# Patient Record
Sex: FEMALE | Race: WHITE | NOT HISPANIC OR LATINO | Employment: OTHER | URBAN - NONMETROPOLITAN AREA
[De-identification: names, ages, dates, MRNs, and addresses within clinical notes are randomized per-mention and may not be internally consistent; named-entity substitution may affect disease eponyms.]

---

## 2017-03-22 ENCOUNTER — HOSPITAL ENCOUNTER (INPATIENT)
Facility: HOSPITAL | Age: 77
LOS: 2 days | Discharge: HOME HEALTH | End: 2017-03-24
Attending: FAMILY MEDICINE | Admitting: FAMILY MEDICINE

## 2017-03-22 ENCOUNTER — APPOINTMENT (OUTPATIENT)
Dept: GENERAL RADIOLOGY | Facility: HOSPITAL | Age: 77
End: 2017-03-22

## 2017-03-22 DIAGNOSIS — K92.2 GASTROINTESTINAL HEMORRHAGE, UNSPECIFIED GASTROINTESTINAL HEMORRHAGE TYPE: Primary | ICD-10-CM

## 2017-03-22 LAB
ABO GROUP BLD: NORMAL
ALBUMIN SERPL-MCNC: 3.6 G/DL (ref 3.4–4.8)
ALBUMIN/GLOB SERPL: 1.5 G/DL (ref 1.1–1.8)
ALP SERPL-CCNC: 53 U/L (ref 38–126)
ALT SERPL W P-5'-P-CCNC: 29 U/L (ref 9–52)
ANION GAP SERPL CALCULATED.3IONS-SCNC: 6 MMOL/L (ref 5–15)
ANION GAP SERPL CALCULATED.3IONS-SCNC: 9 MMOL/L (ref 5–15)
APTT PPP: 24 SECONDS (ref 20–40.3)
AST SERPL-CCNC: 28 U/L (ref 14–36)
BASOPHILS # BLD AUTO: 0.05 10*3/MM3 (ref 0–0.2)
BASOPHILS NFR BLD AUTO: 1.2 % (ref 0–2)
BILIRUB SERPL-MCNC: 0.3 MG/DL (ref 0.2–1.3)
BLD GP AB SCN SERPL QL: NEGATIVE
BUN BLD-MCNC: 19 MG/DL (ref 7–21)
BUN BLD-MCNC: 23 MG/DL (ref 7–21)
BUN/CREAT SERPL: 21.8 (ref 7–25)
BUN/CREAT SERPL: 25 (ref 7–25)
CALCIUM SPEC-SCNC: 8.6 MG/DL (ref 8.4–10.2)
CALCIUM SPEC-SCNC: 8.9 MG/DL (ref 8.4–10.2)
CHLORIDE SERPL-SCNC: 96 MMOL/L (ref 95–110)
CHLORIDE SERPL-SCNC: 98 MMOL/L (ref 95–110)
CO2 SERPL-SCNC: 31 MMOL/L (ref 22–31)
CO2 SERPL-SCNC: 34 MMOL/L (ref 22–31)
CREAT BLD-MCNC: 0.87 MG/DL (ref 0.5–1)
CREAT BLD-MCNC: 0.92 MG/DL (ref 0.5–1)
DEPRECATED RDW RBC AUTO: 46 FL (ref 36.4–46.3)
DEPRECATED RDW RBC AUTO: 46.1 FL (ref 36.4–46.3)
EOSINOPHIL # BLD AUTO: 0.15 10*3/MM3 (ref 0–0.7)
EOSINOPHIL # BLD MANUAL: 0.23 10*3/MM3 (ref 0–0.7)
EOSINOPHIL NFR BLD AUTO: 3.6 % (ref 0–7)
EOSINOPHIL NFR BLD MANUAL: 5 % (ref 0–7)
ERYTHROCYTE [DISTWIDTH] IN BLOOD BY AUTOMATED COUNT: 13.6 % (ref 11.5–14.5)
ERYTHROCYTE [DISTWIDTH] IN BLOOD BY AUTOMATED COUNT: 13.9 % (ref 11.5–14.5)
GFR SERPL CREATININE-BSD FRML MDRD: 59 ML/MIN/1.73 (ref 39–90)
GFR SERPL CREATININE-BSD FRML MDRD: 63 ML/MIN/1.73 (ref 60–90)
GLOBULIN UR ELPH-MCNC: 2.4 GM/DL (ref 2.3–3.5)
GLUCOSE BLD-MCNC: 92 MG/DL (ref 60–100)
GLUCOSE BLD-MCNC: 99 MG/DL (ref 60–100)
HCT VFR BLD AUTO: 19.3 % (ref 35–45)
HCT VFR BLD AUTO: 21.5 % (ref 35–45)
HCT VFR BLD AUTO: 21.5 % (ref 35–45)
HGB BLD-MCNC: 6.3 G/DL (ref 12–15.5)
HGB BLD-MCNC: 7.2 G/DL (ref 12–15.5)
HGB RETIC QN: 25.7 PG (ref 30–38)
HOLD SPECIMEN: NORMAL
HOLD SPECIMEN: NORMAL
IMM GRANULOCYTES # BLD: 0 10*3/MM3 (ref 0–0.02)
IMM GRANULOCYTES NFR BLD: 0 % (ref 0–0.5)
IMM RETICS NFR: 15.5 % (ref 3–15.9)
INR PPP: 1.01 (ref 0.8–1.2)
INR PPP: 1.06 (ref 0.8–1.2)
IRON 24H UR-MRATE: 32 MCG/DL (ref 37–170)
IRON SATN MFR SERPL: 10 % (ref 15–50)
LYMPHOCYTES # BLD AUTO: 1.35 10*3/MM3 (ref 0.6–4.2)
LYMPHOCYTES # BLD MANUAL: 1.82 10*3/MM3 (ref 0.6–4.2)
LYMPHOCYTES NFR BLD AUTO: 32.8 % (ref 10–50)
LYMPHOCYTES NFR BLD MANUAL: 18 % (ref 0–12)
LYMPHOCYTES NFR BLD MANUAL: 40 % (ref 10–50)
Lab: NORMAL
MCH RBC QN AUTO: 29.9 PG (ref 26.5–34)
MCH RBC QN AUTO: 30.5 PG (ref 26.5–34)
MCHC RBC AUTO-ENTMCNC: 32.6 G/DL (ref 31.4–36)
MCHC RBC AUTO-ENTMCNC: 33.5 G/DL (ref 31.4–36)
MCV RBC AUTO: 91.1 FL (ref 80–98)
MCV RBC AUTO: 91.5 FL (ref 80–98)
MONOCYTES # BLD AUTO: 0.82 10*3/MM3 (ref 0–0.9)
MONOCYTES # BLD AUTO: 0.94 10*3/MM3 (ref 0–0.9)
MONOCYTES NFR BLD AUTO: 22.8 % (ref 0–12)
NEUTROPHILS # BLD AUTO: 1.63 10*3/MM3 (ref 2–8.6)
NEUTROPHILS # BLD AUTO: 1.68 10*3/MM3 (ref 2–8.6)
NEUTROPHILS NFR BLD AUTO: 39.6 % (ref 37–80)
NEUTROPHILS NFR BLD MANUAL: 37 % (ref 37–80)
PLAT MORPH BLD: NORMAL
PLATELET # BLD AUTO: 171 10*3/MM3 (ref 150–450)
PLATELET # BLD AUTO: 242 10*3/MM3 (ref 150–450)
PMV BLD AUTO: 10 FL (ref 8–12)
PMV BLD AUTO: 10.8 FL (ref 8–12)
POLYCHROMASIA BLD QL SMEAR: ABNORMAL
POTASSIUM BLD-SCNC: 4.1 MMOL/L (ref 3.5–5.1)
POTASSIUM BLD-SCNC: 4.2 MMOL/L (ref 3.5–5.1)
PROT SERPL-MCNC: 6 G/DL (ref 6.3–8.6)
PROTHROMBIN TIME: 13.3 SECONDS (ref 11.1–15.3)
PROTHROMBIN TIME: 13.8 SECONDS (ref 11.1–15.3)
RBC # BLD AUTO: 2.11 10*6/MM3 (ref 3.77–5.16)
RBC # BLD AUTO: 2.36 10*6/MM3 (ref 3.77–5.16)
RETICS #: 0.07 10*6/MM3 (ref 0.03–0.12)
RETICS/RBC NFR AUTO: 3.07 % (ref 0.63–2.13)
RETICULOCYTE PRODUCTION INDEX: 0.67 % (ref 0.63–2.13)
RH BLD: POSITIVE
SODIUM BLD-SCNC: 136 MMOL/L (ref 137–145)
SODIUM BLD-SCNC: 138 MMOL/L (ref 137–145)
TIBC SERPL-MCNC: 319 MCG/DL (ref 265–497)
WBC MORPH BLD: NORMAL
WBC NRBC COR # BLD: 4.12 10*3/MM3 (ref 3.2–9.8)
WBC NRBC COR # BLD: 4.54 10*3/MM3 (ref 3.2–9.8)
WHOLE BLOOD HOLD SPECIMEN: NORMAL
WHOLE BLOOD HOLD SPECIMEN: NORMAL

## 2017-03-22 PROCEDURE — 93010 ELECTROCARDIOGRAM REPORT: CPT | Performed by: INTERNAL MEDICINE

## 2017-03-22 PROCEDURE — 83540 ASSAY OF IRON: CPT | Performed by: HOSPITALIST

## 2017-03-22 PROCEDURE — P9016 RBC LEUKOCYTES REDUCED: HCPCS

## 2017-03-22 PROCEDURE — 85025 COMPLETE CBC W/AUTO DIFF WBC: CPT | Performed by: HOSPITALIST

## 2017-03-22 PROCEDURE — 85046 RETICYTE/HGB CONCENTRATE: CPT | Performed by: HOSPITALIST

## 2017-03-22 PROCEDURE — 86900 BLOOD TYPING SEROLOGIC ABO: CPT | Performed by: FAMILY MEDICINE

## 2017-03-22 PROCEDURE — 71020 HC CHEST PA AND LATERAL: CPT

## 2017-03-22 PROCEDURE — 25010000002 FUROSEMIDE PER 20 MG: Performed by: HOSPITALIST

## 2017-03-22 PROCEDURE — 85025 COMPLETE CBC W/AUTO DIFF WBC: CPT | Performed by: FAMILY MEDICINE

## 2017-03-22 PROCEDURE — 86923 COMPATIBILITY TEST ELECTRIC: CPT

## 2017-03-22 PROCEDURE — 36430 TRANSFUSION BLD/BLD COMPNT: CPT

## 2017-03-22 PROCEDURE — 86901 BLOOD TYPING SEROLOGIC RH(D): CPT | Performed by: FAMILY MEDICINE

## 2017-03-22 PROCEDURE — 99285 EMERGENCY DEPT VISIT HI MDM: CPT

## 2017-03-22 PROCEDURE — 80053 COMPREHEN METABOLIC PANEL: CPT | Performed by: FAMILY MEDICINE

## 2017-03-22 PROCEDURE — 85007 BL SMEAR W/DIFF WBC COUNT: CPT | Performed by: FAMILY MEDICINE

## 2017-03-22 PROCEDURE — 83550 IRON BINDING TEST: CPT | Performed by: HOSPITALIST

## 2017-03-22 PROCEDURE — 86850 RBC ANTIBODY SCREEN: CPT | Performed by: FAMILY MEDICINE

## 2017-03-22 PROCEDURE — 93005 ELECTROCARDIOGRAM TRACING: CPT | Performed by: NURSE PRACTITIONER

## 2017-03-22 PROCEDURE — 85610 PROTHROMBIN TIME: CPT | Performed by: FAMILY MEDICINE

## 2017-03-22 PROCEDURE — 85610 PROTHROMBIN TIME: CPT | Performed by: HOSPITALIST

## 2017-03-22 PROCEDURE — 80048 BASIC METABOLIC PNL TOTAL CA: CPT | Performed by: HOSPITALIST

## 2017-03-22 PROCEDURE — 85730 THROMBOPLASTIN TIME PARTIAL: CPT | Performed by: HOSPITALIST

## 2017-03-22 PROCEDURE — 86900 BLOOD TYPING SEROLOGIC ABO: CPT

## 2017-03-22 RX ORDER — SODIUM CHLORIDE 0.9 % (FLUSH) 0.9 %
1-10 SYRINGE (ML) INJECTION AS NEEDED
Status: DISCONTINUED | OUTPATIENT
Start: 2017-03-22 | End: 2017-03-24 | Stop reason: HOSPADM

## 2017-03-22 RX ORDER — PANTOPRAZOLE SODIUM 40 MG/1
40 TABLET, DELAYED RELEASE ORAL DAILY
COMMUNITY
Start: 2016-10-10 | End: 2018-06-21 | Stop reason: HOSPADM

## 2017-03-22 RX ORDER — LISINOPRIL 10 MG/1
10 TABLET ORAL DAILY
COMMUNITY
Start: 2016-03-31 | End: 2019-01-03

## 2017-03-22 RX ORDER — ROPINIROLE 1 MG/1
1 TABLET, FILM COATED ORAL NIGHTLY
Status: DISCONTINUED | OUTPATIENT
Start: 2017-03-22 | End: 2017-03-24 | Stop reason: HOSPADM

## 2017-03-22 RX ORDER — PANTOPRAZOLE SODIUM 40 MG/1
40 TABLET, DELAYED RELEASE ORAL DAILY
Status: DISCONTINUED | OUTPATIENT
Start: 2017-03-22 | End: 2017-03-24 | Stop reason: HOSPADM

## 2017-03-22 RX ORDER — FUROSEMIDE 20 MG/1
20 TABLET ORAL 3 TIMES WEEKLY
Status: DISCONTINUED | OUTPATIENT
Start: 2017-03-22 | End: 2017-03-24 | Stop reason: HOSPADM

## 2017-03-22 RX ORDER — SOTALOL HYDROCHLORIDE 80 MG/1
80 TABLET ORAL 2 TIMES DAILY
Status: DISCONTINUED | OUTPATIENT
Start: 2017-03-22 | End: 2017-03-24 | Stop reason: HOSPADM

## 2017-03-22 RX ORDER — SODIUM CHLORIDE 0.9 % (FLUSH) 0.9 %
10 SYRINGE (ML) INJECTION AS NEEDED
Status: DISCONTINUED | OUTPATIENT
Start: 2017-03-22 | End: 2017-03-24 | Stop reason: HOSPADM

## 2017-03-22 RX ORDER — HYDROCODONE BITARTRATE AND ACETAMINOPHEN 10; 325 MG/1; MG/1
1 TABLET ORAL 2 TIMES DAILY
Status: DISCONTINUED | OUTPATIENT
Start: 2017-03-22 | End: 2017-03-24 | Stop reason: HOSPADM

## 2017-03-22 RX ORDER — FAMOTIDINE 40 MG/1
40 TABLET, FILM COATED ORAL DAILY
Status: DISCONTINUED | OUTPATIENT
Start: 2017-03-22 | End: 2017-03-24 | Stop reason: HOSPADM

## 2017-03-22 RX ORDER — ROPINIROLE 0.5 MG/1
0.5 TABLET, FILM COATED ORAL EVERY MORNING
Status: DISCONTINUED | OUTPATIENT
Start: 2017-03-23 | End: 2017-03-24 | Stop reason: HOSPADM

## 2017-03-22 RX ORDER — FAMOTIDINE 40 MG/1
40 TABLET, FILM COATED ORAL DAILY
COMMUNITY
Start: 2017-02-25 | End: 2018-06-21 | Stop reason: HOSPADM

## 2017-03-22 RX ORDER — HYDROCODONE BITARTRATE AND ACETAMINOPHEN 10; 325 MG/1; MG/1
1 TABLET ORAL 2 TIMES DAILY
COMMUNITY
Start: 2017-02-07 | End: 2017-05-02 | Stop reason: DRUGHIGH

## 2017-03-22 RX ORDER — SOTALOL HYDROCHLORIDE 80 MG/1
80 TABLET ORAL 2 TIMES DAILY
COMMUNITY
Start: 2016-11-23

## 2017-03-22 RX ORDER — GABAPENTIN 100 MG/1
100 CAPSULE ORAL 3 TIMES DAILY
Status: DISCONTINUED | OUTPATIENT
Start: 2017-03-22 | End: 2017-03-24 | Stop reason: HOSPADM

## 2017-03-22 RX ORDER — TRAZODONE HYDROCHLORIDE 50 MG/1
50 TABLET ORAL NIGHTLY
Status: DISCONTINUED | OUTPATIENT
Start: 2017-03-22 | End: 2017-03-24 | Stop reason: HOSPADM

## 2017-03-22 RX ORDER — ATORVASTATIN CALCIUM 40 MG/1
40 TABLET, FILM COATED ORAL DAILY
Status: DISCONTINUED | OUTPATIENT
Start: 2017-03-22 | End: 2017-03-24 | Stop reason: HOSPADM

## 2017-03-22 RX ORDER — GABAPENTIN 100 MG/1
100 CAPSULE ORAL 3 TIMES DAILY
COMMUNITY
Start: 2016-10-10

## 2017-03-22 RX ORDER — FUROSEMIDE 10 MG/ML
20 INJECTION INTRAMUSCULAR; INTRAVENOUS ONCE
Status: COMPLETED | OUTPATIENT
Start: 2017-03-22 | End: 2017-03-22

## 2017-03-22 RX ORDER — ROPINIROLE 0.5 MG/1
0.5 TABLET, FILM COATED ORAL
COMMUNITY
End: 2017-05-02 | Stop reason: DRUGHIGH

## 2017-03-22 RX ORDER — ROPINIROLE 0.5 MG/1
0.5 TABLET, FILM COATED ORAL EVERY MORNING
COMMUNITY
Start: 2016-07-14 | End: 2017-04-05

## 2017-03-22 RX ORDER — LISINOPRIL 10 MG/1
10 TABLET ORAL DAILY
Status: DISCONTINUED | OUTPATIENT
Start: 2017-03-22 | End: 2017-03-24 | Stop reason: HOSPADM

## 2017-03-22 RX ORDER — TRAZODONE HYDROCHLORIDE 50 MG/1
50 TABLET ORAL NIGHTLY
COMMUNITY
Start: 2016-10-10

## 2017-03-22 RX ORDER — FUROSEMIDE 20 MG/1
20 TABLET ORAL 3 TIMES WEEKLY
COMMUNITY
Start: 2016-06-23 | End: 2017-05-08 | Stop reason: SDUPTHER

## 2017-03-22 RX ORDER — ATORVASTATIN CALCIUM 40 MG/1
40 TABLET, FILM COATED ORAL
COMMUNITY
Start: 2017-02-25 | End: 2017-04-05

## 2017-03-22 RX ADMIN — FAMOTIDINE 40 MG: 40 TABLET ORAL at 22:01

## 2017-03-22 RX ADMIN — PANTOPRAZOLE SODIUM 40 MG: 40 TABLET, DELAYED RELEASE ORAL at 22:01

## 2017-03-22 RX ADMIN — ATORVASTATIN CALCIUM 40 MG: 40 TABLET, FILM COATED ORAL at 22:45

## 2017-03-22 RX ADMIN — TRAZODONE HYDROCHLORIDE 50 MG: 50 TABLET ORAL at 22:02

## 2017-03-22 RX ADMIN — FUROSEMIDE 20 MG: 10 INJECTION, SOLUTION INTRAVENOUS at 22:02

## 2017-03-22 RX ADMIN — ROPINIROLE 1 MG: 1 TABLET ORAL at 22:00

## 2017-03-22 RX ADMIN — GABAPENTIN 100 MG: 100 CAPSULE ORAL at 22:00

## 2017-03-22 NOTE — H&P
[facility] Medicine Admission      Date of Admission: 3/22/2017      Primary Care Physician: Charles Everett MD      Chief Complaint: Weakness, dizziness, anemia    HPI:  The patient is a 76-year-old female who presented with complaints of weakness and dizziness.  Patient has expressive aphasia from her recent stroke so the majority of her history was obtained from her daughter.  The patient's daughter states that she noticed weakness and fatigue worsening on approximately the Thursday prior to admission.  On the day of admission he had a post stroke follow-up with the neurologist in [place].  She was found to have a blood pressure in the 80s systolic, and was dizzy with standing.  The neurologist's office did blood work and then discharged her to go to the [facility] urgent care.  While they were on their way to the urgent care, the office called and stated that the patient's hemoglobin was 7.5 and needed to go to the emergency department.  Neither the patient nor her daughter has noticed any melena or hematochezia.  She was started on Coumadin after her stroke on February 22.  She was transitioned as a result oh on March 9.  Of note she has been feeling more short of breath, and has been using her oxygen more lately     Concurrent Medical History:  has a past medical history of A-fib; Anemia; COPD (chronic obstructive pulmonary disease); Emphysema of lung; Hypertension; and Stroke.    Past Surgical History:  has a past surgical history that includes Cholecystectomy; Hysterectomy; Appendectomy; and Brain surgery.    Family History: family history is not on file.    Social History:  reports that she has been smoking Cigarettes.  She has a 60.00 pack-year smoking history. She does not have any smokeless tobacco history on file. She reports that she does not drink alcohol or use illicit drugs.    Allergies:   Allergies   Allergen Reactions   • Penicillins         Medications: Scheduled Meds:   Continuous Infusions:   PRN Meds:.•  sodium chloride    Review of Systems:  Review of Systems   Reason unable to perform ROS: expressive aphasia.      Physical Exam:   Temp:  [97.7 °F (36.5 °C)-98.8 °F (37.1 °C)] 97.8 °F (36.6 °C)  Heart Rate:  [59-70] 62  Resp:  [15-18] 16  BP: ()/(45-65) 112/53     Physical Exam   Constitutional: She is oriented to person, place, and time. She appears well-developed and well-nourished.   HENT:   Head: Normocephalic and atraumatic.   Nose: Nose normal.   Mouth/Throat: Oropharynx is clear and moist.   Eyes: Conjunctivae, EOM and lids are normal. Pupils are equal, round, and reactive to light. No scleral icterus.   Neck: Normal range of motion. Neck supple. No JVD present. No tracheal tenderness and no spinous process tenderness present. No rigidity. No tracheal deviation, no edema and normal range of motion present.   Cardiovascular: Normal rate, regular rhythm, S1 normal, S2 normal, normal heart sounds and normal pulses.  Exam reveals no gallop and no friction rub.    No murmur heard.  Pulmonary/Chest: Effort normal and breath sounds normal. No accessory muscle usage. No respiratory distress. She has no decreased breath sounds. She has no wheezes. She has no rales. She exhibits no tenderness.   Abdominal: Soft. Bowel sounds are normal. She exhibits no distension and no mass. There is no tenderness. There is no rebound and no guarding.   Musculoskeletal: She exhibits no edema or tenderness.        Right shoulder: She exhibits normal range of motion, no tenderness and no pain.   Neurological: She is alert and oriented to person, place, and time. She has normal reflexes. She displays no atrophy and normal reflexes. No cranial nerve deficit or sensory deficit. She exhibits normal muscle tone. She displays no seizure activity. Coordination normal.   Expressive aphasia   Skin: Skin is warm. No rash noted. No pallor.   Psychiatric: She has a  normal mood and affect. Her behavior is normal. Judgment and thought content normal.         Results Reviewed:  I have personally reviewed current lab, radiology, and data and agree with results.  Lab Results (last 24 hours)     Procedure Component Value Units Date/Time    Comprehensive Metabolic Panel [[id number]]  (Abnormal) Collected:  03/22/17 1326    Specimen:  Blood Updated:  03/22/17 1354     Glucose 99 mg/dL      BUN 23 (H) mg/dL      Creatinine 0.92 mg/dL      Sodium 136 (L) mmol/L      Potassium 4.1 mmol/L      Chloride 96 mmol/L      CO2 31.0 mmol/L      Calcium 8.9 mg/dL      Total Protein 6.0 (L) g/dL      Albumin 3.60 g/dL      ALT (SGPT) 29 U/L      AST (SGOT) 28 U/L      Alkaline Phosphatase 53 U/L      Total Bilirubin 0.3 mg/dL      eGFR Non African Amer 59 mL/min/1.73      Globulin 2.4 gm/dL      A/G Ratio 1.5 g/dL      BUN/Creatinine Ratio 25.0     Anion Gap 9.0 mmol/L     Narrative:       The MDRD GFR formula is only valid for adults with stable renal function between ages 18 and 70.    CBC Auto Differential [[id number]]  (Abnormal) Collected:  03/22/17 1326    Specimen:  Blood Updated:  03/22/17 1403     WBC 4.54 10*3/mm3      RBC 2.11 (L) 10*6/mm3      Hemoglobin 6.3 (C) g/dL      Hematocrit 19.3 (L) %      MCV 91.5 fL      MCH 29.9 pg      MCHC 32.6 g/dL      RDW 13.6 %      RDW-SD 46.0 fl      MPV 10.8 fL      Platelets 242 10*3/mm3     Protime-INR [[id number]]  (Normal) Collected:  03/22/17 1326    Specimen:  Blood Updated:  03/22/17 1448     Protime 13.3 Seconds      INR 1.01    Narrative:       Therapeutic range for most indications is 2.0-3.0 INR,  or 2.5-3.5 for mechanical heart valves.    CBC & Differential [[id number]] Collected:  03/22/17 1326    Specimen:  Blood Updated:  03/22/17 1513    Narrative:       The following orders were created for panel order CBC & Differential.  Procedure                               Abnormality         Status                     ---------                                -----------         ------                     Manual Differential[[id number]]           Abnormal            Final result               CBC Auto Differential[[id number]]         Abnormal            Final result                 Please view results for these tests on the individual orders.    Manual Differential [[id number]]  (Abnormal) Collected:  03/22/17 1326    Specimen:  Blood Updated:  03/22/17 1513     Neutrophil % 37.0 %      Lymphocyte % 40.0 %      Monocyte % 18.0 (H) %      Eosinophil % 5.0 %      Neutrophils Absolute 1.68 (L) 10*3/mm3      Lymphocytes Absolute 1.82 10*3/mm3      Monocytes Absolute 0.82 10*3/mm3      Eosinophils Absolute 0.23 10*3/mm3      Polychromasia Slight/1+     WBC Morphology Normal     Platelet Morphology Normal    George Draw [[id number]] Collected:  03/22/17 1326    Specimen:  Blood Updated:  03/22/17 1801    Narrative:       The following orders were created for panel order George Draw.  Procedure                               Abnormality         Status                     ---------                               -----------         ------                     Light Blue Top[[id number]]                                    Final result               Green Top (Gel)[[id number]]                                   Final result               Lavender Top[[id number]]                                      Final result               Gold Top - SST[[id number]]                                    Final result                 Please view results for these tests on the individual orders.    Light Blue Top [[id number]] Collected:  03/22/17 1326    Specimen:  Blood Updated:  03/22/17 1801     Extra Tube hold for add-on      Auto resulted       Green Top (Gel) [[id number]] Collected:  03/22/17 1326    Specimen:  Blood Updated:  03/22/17 1801     Extra Tube Hold for add-ons.      Auto resulted.       Lavender Top [[id number]] Collected:  03/22/17 1326    Specimen:  Blood Updated:  03/22/17 1801     Extra Tube hold  for add-on      Auto resulted       Gold Top - SST [[id number]] Collected:  03/22/17 1326    Specimen:  Blood Updated:  03/22/17 1801     Extra Tube Hold for add-ons.      Auto resulted.           Imaging Results (last 24 hours)     Procedure Component Value Units Date/Time    XR Chest 2 View [[id number]] Collected:  03/22/17 1452     Updated:  03/22/17 1455    Narrative:       Patient Name:  SANNA ZHANG  Patient ID:  [id number]   Ordering:  ERICKA LUJAN  Attending:  IRENE POLK  Referring:  ERICKA LUJAN  ------------------------------------------------  Chest PA and lateral  CLINICAL INDICATION: Shortness of breath. Anemia.    COMPARISON: October 17, 2011.    FINDINGS: Cardiac silhouette is normal in size. Pulmonary  vascularity is unremarkable.     No focal infiltrate or consolidation.  No pleural effusion.  No  pneumothorax.  Subtle fibrotic changes in both apices.    Fluid or thickening of the major fissures seen on lateral view  posteriorly.  The lungs are otherwise clear.      Impression:       CONCLUSION: Subtle bilateral apical fibrotic changes. Fluid  and/or thickening of the major fissures seen on lateral view.  Otherwise unremarkable chest.    Electronically signed by:  Jerrod Palacios MD  3/22/2017 2:54 PM CDT  Workstation: [id number]            Assessment:    Hospital Problem List     GI bleed                Plan:  1.  Symptomatic Anemia:  Patient with a hemoglobin of 6.3.  Complains of weakness, fatigue, dizziness that is worse with standing, and hypotension.   Hemoglobin on March 7 was 12.9.  Patient was started on anticoagulation as a result of a stroke February 22.  Patient is currently getting the first of 2 units of packed red blood cells.  We'll recheck hemoglobin after that.  Dr. Dickson was contacted from the emergency department and has graciously agreed to see the patient in consultation.  Will follow serial hemoglobin.  2.  Recent cerebrovascular accident:  On February 22, patient  has been left with expressive aphasia.  Was felt to be secondary to an embolic event from her atrial fibrillation.  3.  Atrial fibrillation: Has been on anticoagulation, and sotalol.  4.  COPD: No evidence of exacerbation.  5.  Tobacco abuse: Patient is currently not amenable to cessation counseling.  6.  Hypertension  7.  Hyperlipidemia  8.  DVT prophylaxis: Will use SCDs and GEN hose.  Pharmacologic anticoagulation not advised to bleeding.  9.  GI prophylaxis: Protonix.      I discussed the patients findings and my recommendations with: Patient and family.    Ramírez Dunaway MD  03/22/17  6:56 PM

## 2017-03-22 NOTE — ED PROVIDER NOTES
Subjective   HPI Comments: Daughter relates she had taken her to neurology appointment this morning, and due to low hgb, was told to come in to be checked. Also has noticed she has been feeling weak past few days, dizzy upon standing, and pale. Had stroke about a month ago. She had blood transfusion several years ago.      History provided by:  Caregiver      Review of Systems   Constitutional: Positive for fatigue.   Eyes: Negative.    Respiratory: Negative.    Cardiovascular: Negative.    Gastrointestinal: Negative.    Genitourinary: Negative.    Musculoskeletal: Negative.    Skin: Positive for pallor.   Neurological: Positive for speech difficulty and weakness.   Hematological: Bruises/bleeds easily.   Psychiatric/Behavioral: Negative.        Past Medical History:   Diagnosis Date   • A-fib    • Anemia    • COPD (chronic obstructive pulmonary disease)    • Emphysema of lung    • Hypertension    • Stroke        Allergies   Allergen Reactions   • Iodides Rash   • Penicillins Rash       Past Surgical History:   Procedure Laterality Date   • APPENDECTOMY     • BRAIN SURGERY      anuyrism   • CHOLECYSTECTOMY     • HYSTERECTOMY         History reviewed. No pertinent family history.    Social History     Social History   • Marital status:      Spouse name: N/A   • Number of children: N/A   • Years of education: N/A     Social History Main Topics   • Smoking status: Current Every Day Smoker     Packs/day: 1.00     Years: 60.00     Types: Cigarettes   • Smokeless tobacco: None   • Alcohol use No   • Drug use: No   • Sexual activity: No     Other Topics Concern   • None     Social History Narrative   • None           Objective   Physical Exam   Constitutional: She is oriented to person, place, and time. She appears well-developed and well-nourished.   HENT:   Head: Normocephalic.   Eyes: EOM are normal. Pupils are equal, round, and reactive to light.   Neck: Normal range of motion. Neck supple.   Cardiovascular:  Normal rate.    Pulmonary/Chest: Effort normal and breath sounds normal.   Abdominal: Soft. Bowel sounds are normal. She exhibits no distension. There is no tenderness.   Bruising noted over suprapubic area 2 cm x 3 cm   Genitourinary: Rectal exam shows guaiac positive stool.   Genitourinary Comments: Rectal exam, mild erythema noted in nikky-area, guaiac positive   Musculoskeletal: Normal range of motion.   Neurological: She is alert and oriented to person, place, and time. No cranial nerve deficit. Coordination normal.   Speech is clear, but words not appropriate, aphasia noted.   Skin: Skin is warm and dry. There is pallor.   Bruising noted on hands and forearms.   Nursing note and vitals reviewed.      ECG 12 Lead    Date/Time: 3/22/2017 3:03 PM  Performed by: ERICKA LUJAN  Authorized by: ERICKA LUJAN   Interpreted by physician  Comparison: compared with previous ECG from 10/19/2011  Comparison to previous ECG: A-fib on last EKG, NSR today  Rhythm: sinus rhythm  BPM: 62  Clinical impression: non-specific ECG               ED Course  ED Course      XR Chest 2 View   Final Result   CONCLUSION: Subtle bilateral apical fibrotic changes. Fluid   and/or thickening of the major fissures seen on lateral view.   Otherwise unremarkable chest.      Electronically signed by:  Jerrod Palacios MD  3/22/2017 2:54 PM CDT   Workstation: [id number]          Results for orders placed or performed during the hospital encounter of 03/22/17   Comprehensive Metabolic Panel   Result Value Ref Range    Glucose 99 60 - 100 mg/dL    BUN 23 (H) 7 - 21 mg/dL    Creatinine 0.92 0.50 - 1.00 mg/dL    Sodium 136 (L) 137 - 145 mmol/L    Potassium 4.1 3.5 - 5.1 mmol/L    Chloride 96 95 - 110 mmol/L    CO2 31.0 22.0 - 31.0 mmol/L    Calcium 8.9 8.4 - 10.2 mg/dL    Total Protein 6.0 (L) 6.3 - 8.6 g/dL    Albumin 3.60 3.40 - 4.80 g/dL    ALT (SGPT) 29 9 - 52 U/L    AST (SGOT) 28 14 - 36 U/L    Alkaline Phosphatase 53 38 - 126 U/L    Total Bilirubin  0.3 0.2 - 1.3 mg/dL    eGFR Non African Amer 59 39 - 90 mL/min/1.73    Globulin 2.4 2.3 - 3.5 gm/dL    A/G Ratio 1.5 1.1 - 1.8 g/dL    BUN/Creatinine Ratio 25.0 7.0 - 25.0    Anion Gap 9.0 5.0 - 15.0 mmol/L   Protime-INR   Result Value Ref Range    Protime 13.3 11.1 - 15.3 Seconds    INR 1.01 0.80 - 1.20   CBC Auto Differential   Result Value Ref Range    WBC 4.54 3.20 - 9.80 10*3/mm3    RBC 2.11 (L) 3.77 - 5.16 10*6/mm3    Hemoglobin 6.3 (C) 12.0 - 15.5 g/dL    Hematocrit 19.3 (L) 35.0 - 45.0 %    MCV 91.5 80.0 - 98.0 fL    MCH 29.9 26.5 - 34.0 pg    MCHC 32.6 31.4 - 36.0 g/dL    RDW 13.6 11.5 - 14.5 %    RDW-SD 46.0 36.4 - 46.3 fl    MPV 10.8 8.0 - 12.0 fL    Platelets 242 150 - 450 10*3/mm3   Basic Metabolic Panel   Result Value Ref Range    Glucose 92 60 - 100 mg/dL    BUN 19 7 - 21 mg/dL    Creatinine 0.87 0.50 - 1.00 mg/dL    Sodium 138 137 - 145 mmol/L    Potassium 4.2 3.5 - 5.1 mmol/L    Chloride 98 95 - 110 mmol/L    CO2 34.0 (H) 22.0 - 31.0 mmol/L    Calcium 8.6 8.4 - 10.2 mg/dL    eGFR Non African Amer 63 >60 mL/min/1.73    BUN/Creatinine Ratio 21.8 7.0 - 25.0    Anion Gap 6.0 5.0 - 15.0 mmol/L   Reticulocytes   Result Value Ref Range    Reticulocyte % 3.07 (H) 0.63 - 2.13 %    Reticulocyte Absolute 0.0725 0.0250 - 0.1250 10*6/mm3    Immature Reticulocyte Fraction 15.5 3.0 - 15.9 %    Reticulocyte Production Index 0.67 0.63 - 2.13 %    Hematocrit 21.5 (L) 35.0 - 45.0 %    Reticulated Hgb 25.7 (L) 30.0 - 38.0 pg   Iron Profile   Result Value Ref Range    Iron 32 (L) 37 - 170 mcg/dL    TIBC 319 265 - 497 mcg/dL    Iron Saturation 10 (L) 15 - 50 %   aPTT   Result Value Ref Range    PTT 24.0 20.0 - 40.3 seconds   Protime-INR   Result Value Ref Range    Protime 13.8 11.1 - 15.3 Seconds    INR 1.06 0.80 - 1.20   CBC Auto Differential   Result Value Ref Range    WBC 4.12 3.20 - 9.80 10*3/mm3    RBC 2.36 (L) 3.77 - 5.16 10*6/mm3    Hemoglobin 7.2 (L) 12.0 - 15.5 g/dL    Hematocrit 21.5 (L) 35.0 - 45.0 %    MCV  91.1 80.0 - 98.0 fL    MCH 30.5 26.5 - 34.0 pg    MCHC 33.5 31.4 - 36.0 g/dL    RDW 13.9 11.5 - 14.5 %    RDW-SD 46.1 36.4 - 46.3 fl    MPV 10.0 8.0 - 12.0 fL    Platelets 171 150 - 450 10*3/mm3    Neutrophil % 39.6 37.0 - 80.0 %    Lymphocyte % 32.8 10.0 - 50.0 %    Monocyte % 22.8 (H) 0.0 - 12.0 %    Eosinophil % 3.6 0.0 - 7.0 %    Basophil % 1.2 0.0 - 2.0 %    Immature Grans % 0.0 0.0 - 0.5 %    Neutrophils, Absolute 1.63 (L) 2.00 - 8.60 10*3/mm3    Lymphocytes, Absolute 1.35 0.60 - 4.20 10*3/mm3    Monocytes, Absolute 0.94 (H) 0.00 - 0.90 10*3/mm3    Eosinophils, Absolute 0.15 0.00 - 0.70 10*3/mm3    Basophils, Absolute 0.05 0.00 - 0.20 10*3/mm3    Immature Grans, Absolute 0.00 0.00 - 0.02 10*3/mm3   Basic Metabolic Panel   Result Value Ref Range    Glucose 91 60 - 100 mg/dL    BUN 17 7 - 21 mg/dL    Creatinine 0.88 0.50 - 1.00 mg/dL    Sodium 140 137 - 145 mmol/L    Potassium 3.8 3.5 - 5.1 mmol/L    Chloride 98 95 - 110 mmol/L    CO2 34.0 (H) 22.0 - 31.0 mmol/L    Calcium 8.4 8.4 - 10.2 mg/dL    eGFR Non African Amer 62 >60 mL/min/1.73    BUN/Creatinine Ratio 19.3 7.0 - 25.0    Anion Gap 8.0 5.0 - 15.0 mmol/L   CBC Auto Differential   Result Value Ref Range    WBC 3.23 3.20 - 9.80 10*3/mm3    RBC 2.48 (L) 3.77 - 5.16 10*6/mm3    Hemoglobin 7.6 (L) 12.0 - 15.5 g/dL    Hematocrit 22.4 (L) 35.0 - 45.0 %    MCV 90.3 80.0 - 98.0 fL    MCH 30.6 26.5 - 34.0 pg    MCHC 33.9 31.4 - 36.0 g/dL    RDW 13.5 11.5 - 14.5 %    RDW-SD 44.5 36.4 - 46.3 fl    MPV 10.6 8.0 - 12.0 fL    Platelets 194 150 - 450 10*3/mm3    Neutrophil % 40.5 37.0 - 80.0 %    Lymphocyte % 30.3 10.0 - 50.0 %    Monocyte % 24.5 (H) 0.0 - 12.0 %    Eosinophil % 2.5 0.0 - 7.0 %    Basophil % 1.9 0.0 - 2.0 %    Immature Grans % 0.3 0.0 - 0.5 %    Neutrophils, Absolute 1.31 (L) 2.00 - 8.60 10*3/mm3    Lymphocytes, Absolute 0.98 0.60 - 4.20 10*3/mm3    Monocytes, Absolute 0.79 0.00 - 0.90 10*3/mm3    Eosinophils, Absolute 0.08 0.00 - 0.70 10*3/mm3     Basophils, Absolute 0.06 0.00 - 0.20 10*3/mm3    Immature Grans, Absolute 0.01 0.00 - 0.02 10*3/mm3   Type & Screen   Result Value Ref Range    ABO Type A     RH type Positive     Antibody Screen Negative    PREVIOUS HISTORY   Result Value Ref Range    Previous History Previous Record on File    Prepare RBC, 1 Units   Result Value Ref Range    Product Code Y2362M07     Unit Number [id number]     UNIT  ABO A     UNIT  RH POS     Dispense Status PT     Blood Type APOS     Blood Expiration Date 201704132359     Blood Type Barcode 6200    Prepare RBC, 2 Units   Result Value Ref Range    Product Code M3761Y82     Unit Number [id number]     UNIT  ABO A     UNIT  RH POS     Dispense Status IS     Blood Type APOS     Blood Expiration Date 201704122359     Blood Type Barcode 6200     Product Code S1901O54     Unit Number [id number]     UNIT  ABO A     UNIT  RH POS     Dispense Status IS     Blood Type APOS     Blood Expiration Date 201704122359     Blood Type Barcode 6200    Light Blue Top   Result Value Ref Range    Extra Tube hold for add-on    Green Top (Gel)   Result Value Ref Range    Extra Tube Hold for add-ons.    Lavender Top   Result Value Ref Range    Extra Tube hold for add-on    Gold Top - SST   Result Value Ref Range    Extra Tube Hold for add-ons.    Manual Differential   Result Value Ref Range    Neutrophil % 37.0 37.0 - 80.0 %    Lymphocyte % 40.0 10.0 - 50.0 %    Monocyte % 18.0 (H) 0.0 - 12.0 %    Eosinophil % 5.0 0.0 - 7.0 %    Neutrophils Absolute 1.68 (L) 2.00 - 8.60 10*3/mm3    Lymphocytes Absolute 1.82 0.60 - 4.20 10*3/mm3    Monocytes Absolute 0.82 0.00 - 0.90 10*3/mm3    Eosinophils Absolute 0.23 0.00 - 0.70 10*3/mm3    Polychromasia Slight/1+ None Seen    WBC Morphology Normal Normal    Platelet Morphology Normal Normal   Manual Differential   Result Value Ref Range    Neutrophil % 44.0 37.0 - 80.0 %    Lymphocyte % 27.0 10.0 - 50.0 %    Monocyte % 18.0 (H) 0.0 - 12.0 %    Eosinophil  % 3.0 0.0 - 7.0 %    Basophil % 2.0 0.0 - 2.0 %    Bands %  2.0 0.0 - 5.0 %    Atypical Lymphocyte % 4.0 0.0 - 5.0 %    Neutrophils Absolute 1.49 (L) 2.00 - 8.60 10*3/mm3    Lymphocytes Absolute 0.87 0.60 - 4.20 10*3/mm3    Monocytes Absolute 0.58 0.00 - 0.90 10*3/mm3    Eosinophils Absolute 0.10 0.00 - 0.70 10*3/mm3    Basophils Absolute 0.06 0.00 - 0.20 10*3/mm3    Anisocytosis Slight/1+ None Seen    Hypochromia Slight/1+ None Seen    Microcytes Slight/1+ None Seen    WBC Morphology Normal Normal    Platelet Estimate Adequate Normal               MDM  Number of Diagnoses or Management Options  Gastrointestinal hemorrhage, unspecified gastrointestinal hemorrhage type:   Diagnosis management comments: Daughter present with pt , Keli Hilario, has guardianship. Pt had been on coumadin for A-fib until about a month ago, when she had a CVA and she was taken off the coumadin and started on Eliquis. CVA left her with memory and aphasia issues. Full mobility all limbs. But at recheck today with neurologist in [place], labs checked and she was on the way home and she was called and told to come in as her hgb was 7. Daughter related she does bruise easily, and she is more pale than usual. States she did have colonoscopy 3 years ago per Dr Dickson. She was Guaiac +. D/W Dr Motley, then called consulted with Dr Dickson and he will see her in hospital as consult, D/W hospitalist and she will be admitted for GI bleed. Hbg 6.3.  BP initially 88/45, then 102/53. One unit PRBC transfused while in ED.      Final diagnoses:   Gastrointestinal hemorrhage, unspecified gastrointestinal hemorrhage type            Tita Alex, APRN  03/23/17 1678

## 2017-03-23 PROBLEM — I10 HYPERTENSION: Chronic | Status: ACTIVE | Noted: 2017-03-23

## 2017-03-23 PROBLEM — D64.9 ANEMIA: Chronic | Status: ACTIVE | Noted: 2017-03-23

## 2017-03-23 PROBLEM — Z86.73 HISTORY OF STROKE: Chronic | Status: ACTIVE | Noted: 2017-03-23

## 2017-03-23 PROBLEM — R47.01 EXPRESSIVE APHASIA: Chronic | Status: ACTIVE | Noted: 2017-03-23

## 2017-03-23 PROBLEM — J44.9 COPD (CHRONIC OBSTRUCTIVE PULMONARY DISEASE) (HCC): Chronic | Status: ACTIVE | Noted: 2017-03-23

## 2017-03-23 PROBLEM — I48.91 A-FIB (HCC): Chronic | Status: ACTIVE | Noted: 2017-03-23

## 2017-03-23 LAB
ABO + RH BLD: NORMAL
ANION GAP SERPL CALCULATED.3IONS-SCNC: 8 MMOL/L (ref 5–15)
ANISOCYTOSIS BLD QL: ABNORMAL
BASOPHILS # BLD AUTO: 0.06 10*3/MM3 (ref 0–0.2)
BASOPHILS # BLD MANUAL: 0.06 10*3/MM3 (ref 0–0.2)
BASOPHILS NFR BLD AUTO: 1.9 % (ref 0–2)
BASOPHILS NFR BLD AUTO: 2 % (ref 0–2)
BH BB BLOOD EXPIRATION DATE: NORMAL
BH BB BLOOD TYPE BARCODE: 6200
BH BB DISPENSE STATUS: NORMAL
BH BB PRODUCT CODE: NORMAL
BH BB UNIT NUMBER: NORMAL
BUN BLD-MCNC: 17 MG/DL (ref 7–21)
BUN/CREAT SERPL: 19.3 (ref 7–25)
CALCIUM SPEC-SCNC: 8.4 MG/DL (ref 8.4–10.2)
CHLORIDE SERPL-SCNC: 98 MMOL/L (ref 95–110)
CO2 SERPL-SCNC: 34 MMOL/L (ref 22–31)
CREAT BLD-MCNC: 0.88 MG/DL (ref 0.5–1)
DEPRECATED RDW RBC AUTO: 44.5 FL (ref 36.4–46.3)
EOSINOPHIL # BLD AUTO: 0.08 10*3/MM3 (ref 0–0.7)
EOSINOPHIL # BLD MANUAL: 0.1 10*3/MM3 (ref 0–0.7)
EOSINOPHIL NFR BLD AUTO: 2.5 % (ref 0–7)
EOSINOPHIL NFR BLD MANUAL: 3 % (ref 0–7)
ERYTHROCYTE [DISTWIDTH] IN BLOOD BY AUTOMATED COUNT: 13.5 % (ref 11.5–14.5)
GFR SERPL CREATININE-BSD FRML MDRD: 62 ML/MIN/1.73 (ref 60–90)
GLUCOSE BLD-MCNC: 91 MG/DL (ref 60–100)
HCT VFR BLD AUTO: 22.4 % (ref 35–45)
HCT VFR BLD AUTO: 30.4 % (ref 35–45)
HGB BLD-MCNC: 10.4 G/DL (ref 12–15.5)
HGB BLD-MCNC: 7.6 G/DL (ref 12–15.5)
HYPOCHROMIA BLD QL: ABNORMAL
IMM GRANULOCYTES # BLD: 0.01 10*3/MM3 (ref 0–0.02)
IMM GRANULOCYTES NFR BLD: 0.3 % (ref 0–0.5)
LYMPHOCYTES # BLD AUTO: 0.98 10*3/MM3 (ref 0.6–4.2)
LYMPHOCYTES # BLD MANUAL: 0.87 10*3/MM3 (ref 0.6–4.2)
LYMPHOCYTES NFR BLD AUTO: 30.3 % (ref 10–50)
LYMPHOCYTES NFR BLD MANUAL: 18 % (ref 0–12)
LYMPHOCYTES NFR BLD MANUAL: 27 % (ref 10–50)
MCH RBC QN AUTO: 30.6 PG (ref 26.5–34)
MCHC RBC AUTO-ENTMCNC: 33.9 G/DL (ref 31.4–36)
MCV RBC AUTO: 90.3 FL (ref 80–98)
MICROCYTES BLD QL: ABNORMAL
MONOCYTES # BLD AUTO: 0.58 10*3/MM3 (ref 0–0.9)
MONOCYTES # BLD AUTO: 0.79 10*3/MM3 (ref 0–0.9)
MONOCYTES NFR BLD AUTO: 24.5 % (ref 0–12)
NEUTROPHILS # BLD AUTO: 1.31 10*3/MM3 (ref 2–8.6)
NEUTROPHILS # BLD AUTO: 1.49 10*3/MM3 (ref 2–8.6)
NEUTROPHILS NFR BLD AUTO: 40.5 % (ref 37–80)
NEUTROPHILS NFR BLD MANUAL: 44 % (ref 37–80)
NEUTS BAND NFR BLD MANUAL: 2 % (ref 0–5)
PLATELET # BLD AUTO: 194 10*3/MM3 (ref 150–450)
PMV BLD AUTO: 10.6 FL (ref 8–12)
POTASSIUM BLD-SCNC: 3.8 MMOL/L (ref 3.5–5.1)
RBC # BLD AUTO: 2.48 10*6/MM3 (ref 3.77–5.16)
SMALL PLATELETS BLD QL SMEAR: ADEQUATE
SODIUM BLD-SCNC: 140 MMOL/L (ref 137–145)
UNIT  ABO: NORMAL
UNIT  RH: NORMAL
VARIANT LYMPHS NFR BLD MANUAL: 4 % (ref 0–5)
WBC MORPH BLD: NORMAL
WBC NRBC COR # BLD: 3.23 10*3/MM3 (ref 3.2–9.8)

## 2017-03-23 PROCEDURE — 85025 COMPLETE CBC W/AUTO DIFF WBC: CPT | Performed by: HOSPITALIST

## 2017-03-23 PROCEDURE — 94799 UNLISTED PULMONARY SVC/PX: CPT

## 2017-03-23 PROCEDURE — 94760 N-INVAS EAR/PLS OXIMETRY 1: CPT

## 2017-03-23 PROCEDURE — 85007 BL SMEAR W/DIFF WBC COUNT: CPT | Performed by: HOSPITALIST

## 2017-03-23 PROCEDURE — 85018 HEMOGLOBIN: CPT | Performed by: FAMILY MEDICINE

## 2017-03-23 PROCEDURE — 80048 BASIC METABOLIC PNL TOTAL CA: CPT | Performed by: HOSPITALIST

## 2017-03-23 PROCEDURE — 36430 TRANSFUSION BLD/BLD COMPNT: CPT

## 2017-03-23 PROCEDURE — 85014 HEMATOCRIT: CPT | Performed by: FAMILY MEDICINE

## 2017-03-23 PROCEDURE — P9021 RED BLOOD CELLS UNIT: HCPCS

## 2017-03-23 PROCEDURE — 86900 BLOOD TYPING SEROLOGIC ABO: CPT

## 2017-03-23 RX ORDER — ASPIRIN 81 MG/1
81 TABLET, CHEWABLE ORAL DAILY
COMMUNITY
Start: 2017-02-25 | End: 2018-06-21 | Stop reason: HOSPADM

## 2017-03-23 RX ORDER — FUROSEMIDE 10 MG/ML
20 INJECTION INTRAMUSCULAR; INTRAVENOUS AS NEEDED
Status: ACTIVE | OUTPATIENT
Start: 2017-03-23 | End: 2017-03-24

## 2017-03-23 RX ADMIN — GABAPENTIN 100 MG: 100 CAPSULE ORAL at 22:20

## 2017-03-23 RX ADMIN — SOTALOL HYDROCHLORIDE 80 MG: 80 TABLET ORAL at 08:19

## 2017-03-23 RX ADMIN — GABAPENTIN 100 MG: 100 CAPSULE ORAL at 08:17

## 2017-03-23 RX ADMIN — HYDROCODONE BITARTRATE AND ACETAMINOPHEN 1 TABLET: 10; 325 TABLET ORAL at 17:22

## 2017-03-23 RX ADMIN — HYDROCODONE BITARTRATE AND ACETAMINOPHEN 1 TABLET: 10; 325 TABLET ORAL at 08:13

## 2017-03-23 RX ADMIN — ROPINIROLE 1 MG: 1 TABLET ORAL at 22:20

## 2017-03-23 RX ADMIN — GABAPENTIN 100 MG: 100 CAPSULE ORAL at 17:22

## 2017-03-23 RX ADMIN — PANTOPRAZOLE SODIUM 40 MG: 40 TABLET, DELAYED RELEASE ORAL at 08:17

## 2017-03-23 RX ADMIN — LISINOPRIL 10 MG: 10 TABLET ORAL at 08:19

## 2017-03-23 RX ADMIN — ROPINIROLE HYDROCHLORIDE 0.5 MG: 0.5 TABLET, FILM COATED ORAL at 06:44

## 2017-03-23 RX ADMIN — FAMOTIDINE 40 MG: 40 TABLET ORAL at 08:14

## 2017-03-23 RX ADMIN — TRAZODONE HYDROCHLORIDE 50 MG: 50 TABLET ORAL at 22:20

## 2017-03-23 RX ADMIN — SOTALOL HYDROCHLORIDE 80 MG: 80 TABLET ORAL at 17:22

## 2017-03-23 RX ADMIN — ATORVASTATIN CALCIUM 40 MG: 40 TABLET, FILM COATED ORAL at 08:14

## 2017-03-23 NOTE — PLAN OF CARE
Problem: Patient Care Overview (Adult)  Goal: Plan of Care Review  Outcome: Ongoing (interventions implemented as appropriate)    03/23/17 1538   Coping/Psychosocial Response Interventions   Plan Of Care Reviewed With patient   Patient Care Overview   Progress improving   Outcome Evaluation   Outcome Summary/Follow up Plan pt is more alert, VSS, 2 units transfused       Goal: Adult Individualization and Mutuality  Outcome: Ongoing (interventions implemented as appropriate)  Goal: Discharge Needs Assessment  Outcome: Ongoing (interventions implemented as appropriate)    Problem: Gastrointestinal Bleeding (Adult)  Goal: Signs and Symptoms of Listed Potential Problems Will be Absent or Manageable (Gastrointestinal Bleeding)  Outcome: Ongoing (interventions implemented as appropriate)

## 2017-03-23 NOTE — NURSING NOTE
Unable to complete admission assessment, patient has dysphagia and is unable to answer questions, patient answers questions incoherently. Asked patient question and she answered by praying to Vahe Fonseca.

## 2017-03-23 NOTE — PAYOR COMM NOTE
"Sarah Layton (76 y.o. Female)     Date of Birth Social Security Number Address Home Phone MRN    [id number]  [address] [place] [address] [phone] [id number]    Zoroastrianism Marital Status          Sikhism        Admission Date Admission Type Admitting Provider Attending Provider Department, Room/Bed    3/22/17 Emergency Ramírez Dunaway MD Williams, Kevin L, MD [address], [facility]/1    Discharge Date Discharge Disposition Discharge Destination                      Attending Provider: Ramírez Dunaway MD     Allergies:  Iodides, Penicillins    Isolation:  None   Infection:  None   Code Status:  FULL    Ht:  67\" (170.2 cm)   Wt:  155 lb (70.3 kg)    Admission Cmt:  None   Principal Problem:  GI bleed [K92.2]                 Active Insurance as of 3/22/2017     Primary Coverage     Payor Plan Insurance Group Employer/Plan Group    AETNA MEDICARE REPLACEMENT AETNA [id number]     Payor Plan Address Payor Plan Phone Number Effective From Effective To    [address] [phone] 1/1/2017     [place] [address]       Subscriber Name Subscriber Birth Date Member ID       SARAH LAYTON 1940 [id number]                 Emergency Contacts      (Rel.) Home Phone Work Phone Mobile Phone    Keli Hilario (Daughter) [phone] -- --            Problem List           Codes Noted - Resolved       Hospital    COPD (chronic obstructive pulmonary disease) (Chronic) ICD-10-CM: J44.9  ICD-9-CM: 496 3/23/2017 - Present    A-fib (Chronic) ICD-10-CM: I48.91  ICD-9-CM: 427.31 3/23/2017 - Present    Hypertension (Chronic) ICD-10-CM: I10  ICD-9-CM: 401.9 3/23/2017 - Present    Anemia (Chronic) ICD-10-CM: D64.9  ICD-9-CM: 285.9 3/23/2017 - Present    Expressive aphasia (Chronic) ICD-10-CM: R47.01  ICD-9-CM: 784.3 3/23/2017 - Present    History of stroke (Chronic) ICD-10-CM: Z86.73  ICD-9-CM: V12.54 3/23/2017 - Present    * (Principal)GI bleed ICD-10-CM: " K92.2  ICD-9-CM: 578.9 3/22/2017 - Present             History & Physical      Ramírez Dunaway MD at 3/22/2017  6:56 PM                [facility] Admission      Date of Admission: 3/22/2017      Primary Care Physician: Charles Everett MD      Chief Complaint: Weakness, dizziness, anemia    HPI:  The patient is a 76-year-old female who presented with complaints of weakness and dizziness.  Patient has expressive aphasia from her recent stroke so the majority of her history was obtained from her daughter.  The patient's daughter states that she noticed weakness and fatigue worsening on approximately the Thursday prior to admission.  On the day of admission he had a post stroke follow-up with the neurologist in [place].  She was found to have a blood pressure in the 80s systolic, and was dizzy with standing.  The neurologist's office did blood work and then discharged her to go to the [facility] urgent care.  While they were on their way to the urgent care, the office called and stated that the patient's hemoglobin was 7.5 and needed to go to the emergency department.  Neither the patient nor her daughter has noticed any melena or hematochezia.  She was started on Coumadin after her stroke on February 22.  She was transitioned as a result oh on March 9.  Of note she has been feeling more short of breath, and has been using her oxygen more lately     Concurrent Medical History:  has a past medical history of A-fib; Anemia; COPD (chronic obstructive pulmonary disease); Emphysema of lung; Hypertension; and Stroke.    Past Surgical History:  has a past surgical history that includes Cholecystectomy; Hysterectomy; Appendectomy; and Brain surgery.    Family History: family history is not on file.    Social History:  reports that she has been smoking Cigarettes.  She has a 60.00 pack-year smoking history. She does not have any smokeless tobacco history on file. She reports  that she does not drink alcohol or use illicit drugs.    Allergies:   Allergies   Allergen Reactions   • Penicillins        Medications: Scheduled Meds:   Continuous Infusions:   PRN Meds:.•  sodium chloride    Review of Systems:  Review of Systems   Reason unable to perform ROS: expressive aphasia.      Physical Exam:   Temp:  [97.7 °F (36.5 °C)-98.8 °F (37.1 °C)] 97.8 °F (36.6 °C)  Heart Rate:  [59-70] 62  Resp:  [15-18] 16  BP: ()/(45-65) 112/53     Physical Exam   Constitutional: She is oriented to person, place, and time. She appears well-developed and well-nourished.   HENT:   Head: Normocephalic and atraumatic.   Nose: Nose normal.   Mouth/Throat: Oropharynx is clear and moist.   Eyes: Conjunctivae, EOM and lids are normal. Pupils are equal, round, and reactive to light. No scleral icterus.   Neck: Normal range of motion. Neck supple. No JVD present. No tracheal tenderness and no spinous process tenderness present. No rigidity. No tracheal deviation, no edema and normal range of motion present.   Cardiovascular: Normal rate, regular rhythm, S1 normal, S2 normal, normal heart sounds and normal pulses.  Exam reveals no gallop and no friction rub.    No murmur heard.  Pulmonary/Chest: Effort normal and breath sounds normal. No accessory muscle usage. No respiratory distress. She has no decreased breath sounds. She has no wheezes. She has no rales. She exhibits no tenderness.   Abdominal: Soft. Bowel sounds are normal. She exhibits no distension and no mass. There is no tenderness. There is no rebound and no guarding.   Musculoskeletal: She exhibits no edema or tenderness.        Right shoulder: She exhibits normal range of motion, no tenderness and no pain.   Neurological: She is alert and oriented to person, place, and time. She has normal reflexes. She displays no atrophy and normal reflexes. No cranial nerve deficit or sensory deficit. She exhibits normal muscle tone. She displays no seizure activity.  Coordination normal.   Expressive aphasia   Skin: Skin is warm. No rash noted. No pallor.   Psychiatric: She has a normal mood and affect. Her behavior is normal. Judgment and thought content normal.         Results Reviewed:  I have personally reviewed current lab, radiology, and data and agree with results.  Lab Results (last 24 hours)     Procedure Component Value Units Date/Time    Comprehensive Metabolic Panel [[id number]]  (Abnormal) Collected:  03/22/17 1326    Specimen:  Blood Updated:  03/22/17 1354     Glucose 99 mg/dL      BUN 23 (H) mg/dL      Creatinine 0.92 mg/dL      Sodium 136 (L) mmol/L      Potassium 4.1 mmol/L      Chloride 96 mmol/L      CO2 31.0 mmol/L      Calcium 8.9 mg/dL      Total Protein 6.0 (L) g/dL      Albumin 3.60 g/dL      ALT (SGPT) 29 U/L      AST (SGOT) 28 U/L      Alkaline Phosphatase 53 U/L      Total Bilirubin 0.3 mg/dL      eGFR Non African Amer 59 mL/min/1.73      Globulin 2.4 gm/dL      A/G Ratio 1.5 g/dL      BUN/Creatinine Ratio 25.0     Anion Gap 9.0 mmol/L     Narrative:       The MDRD GFR formula is only valid for adults with stable renal function between ages 18 and 70.    CBC Auto Differential [[id number]]  (Abnormal) Collected:  03/22/17 1326    Specimen:  Blood Updated:  03/22/17 1403     WBC 4.54 10*3/mm3      RBC 2.11 (L) 10*6/mm3      Hemoglobin 6.3 (C) g/dL      Hematocrit 19.3 (L) %      MCV 91.5 fL      MCH 29.9 pg      MCHC 32.6 g/dL      RDW 13.6 %      RDW-SD 46.0 fl      MPV 10.8 fL      Platelets 242 10*3/mm3     Protime-INR [[id number]]  (Normal) Collected:  03/22/17 1326    Specimen:  Blood Updated:  03/22/17 1448     Protime 13.3 Seconds      INR 1.01    Narrative:       Therapeutic range for most indications is 2.0-3.0 INR,  or 2.5-3.5 for mechanical heart valves.    CBC & Differential [[id number]] Collected:  03/22/17 1326    Specimen:  Blood Updated:  03/22/17 1513    Narrative:       The following orders were created for panel order CBC &  Differential.  Procedure                               Abnormality         Status                     ---------                               -----------         ------                     Manual Differential[[id number]]           Abnormal            Final result               CBC Auto Differential[[id number]]         Abnormal            Final result                 Please view results for these tests on the individual orders.    Manual Differential [[id number]]  (Abnormal) Collected:  03/22/17 1326    Specimen:  Blood Updated:  03/22/17 1513     Neutrophil % 37.0 %      Lymphocyte % 40.0 %      Monocyte % 18.0 (H) %      Eosinophil % 5.0 %      Neutrophils Absolute 1.68 (L) 10*3/mm3      Lymphocytes Absolute 1.82 10*3/mm3      Monocytes Absolute 0.82 10*3/mm3      Eosinophils Absolute 0.23 10*3/mm3      Polychromasia Slight/1+     WBC Morphology Normal     Platelet Morphology Normal    [place] Draw [[id number]] Collected:  03/22/17 1326    Specimen:  Blood Updated:  03/22/17 1801    Narrative:       The following orders were created for panel order [place] Draw.  Procedure                               Abnormality         Status                     ---------                               -----------         ------                     Light Blue Top[[id number]]                                    Final result               Green Top (Gel)[[id number]]                                   Final result               Lavender Top[[id number]]                                      Final result               Gold Top - SST[[id number]]                                    Final result                 Please view results for these tests on the individual orders.    Light Blue Top [[id number]] Collected:  03/22/17 1326    Specimen:  Blood Updated:  03/22/17 1801     Extra Tube hold for add-on      Auto resulted       Green Top (Gel) [[id number]] Collected:  03/22/17 1326    Specimen:  Blood Updated:  03/22/17 1801     Extra Tube Hold for add-ons.       Auto resulted.       Lavender Top [[id number]] Collected:  03/22/17 1326    Specimen:  Blood Updated:  03/22/17 1801     Extra Tube hold for add-on      Auto resulted       Gold Top - SST [[id number]] Collected:  03/22/17 1326    Specimen:  Blood Updated:  03/22/17 1801     Extra Tube Hold for add-ons.      Auto resulted.           Imaging Results (last 24 hours)     Procedure Component Value Units Date/Time    XR Chest 2 View [[id number]] Collected:  03/22/17 1452     Updated:  03/22/17 1455    Narrative:       Patient Name:  SANNA ZHANG  Patient ID:  [id number]   Ordering:  ERICKA LUJAN  Attending:  IRENE POLK  Referring:  ERICKA LUJAN  ------------------------------------------------  Chest PA and lateral  CLINICAL INDICATION: Shortness of breath. Anemia.    COMPARISON: October 17, 2011.    FINDINGS: Cardiac silhouette is normal in size. Pulmonary  vascularity is unremarkable.     No focal infiltrate or consolidation.  No pleural effusion.  No  pneumothorax.  Subtle fibrotic changes in both apices.    Fluid or thickening of the major fissures seen on lateral view  posteriorly.  The lungs are otherwise clear.      Impression:       CONCLUSION: Subtle bilateral apical fibrotic changes. Fluid  and/or thickening of the major fissures seen on lateral view.  Otherwise unremarkable chest.    Electronically signed by:  Jerrod Palacios MD  3/22/2017 2:54 PM CDT  Workstation: [id number]            Assessment:    Hospital Problem List     GI bleed                Plan:  1.  Symptomatic Anemia:  Patient with a hemoglobin of 6.3.  Complains of weakness, fatigue, dizziness that is worse with standing, and hypotension.   Hemoglobin on March 7 was 12.9.  Patient was started on anticoagulation as a result of a stroke February 22.  Patient is currently getting the first of 2 units of packed red blood cells.  We'll recheck hemoglobin after that.  Dr. Dickson was contacted from the emergency department and has graciously  agreed to see the patient in consultation.  Will follow serial hemoglobin.  2.  Recent cerebrovascular accident:  On February 22, patient has been left with expressive aphasia.  Was felt to be secondary to an embolic event from her atrial fibrillation.  3.  Atrial fibrillation: Has been on anticoagulation, and sotalol.  4.  COPD: No evidence of exacerbation.  5.  Tobacco abuse: Patient is currently not amenable to cessation counseling.  6.  Hypertension  7.  Hyperlipidemia  8.  DVT prophylaxis: Will use SCDs and GEN hose.  Pharmacologic anticoagulation not advised to bleeding.  9.  GI prophylaxis: Protonix.      I discussed the patients findings and my recommendations with: Patient and family.    Ramírez Dunaway MD  03/22/17  6:56 PM                     Electronically signed by Ramírez Dunaway MD at 3/22/2017  7:20 PM           Emergency Department Notes      Yamile Bone at 3/22/2017  5:43 PM          Contacted Sindi in Telemetry to request a telemetry box     Yamile Bone  03/22/17 1744       Electronically signed by Yamile Bone at 3/22/2017  5:44 PM        Vital Signs (last 72 hrs)       03/20 0700  -  03/21 0659 03/21 0700  -  03/22 0659 03/22 0700  -  03/23 0659 03/23 0700  -  03/23 1154   Most Recent    Temp (°F)     96.9 -  98.8    96.6 -  97.7     96.6 (35.9)    Heart Rate     59 -  75    61 -  64     61    Resp     15 -  18      18     18    BP     (!)88/45 -  138/59    103/53 -  104/50     103/53    SpO2 (%)     90 -  100    99 -  100     99          Lab Results (most recent)     Procedure Component Value Units Date/Time    Comprehensive Metabolic Panel [[id number]]  (Abnormal) Collected:  03/22/17 1326    Specimen:  Blood Updated:  03/22/17 1354     Glucose 99 mg/dL      BUN 23 (H) mg/dL      Creatinine 0.92 mg/dL      Sodium 136 (L) mmol/L      Potassium 4.1 mmol/L      Chloride 96 mmol/L      CO2 31.0 mmol/L      Calcium 8.9 mg/dL      Total Protein 6.0 (L) g/dL      Albumin 3.60 g/dL       ALT (SGPT) 29 U/L      AST (SGOT) 28 U/L      Alkaline Phosphatase 53 U/L      Total Bilirubin 0.3 mg/dL      eGFR Non African Amer 59 mL/min/1.73      Globulin 2.4 gm/dL      A/G Ratio 1.5 g/dL      BUN/Creatinine Ratio 25.0     Anion Gap 9.0 mmol/L     Narrative:       The MDRD GFR formula is only valid for adults with stable renal function between ages 18 and 70.    CBC Auto Differential [[id number]]  (Abnormal) Collected:  03/22/17 1326    Specimen:  Blood Updated:  03/22/17 1403     WBC 4.54 10*3/mm3      RBC 2.11 (L) 10*6/mm3      Hemoglobin 6.3 (C) g/dL      Hematocrit 19.3 (L) %      MCV 91.5 fL      MCH 29.9 pg      MCHC 32.6 g/dL      RDW 13.6 %      RDW-SD 46.0 fl      MPV 10.8 fL      Platelets 242 10*3/mm3     Protime-INR [[id number]]  (Normal) Collected:  03/22/17 1326    Specimen:  Blood Updated:  03/22/17 1448     Protime 13.3 Seconds      INR 1.01    Narrative:       Therapeutic range for most indications is 2.0-3.0 INR,  or 2.5-3.5 for mechanical heart valves.    CBC & Differential [[id number]] Collected:  03/22/17 1326    Specimen:  Blood Updated:  03/22/17 1513    Narrative:       The following orders were created for panel order CBC & Differential.  Procedure                               Abnormality         Status                     ---------                               -----------         ------                     Manual Differential[[id number]]           Abnormal            Final result               CBC Auto Differential[[id number]]         Abnormal            Final result                 Please view results for these tests on the individual orders.    Manual Differential [[id number]]  (Abnormal) Collected:  03/22/17 1326    Specimen:  Blood Updated:  03/22/17 1513     Neutrophil % 37.0 %      Lymphocyte % 40.0 %      Monocyte % 18.0 (H) %      Eosinophil % 5.0 %      Neutrophils Absolute 1.68 (L) 10*3/mm3      Lymphocytes Absolute 1.82 10*3/mm3      Monocytes Absolute 0.82 10*3/mm3      Eosinophils  Absolute 0.23 10*3/mm3      Polychromasia Slight/1+     WBC Morphology Normal     Platelet Morphology Normal    [place] Draw [[id number]] Collected:  03/22/17 1326    Specimen:  Blood Updated:  03/22/17 1801    Narrative:       The following orders were created for panel order [place] Draw.  Procedure                               Abnormality         Status                     ---------                               -----------         ------                     Light Blue Top[[id number]]                                    Final result               Green Top (Gel)[[id number]]                                   Final result               Lavender Top[[id number]]                                      Final result               Gold Top - SST[[id number]]                                    Final result                 Please view results for these tests on the individual orders.    Light Blue Top [[id number]] Collected:  03/22/17 1326    Specimen:  Blood Updated:  03/22/17 1801     Extra Tube hold for add-on      Auto resulted       Green Top (Gel) [[id number]] Collected:  03/22/17 1326    Specimen:  Blood Updated:  03/22/17 1801     Extra Tube Hold for add-ons.      Auto resulted.       Lavender Top [[id number]] Collected:  03/22/17 1326    Specimen:  Blood Updated:  03/22/17 1801     Extra Tube hold for add-on      Auto resulted       Gold Top - SST [[id number]] Collected:  03/22/17 1326    Specimen:  Blood Updated:  03/22/17 1801     Extra Tube Hold for add-ons.      Auto resulted.       Reticulocytes [[id number]]  (Abnormal) Collected:  03/22/17 2151    Specimen:  Blood Updated:  03/22/17 2200     Reticulocyte % 3.07 (H) %      Reticulocyte Absolute 0.0725 10*6/mm3      Immature Reticulocyte Fraction 15.5 %      Reticulocyte Production Index 0.67 %      Hematocrit 21.5 (L) %      Reticulated Hgb 25.7 (L) pg     CBC & Differential [[id number]] Collected:  03/22/17 2151    Specimen:  Blood Updated:  03/22/17 2207    Narrative:       The  following orders were created for panel order CBC & Differential.  Procedure                               Abnormality         Status                     ---------                               -----------         ------                     Scan Slide[[id number]]                                                                   CBC Auto Differential[[id number]]         Abnormal            Final result                 Please view results for these tests on the individual orders.    CBC Auto Differential [[id number]]  (Abnormal) Collected:  03/22/17 2151    Specimen:  Blood Updated:  03/22/17 2207     WBC 4.12 10*3/mm3      RBC 2.36 (L) 10*6/mm3      Hemoglobin 7.2 (L) g/dL      Hematocrit 21.5 (L) %      MCV 91.1 fL      MCH 30.5 pg      MCHC 33.5 g/dL      RDW 13.9 %      RDW-SD 46.1 fl      MPV 10.0 fL      Platelets 171 10*3/mm3      Neutrophil % 39.6 %      Lymphocyte % 32.8 %      Monocyte % 22.8 (H) %      Eosinophil % 3.6 %      Basophil % 1.2 %      Immature Grans % 0.0 %      Neutrophils, Absolute 1.63 (L) 10*3/mm3      Lymphocytes, Absolute 1.35 10*3/mm3      Monocytes, Absolute 0.94 (H) 10*3/mm3      Eosinophils, Absolute 0.15 10*3/mm3      Basophils, Absolute 0.05 10*3/mm3      Immature Grans, Absolute 0.00 10*3/mm3     Basic Metabolic Panel [[id number]]  (Abnormal) Collected:  03/22/17 2151    Specimen:  Blood Updated:  03/22/17 2217     Glucose 92 mg/dL      BUN 19 mg/dL      Creatinine 0.87 mg/dL      Sodium 138 mmol/L      Potassium 4.2 mmol/L      Chloride 98 mmol/L      CO2 34.0 (H) mmol/L      Calcium 8.6 mg/dL      eGFR Non African Amer 63 mL/min/1.73      BUN/Creatinine Ratio 21.8     Anion Gap 6.0 mmol/L     Narrative:       The MDRD GFR formula is only valid for adults with stable renal function between ages 18 and 70.    aPTT [[id number]]  (Normal) Collected:  03/22/17 2151    Specimen:  Blood Updated:  03/22/17 2218     PTT 24.0 seconds     Narrative:       The recommended Heparin therapeutic range is  68-97 seconds.    Protime-INR [[id number]]  (Normal) Collected:  03/22/17 2151    Specimen:  Blood Updated:  03/22/17 2218     Protime 13.8 Seconds      INR 1.06    Narrative:       Therapeutic range for most indications is 2.0-3.0 INR,  or 2.5-3.5 for mechanical heart valves.    Iron Profile [[id number]]  (Abnormal) Collected:  03/22/17 2151    Specimen:  Blood Updated:  03/22/17 2225     Iron 32 (L) mcg/dL      TIBC 319 mcg/dL      Iron Saturation 10 (L) %     CBC Auto Differential [[id number]]  (Abnormal) Collected:  03/23/17 0535    Specimen:  Blood Updated:  03/23/17 0643     WBC 3.23 10*3/mm3      RBC 2.48 (L) 10*6/mm3      Hemoglobin 7.6 (L) g/dL      Hematocrit 22.4 (L) %      MCV 90.3 fL      MCH 30.6 pg      MCHC 33.9 g/dL      RDW 13.5 %      RDW-SD 44.5 fl      MPV 10.6 fL      Platelets 194 10*3/mm3      Neutrophil % 40.5 %      Lymphocyte % 30.3 %      Monocyte % 24.5 (H) %      Eosinophil % 2.5 %      Basophil % 1.9 %      Immature Grans % 0.3 %      Neutrophils, Absolute 1.31 (L) 10*3/mm3      Lymphocytes, Absolute 0.98 10*3/mm3      Monocytes, Absolute 0.79 10*3/mm3      Eosinophils, Absolute 0.08 10*3/mm3      Basophils, Absolute 0.06 10*3/mm3      Immature Grans, Absolute 0.01 10*3/mm3     Manual Differential [[id number]] Collected:  03/23/17 0535    Specimen:  Blood Updated:  03/23/17 0643    CBC & Differential [[id number]] Collected:  03/23/17 0535    Specimen:  Blood Updated:  03/23/17 0644    Narrative:       The following orders were created for panel order CBC & Differential.  Procedure                               Abnormality         Status                     ---------                               -----------         ------                     Manual Differential[[id number]]                               In process                 Scan Slide[[id number]]                                                                   CBC Auto Differential[[id number]]         Abnormal            Final result                  Please view results for these tests on the individual orders.    Basic Metabolic Panel [[id number]]  (Abnormal) Collected:  03/23/17 0535    Specimen:  Blood Updated:  03/23/17 0646     Glucose 91 mg/dL      BUN 17 mg/dL      Creatinine 0.88 mg/dL      Sodium 140 mmol/L      Potassium 3.8 mmol/L      Chloride 98 mmol/L      CO2 34.0 (H) mmol/L      Calcium 8.4 mg/dL      eGFR Non African Amer 62 mL/min/1.73      BUN/Creatinine Ratio 19.3     Anion Gap 8.0 mmol/L     Narrative:       The MDRD GFR formula is only valid for adults with stable renal function between ages 18 and 70.          Imaging Results (most recent)     Procedure Component Value Units Date/Time    XR Chest 2 View [[id number]] Collected:  03/22/17 1452     Updated:  03/22/17 1455    Narrative:       Patient Name:  SANNA ZHANG  Patient ID:  [id number]   Ordering:  ERICKA LUJAN  Attending:  IRENE POLK  Referring:  ERICKA LUJAN  ------------------------------------------------  Chest PA and lateral  CLINICAL INDICATION: Shortness of breath. Anemia.    COMPARISON: October 17, 2011.    FINDINGS: Cardiac silhouette is normal in size. Pulmonary  vascularity is unremarkable.     No focal infiltrate or consolidation.  No pleural effusion.  No  pneumothorax.  Subtle fibrotic changes in both apices.    Fluid or thickening of the major fissures seen on lateral view  posteriorly.  The lungs are otherwise clear.      Impression:       CONCLUSION: Subtle bilateral apical fibrotic changes. Fluid  and/or thickening of the major fissures seen on lateral view.  Otherwise unremarkable chest.    Electronically signed by:  Jerrod Palacios MD  3/22/2017 2:54 PM CDT  Workstation: [id number]          ECG/EMG Results (most recent)     Procedure Component Value Units Date/Time    ECG 12 Lead [[id number]] Collected:  03/22/17 1503     Updated:  03/23/17 1022    Narrative:       Test Reason : anemiaBlood Pressure : **/** mmHGVent. Rate : 062 BPM     Atrial Rate :  062 BPM   P-R Int : 154 ms          QRS Dur : 070 ms    QT Int : 400 ms       P-R-T Axes : 070 075 108 degrees   QTc Int : 406 msNormal sinus rhythmNonspecific ST and T   wave abnormalityAbnormal ECGConfirmed by YULY LALA, SNOW ([id number]),  NIMA TRISTAN ([id number]) on3/23/2017 10:22:34 AMReferred By:             Confirmed By:SNOW EMERY MD          Operative/Procedure Notes (most recent note)     No notes of this type exist for this encounter.           Physician Progress Notes (most recent note)      Louisa Cottrell MD at 3/23/2017  9:36 AM  Version 1 of 1         Progress Note  Louisa Cottrell MD  Family Medicine Resident, PGY 1     LOS: 1 day   Patient Care Team:  Charles Everett MD as PCP - General    Chief Complaint: dizziness, weakness, anemia    Subjective     Interval History:     Patient reports that she is feeling better, no issues at bedside with communication: patient has history of aphasia likely exacerbated by symptomatic anemia. Daughter at bedside was there to assist with communication.    History taken from: chart, patient, family    Medication Review:   Current Facility-Administered Medications   Medication Dose Route Frequency Provider Last Rate Last Dose   • atorvastatin (LIPITOR) tablet 40 mg  40 mg Oral Daily Ramírez Dunaway MD   40 mg at 03/23/17 0814   • famotidine (PEPCID) tablet 40 mg  40 mg Oral Daily Ramírez Dunaway MD   40 mg at 03/23/17 0814   • furosemide (LASIX) tablet 20 mg  20 mg Oral Once per day on Mon Wed Fri Ramírez Dunaway MD       • gabapentin (NEURONTIN) capsule 100 mg  100 mg Oral TID Ramírez Dunaway MD   100 mg at 03/23/17 0817   • HYDROcodone-acetaminophen (NORCO)  MG per tablet 1 tablet  1 tablet Oral BID Ramírez Dunaway MD   1 tablet at 03/23/17 0813   • lisinopril (PRINIVIL,ZESTRIL) tablet 10 mg  10 mg Oral Daily Ramírez Dunaway MD   10 mg at 03/23/17 0819   • pantoprazole (PROTONIX) EC tablet 40 mg  40 mg Oral Daily Ramírez AGUIAR  MD Gume   40 mg at 03/23/17 0817   • rOPINIRole (REQUIP) tablet 0.5 mg  0.5 mg Oral QAM Ramírez Dunaway MD   0.5 mg at 03/23/17 0644   • rOPINIRole (REQUIP) tablet 1 mg  1 mg Oral Nightly Ramírez Dunaway MD   1 mg at 03/22/17 2200   • sodium chloride 0.9 % flush 1-10 mL  1-10 mL Intravenous PRN Ramírez Dunaway MD       • sodium chloride 0.9 % flush 10 mL  10 mL Intravenous PRN Haim Motley MD       • sotalol (BETAPACE) tablet 80 mg  80 mg Oral BID Ramírez Dunaway MD   80 mg at 03/23/17 0819   • traZODone (DESYREL) tablet 50 mg  50 mg Oral Nightly Ramírez Dunaway MD   50 mg at 03/22/17 2202       Review of Systems:   Review of Systems   Constitutional: Negative.  Negative for fatigue and fever.   HENT: Negative.  Negative for ear pain and sore throat.    Eyes: Negative.  Negative for pain and visual disturbance.   Respiratory: Negative.  Negative for cough and shortness of breath.    Cardiovascular: Negative.  Negative for chest pain and palpitations.   Gastrointestinal: Negative for abdominal pain and nausea.   Endocrine: Negative.    Genitourinary: Negative for dysuria.   Musculoskeletal: Negative for arthralgias.   Skin: Negative for rash.   Allergic/Immunologic: Negative.    Neurological: Negative for dizziness, weakness and headaches.   Psychiatric/Behavioral: Negative for sleep disturbance.       Objective     Vital Signs  Temp:  [96.9 °F (36.1 °C)-98.8 °F (37.1 °C)] 97.7 °F (36.5 °C)  Heart Rate:  [59-75] 64  Resp:  [15-18] 18  BP: ()/(45-65) 104/50    Physical Exam:  Physical Exam   Constitutional: She is oriented to person, place, and time. She appears well-developed and well-nourished.   HENT:   Head: Normocephalic and atraumatic.   Eyes: EOM are normal. Pupils are equal, round, and reactive to light.   Neck: Normal range of motion.   Cardiovascular: Normal rate, regular rhythm and normal heart sounds.  Exam reveals no gallop and no friction rub.    No murmur  heard.  Pulmonary/Chest: Effort normal and breath sounds normal. No respiratory distress. She has no wheezes. She has no rales. She exhibits no tenderness.   Abdominal: Soft. Bowel sounds are normal. She exhibits no distension and no mass. There is no tenderness. There is no rebound and no guarding. No hernia.   Musculoskeletal: Normal range of motion.   Neurological: She is alert and oriented to person, place, and time.   Patient has hx aphasia   Skin: Skin is warm and dry. There is pallor.   Psychiatric: She has a normal mood and affect. Her behavior is normal. Judgment and thought content normal.        Results Review:    Lab Results (last 24 hours)     Procedure Component Value Units Date/Time    Comprehensive Metabolic Panel [[id number]]  (Abnormal) Collected:  03/22/17 1326    Specimen:  Blood Updated:  03/22/17 1354     Glucose 99 mg/dL      BUN 23 (H) mg/dL      Creatinine 0.92 mg/dL      Sodium 136 (L) mmol/L      Potassium 4.1 mmol/L      Chloride 96 mmol/L      CO2 31.0 mmol/L      Calcium 8.9 mg/dL      Total Protein 6.0 (L) g/dL      Albumin 3.60 g/dL      ALT (SGPT) 29 U/L      AST (SGOT) 28 U/L      Alkaline Phosphatase 53 U/L      Total Bilirubin 0.3 mg/dL      eGFR Non African Amer 59 mL/min/1.73      Globulin 2.4 gm/dL      A/G Ratio 1.5 g/dL      BUN/Creatinine Ratio 25.0     Anion Gap 9.0 mmol/L     Narrative:       The MDRD GFR formula is only valid for adults with stable renal function between ages 18 and 70.    CBC Auto Differential [[id number]]  (Abnormal) Collected:  03/22/17 1326    Specimen:  Blood Updated:  03/22/17 1403     WBC 4.54 10*3/mm3      RBC 2.11 (L) 10*6/mm3      Hemoglobin 6.3 (C) g/dL      Hematocrit 19.3 (L) %      MCV 91.5 fL      MCH 29.9 pg      MCHC 32.6 g/dL      RDW 13.6 %      RDW-SD 46.0 fl      MPV 10.8 fL      Platelets 242 10*3/mm3     Protime-INR [[id number]]  (Normal) Collected:  03/22/17 1326    Specimen:  Blood Updated:  03/22/17 1448     Protime 13.3 Seconds       INR 1.01    Narrative:       Therapeutic range for most indications is 2.0-3.0 INR,  or 2.5-3.5 for mechanical heart valves.    CBC & Differential [[id number]] Collected:  03/22/17 1326    Specimen:  Blood Updated:  03/22/17 1513    Narrative:       The following orders were created for panel order CBC & Differential.  Procedure                               Abnormality         Status                     ---------                               -----------         ------                     Manual Differential[[id number]]           Abnormal            Final result               CBC Auto Differential[[id number]]         Abnormal            Final result                 Please view results for these tests on the individual orders.    Manual Differential [[id number]]  (Abnormal) Collected:  03/22/17 1326    Specimen:  Blood Updated:  03/22/17 1513     Neutrophil % 37.0 %      Lymphocyte % 40.0 %      Monocyte % 18.0 (H) %      Eosinophil % 5.0 %      Neutrophils Absolute 1.68 (L) 10*3/mm3      Lymphocytes Absolute 1.82 10*3/mm3      Monocytes Absolute 0.82 10*3/mm3      Eosinophils Absolute 0.23 10*3/mm3      Polychromasia Slight/1+     WBC Morphology Normal     Platelet Morphology Normal    [place] Draw [[id number]] Collected:  03/22/17 1326    Specimen:  Blood Updated:  03/22/17 9321    Narrative:       The following orders were created for panel order [place] Draw.  Procedure                               Abnormality         Status                     ---------                               -----------         ------                     Light Blue Top[[id number]]                                    Final result               Green Top (Gel)[[id number]]                                   Final result               Lavender Top[[id number]]                                      Final result               Gold Top - SST[[id number]]                                    Final result                 Please view results for these tests on the  individual orders.    Light Blue Top [[id number]] Collected:  03/22/17 1326    Specimen:  Blood Updated:  03/22/17 1801     Extra Tube hold for add-on      Auto resulted       Green Top (Gel) [[id number]] Collected:  03/22/17 1326    Specimen:  Blood Updated:  03/22/17 1801     Extra Tube Hold for add-ons.      Auto resulted.       Lavender Top [[id number]] Collected:  03/22/17 1326    Specimen:  Blood Updated:  03/22/17 1801     Extra Tube hold for add-on      Auto resulted       Gold Top - SST [[id number]] Collected:  03/22/17 1326    Specimen:  Blood Updated:  03/22/17 1801     Extra Tube Hold for add-ons.      Auto resulted.       Reticulocytes [[id number]]  (Abnormal) Collected:  03/22/17 2151    Specimen:  Blood Updated:  03/22/17 2200     Reticulocyte % 3.07 (H) %      Reticulocyte Absolute 0.0725 10*6/mm3      Immature Reticulocyte Fraction 15.5 %      Reticulocyte Production Index 0.67 %      Hematocrit 21.5 (L) %      Reticulated Hgb 25.7 (L) pg     CBC & Differential [[id number]] Collected:  03/22/17 2151    Specimen:  Blood Updated:  03/22/17 2207    Narrative:       The following orders were created for panel order CBC & Differential.  Procedure                               Abnormality         Status                     ---------                               -----------         ------                     Scan Slide[[id number]]                                                                   CBC Auto Differential[[id number]]         Abnormal            Final result                 Please view results for these tests on the individual orders.    CBC Auto Differential [[id number]]  (Abnormal) Collected:  03/22/17 2151    Specimen:  Blood Updated:  03/22/17 2207     WBC 4.12 10*3/mm3      RBC 2.36 (L) 10*6/mm3      Hemoglobin 7.2 (L) g/dL      Hematocrit 21.5 (L) %      MCV 91.1 fL      MCH 30.5 pg      MCHC 33.5 g/dL      RDW 13.9 %      RDW-SD 46.1 fl      MPV 10.0 fL      Platelets 171 10*3/mm3      Neutrophil % 39.6 %       Lymphocyte % 32.8 %      Monocyte % 22.8 (H) %      Eosinophil % 3.6 %      Basophil % 1.2 %      Immature Grans % 0.0 %      Neutrophils, Absolute 1.63 (L) 10*3/mm3      Lymphocytes, Absolute 1.35 10*3/mm3      Monocytes, Absolute 0.94 (H) 10*3/mm3      Eosinophils, Absolute 0.15 10*3/mm3      Basophils, Absolute 0.05 10*3/mm3      Immature Grans, Absolute 0.00 10*3/mm3     Basic Metabolic Panel [[id number]]  (Abnormal) Collected:  03/22/17 2151    Specimen:  Blood Updated:  03/22/17 2217     Glucose 92 mg/dL      BUN 19 mg/dL      Creatinine 0.87 mg/dL      Sodium 138 mmol/L      Potassium 4.2 mmol/L      Chloride 98 mmol/L      CO2 34.0 (H) mmol/L      Calcium 8.6 mg/dL      eGFR Non African Amer 63 mL/min/1.73      BUN/Creatinine Ratio 21.8     Anion Gap 6.0 mmol/L     Narrative:       The MDRD GFR formula is only valid for adults with stable renal function between ages 18 and 70.    aPTT [[id number]]  (Normal) Collected:  03/22/17 2151    Specimen:  Blood Updated:  03/22/17 2218     PTT 24.0 seconds     Narrative:       The recommended Heparin therapeutic range is 68-97 seconds.    Protime-INR [[id number]]  (Normal) Collected:  03/22/17 2151    Specimen:  Blood Updated:  03/22/17 2218     Protime 13.8 Seconds      INR 1.06    Narrative:       Therapeutic range for most indications is 2.0-3.0 INR,  or 2.5-3.5 for mechanical heart valves.    Iron Profile [[id number]]  (Abnormal) Collected:  03/22/17 2151    Specimen:  Blood Updated:  03/22/17 2225     Iron 32 (L) mcg/dL      TIBC 319 mcg/dL      Iron Saturation 10 (L) %     CBC Auto Differential [[id number]]  (Abnormal) Collected:  03/23/17 0535    Specimen:  Blood Updated:  03/23/17 0643     WBC 3.23 10*3/mm3      RBC 2.48 (L) 10*6/mm3      Hemoglobin 7.6 (L) g/dL      Hematocrit 22.4 (L) %      MCV 90.3 fL      MCH 30.6 pg      MCHC 33.9 g/dL      RDW 13.5 %      RDW-SD 44.5 fl      MPV 10.6 fL      Platelets 194 10*3/mm3      Neutrophil % 40.5 %      Lymphocyte %  30.3 %      Monocyte % 24.5 (H) %      Eosinophil % 2.5 %      Basophil % 1.9 %      Immature Grans % 0.3 %      Neutrophils, Absolute 1.31 (L) 10*3/mm3      Lymphocytes, Absolute 0.98 10*3/mm3      Monocytes, Absolute 0.79 10*3/mm3      Eosinophils, Absolute 0.08 10*3/mm3      Basophils, Absolute 0.06 10*3/mm3      Immature Grans, Absolute 0.01 10*3/mm3     Manual Differential [[id number]] Collected:  03/23/17 0535    Specimen:  Blood Updated:  03/23/17 0643    CBC & Differential [[id number]] Collected:  03/23/17 0535    Specimen:  Blood Updated:  03/23/17 0644    Narrative:       The following orders were created for panel order CBC & Differential.  Procedure                               Abnormality         Status                     ---------                               -----------         ------                     Manual Differential[[id number]]                               In process                 Scan Slide[[id number]]                                                                   CBC Auto Differential[[id number]]         Abnormal            Final result                 Please view results for these tests on the individual orders.    Basic Metabolic Panel [[id number]]  (Abnormal) Collected:  03/23/17 0535    Specimen:  Blood Updated:  03/23/17 0646     Glucose 91 mg/dL      BUN 17 mg/dL      Creatinine 0.88 mg/dL      Sodium 140 mmol/L      Potassium 3.8 mmol/L      Chloride 98 mmol/L      CO2 34.0 (H) mmol/L      Calcium 8.4 mg/dL      eGFR Non African Amer 62 mL/min/1.73      BUN/Creatinine Ratio 19.3     Anion Gap 8.0 mmol/L     Narrative:       The MDRD GFR formula is only valid for adults with stable renal function between ages 18 and 70.          Imaging Results (last 24 hours)     Procedure Component Value Units Date/Time    XR Chest 2 View [[id number]] Collected:  03/22/17 1452     Updated:  03/22/17 1455    Narrative:       Patient Name:  SANNA ZHANG  Patient ID:  [id number]   Ordering:  ERICKA AQUINO  LE  Attending:  IRENE POLK  Referring:  ERICKA LUJAN  ------------------------------------------------  Chest PA and lateral  CLINICAL INDICATION: Shortness of breath. Anemia.    COMPARISON: October 17, 2011.    FINDINGS: Cardiac silhouette is normal in size. Pulmonary  vascularity is unremarkable.     No focal infiltrate or consolidation.  No pleural effusion.  No  pneumothorax.  Subtle fibrotic changes in both apices.    Fluid or thickening of the major fissures seen on lateral view  posteriorly.  The lungs are otherwise clear.      Impression:       CONCLUSION: Subtle bilateral apical fibrotic changes. Fluid  and/or thickening of the major fissures seen on lateral view.  Otherwise unremarkable chest.    Electronically signed by:  Jerrod Palacios MD  3/22/2017 2:54 PM CDT  Workstation: [id number]          Assessment/Plan     Principal Problem:    GI bleed  Active Problems:    COPD (chronic obstructive pulmonary disease)    A-fib    Hypertension    Anemia    Expressive aphasia    History of stroke    Patient is s/p 1 unit pRBCs for symptomatic anemia with continued symptommatic anemia will give 2 more units; Dr. Dickson is to see the patient today for evaluation of source of bleed with additional plan to follow. Will hold coagulation in setting of potential active bleed and monitor patient for signs of continued bleeding.           This document has been electronically signed by Louisa Cottrell MD on March 23, 2017 9:36 AM

## 2017-03-23 NOTE — PROGRESS NOTES
Progress Note  Louisa Cottrell MD  Family Medicine Resident, PGY 1     LOS: 1 day   Patient Care Team:  Charles Everett MD as PCP - General    Chief Complaint: dizziness, weakness, anemia    Subjective     Interval History:     Patient reports that she is feeling better, no issues at bedside with communication: patient has history of aphasia likely exacerbated by symptomatic anemia. Daughter at bedside was there to assist with communication.    History taken from: chart, patient, family    Medication Review:   Current Facility-Administered Medications   Medication Dose Route Frequency Provider Last Rate Last Dose   • atorvastatin (LIPITOR) tablet 40 mg  40 mg Oral Daily Ramírez Dunaway MD   40 mg at 03/23/17 0814   • famotidine (PEPCID) tablet 40 mg  40 mg Oral Daily Ramírez Dunaway MD   40 mg at 03/23/17 0814   • furosemide (LASIX) tablet 20 mg  20 mg Oral Once per day on Mon Wed Fri Ramírez Dunaway MD       • gabapentin (NEURONTIN) capsule 100 mg  100 mg Oral TID Ramírez Dunaway MD   100 mg at 03/23/17 0817   • HYDROcodone-acetaminophen (NORCO)  MG per tablet 1 tablet  1 tablet Oral BID Ramírez Dunaway MD   1 tablet at 03/23/17 0813   • lisinopril (PRINIVIL,ZESTRIL) tablet 10 mg  10 mg Oral Daily Ramírez Dunaway MD   10 mg at 03/23/17 0819   • pantoprazole (PROTONIX) EC tablet 40 mg  40 mg Oral Daily Ramírez Dunaway MD   40 mg at 03/23/17 0817   • rOPINIRole (REQUIP) tablet 0.5 mg  0.5 mg Oral QAM Ramírez Dunaway MD   0.5 mg at 03/23/17 0644   • rOPINIRole (REQUIP) tablet 1 mg  1 mg Oral Nightly Ramírez Dunaway MD   1 mg at 03/22/17 2200   • sodium chloride 0.9 % flush 1-10 mL  1-10 mL Intravenous PRN Ramírez Dunaway MD       • sodium chloride 0.9 % flush 10 mL  10 mL Intravenous PRN Haim Motley MD       • sotalol (BETAPACE) tablet 80 mg  80 mg Oral BID Ramírez Dunaway MD   80 mg at 03/23/17 0819   • traZODone (DESYREL) tablet 50 mg  50 mg Oral Nightly Ramírez Dunaway  MD   50 mg at 03/22/17 4453       Review of Systems:   Review of Systems   Constitutional: Negative.  Negative for fatigue and fever.   HENT: Negative.  Negative for ear pain and sore throat.    Eyes: Negative.  Negative for pain and visual disturbance.   Respiratory: Negative.  Negative for cough and shortness of breath.    Cardiovascular: Negative.  Negative for chest pain and palpitations.   Gastrointestinal: Negative for abdominal pain and nausea.   Endocrine: Negative.    Genitourinary: Negative for dysuria.   Musculoskeletal: Negative for arthralgias.   Skin: Negative for rash.   Allergic/Immunologic: Negative.    Neurological: Negative for dizziness, weakness and headaches.   Psychiatric/Behavioral: Negative for sleep disturbance.       Objective     Vital Signs  Temp:  [96.9 °F (36.1 °C)-98.8 °F (37.1 °C)] 97.7 °F (36.5 °C)  Heart Rate:  [59-75] 64  Resp:  [15-18] 18  BP: ()/(45-65) 104/50    Physical Exam:  Physical Exam   Constitutional: She appears well-developed and well-nourished.   HENT:   Head: Normocephalic and atraumatic.   Eyes: EOM are normal. Pupils are equal, round, and reactive to light.   Neck: Normal range of motion.   Cardiovascular: Normal rate, regular rhythm and normal heart sounds.  Exam reveals no gallop and no friction rub.    No murmur heard.  Pulmonary/Chest: Effort normal and breath sounds normal. No respiratory distress. She has no wheezes. She has no rales. She exhibits no tenderness.   Abdominal: Soft. Bowel sounds are normal. She exhibits no distension and no mass. There is tenderness. There is no rebound and no guarding. No hernia.   Musculoskeletal: Normal range of motion.   Neurological: She is alert.   Patient has hx aphasia   Skin: Skin is warm and dry. There is pallor.   Psychiatric: She has a normal mood and affect. Her behavior is normal. Judgment and thought content normal.        Results Review:    Lab Results (last 24 hours)     Procedure Component Value Units  Date/Time    Comprehensive Metabolic Panel [[id number]]  (Abnormal) Collected:  03/22/17 1326    Specimen:  Blood Updated:  03/22/17 1354     Glucose 99 mg/dL      BUN 23 (H) mg/dL      Creatinine 0.92 mg/dL      Sodium 136 (L) mmol/L      Potassium 4.1 mmol/L      Chloride 96 mmol/L      CO2 31.0 mmol/L      Calcium 8.9 mg/dL      Total Protein 6.0 (L) g/dL      Albumin 3.60 g/dL      ALT (SGPT) 29 U/L      AST (SGOT) 28 U/L      Alkaline Phosphatase 53 U/L      Total Bilirubin 0.3 mg/dL      eGFR Non African Amer 59 mL/min/1.73      Globulin 2.4 gm/dL      A/G Ratio 1.5 g/dL      BUN/Creatinine Ratio 25.0     Anion Gap 9.0 mmol/L     Narrative:       The MDRD GFR formula is only valid for adults with stable renal function between ages 18 and 70.    CBC Auto Differential [[id number]]  (Abnormal) Collected:  03/22/17 1326    Specimen:  Blood Updated:  03/22/17 1403     WBC 4.54 10*3/mm3      RBC 2.11 (L) 10*6/mm3      Hemoglobin 6.3 (C) g/dL      Hematocrit 19.3 (L) %      MCV 91.5 fL      MCH 29.9 pg      MCHC 32.6 g/dL      RDW 13.6 %      RDW-SD 46.0 fl      MPV 10.8 fL      Platelets 242 10*3/mm3     Protime-INR [[id number]]  (Normal) Collected:  03/22/17 1326    Specimen:  Blood Updated:  03/22/17 1448     Protime 13.3 Seconds      INR 1.01    Narrative:       Therapeutic range for most indications is 2.0-3.0 INR,  or 2.5-3.5 for mechanical heart valves.    CBC & Differential [[id number]] Collected:  03/22/17 1326    Specimen:  Blood Updated:  03/22/17 2733    Narrative:       The following orders were created for panel order CBC & Differential.  Procedure                               Abnormality         Status                     ---------                               -----------         ------                     Manual Differential[[id number]]           Abnormal            Final result               CBC Auto Differential[[id number]]         Abnormal            Final result                 Please view results for these  tests on the individual orders.    Manual Differential [[id number]]  (Abnormal) Collected:  03/22/17 1326    Specimen:  Blood Updated:  03/22/17 1513     Neutrophil % 37.0 %      Lymphocyte % 40.0 %      Monocyte % 18.0 (H) %      Eosinophil % 5.0 %      Neutrophils Absolute 1.68 (L) 10*3/mm3      Lymphocytes Absolute 1.82 10*3/mm3      Monocytes Absolute 0.82 10*3/mm3      Eosinophils Absolute 0.23 10*3/mm3      Polychromasia Slight/1+     WBC Morphology Normal     Platelet Morphology Normal    [place] Draw [[id number]] Collected:  03/22/17 1326    Specimen:  Blood Updated:  03/22/17 1801    Narrative:       The following orders were created for panel order [place] Draw.  Procedure                               Abnormality         Status                     ---------                               -----------         ------                     Light Blue Top[[id number]]                                    Final result               Green Top (Gel)[[id number]]                                   Final result               Lavender Top[[id number]]                                      Final result               Gold Top - SST[[id number]]                                    Final result                 Please view results for these tests on the individual orders.    Light Blue Top [[id number]] Collected:  03/22/17 1326    Specimen:  Blood Updated:  03/22/17 1801     Extra Tube hold for add-on      Auto resulted       Green Top (Gel) [[id number]] Collected:  03/22/17 1326    Specimen:  Blood Updated:  03/22/17 1801     Extra Tube Hold for add-ons.      Auto resulted.       Lavender Top [[id number]] Collected:  03/22/17 1326    Specimen:  Blood Updated:  03/22/17 1801     Extra Tube hold for add-on      Auto resulted       Gold Top - SST [[id number]] Collected:  03/22/17 1326    Specimen:  Blood Updated:  03/22/17 1801     Extra Tube Hold for add-ons.      Auto resulted.       Reticulocytes [[id number]]  (Abnormal) Collected:  03/22/17 2391     Specimen:  Blood Updated:  03/22/17 2200     Reticulocyte % 3.07 (H) %      Reticulocyte Absolute 0.0725 10*6/mm3      Immature Reticulocyte Fraction 15.5 %      Reticulocyte Production Index 0.67 %      Hematocrit 21.5 (L) %      Reticulated Hgb 25.7 (L) pg     CBC & Differential [[id number]] Collected:  03/22/17 2151    Specimen:  Blood Updated:  03/22/17 2207    Narrative:       The following orders were created for panel order CBC & Differential.  Procedure                               Abnormality         Status                     ---------                               -----------         ------                     Scan Slide[[id number]]                                                                   CBC Auto Differential[[id number]]         Abnormal            Final result                 Please view results for these tests on the individual orders.    CBC Auto Differential [[id number]]  (Abnormal) Collected:  03/22/17 2151    Specimen:  Blood Updated:  03/22/17 2207     WBC 4.12 10*3/mm3      RBC 2.36 (L) 10*6/mm3      Hemoglobin 7.2 (L) g/dL      Hematocrit 21.5 (L) %      MCV 91.1 fL      MCH 30.5 pg      MCHC 33.5 g/dL      RDW 13.9 %      RDW-SD 46.1 fl      MPV 10.0 fL      Platelets 171 10*3/mm3      Neutrophil % 39.6 %      Lymphocyte % 32.8 %      Monocyte % 22.8 (H) %      Eosinophil % 3.6 %      Basophil % 1.2 %      Immature Grans % 0.0 %      Neutrophils, Absolute 1.63 (L) 10*3/mm3      Lymphocytes, Absolute 1.35 10*3/mm3      Monocytes, Absolute 0.94 (H) 10*3/mm3      Eosinophils, Absolute 0.15 10*3/mm3      Basophils, Absolute 0.05 10*3/mm3      Immature Grans, Absolute 0.00 10*3/mm3     Basic Metabolic Panel [[id number]]  (Abnormal) Collected:  03/22/17 2151    Specimen:  Blood Updated:  03/22/17 2217     Glucose 92 mg/dL      BUN 19 mg/dL      Creatinine 0.87 mg/dL      Sodium 138 mmol/L      Potassium 4.2 mmol/L      Chloride 98 mmol/L      CO2 34.0 (H) mmol/L      Calcium 8.6 mg/dL      eGFR Non   Amer 63 mL/min/1.73      BUN/Creatinine Ratio 21.8     Anion Gap 6.0 mmol/L     Narrative:       The MDRD GFR formula is only valid for adults with stable renal function between ages 18 and 70.    aPTT [[id number]]  (Normal) Collected:  03/22/17 2151    Specimen:  Blood Updated:  03/22/17 2218     PTT 24.0 seconds     Narrative:       The recommended Heparin therapeutic range is 68-97 seconds.    Protime-INR [[id number]]  (Normal) Collected:  03/22/17 2151    Specimen:  Blood Updated:  03/22/17 2218     Protime 13.8 Seconds      INR 1.06    Narrative:       Therapeutic range for most indications is 2.0-3.0 INR,  or 2.5-3.5 for mechanical heart valves.    Iron Profile [[id number]]  (Abnormal) Collected:  03/22/17 2151    Specimen:  Blood Updated:  03/22/17 2225     Iron 32 (L) mcg/dL      TIBC 319 mcg/dL      Iron Saturation 10 (L) %     CBC Auto Differential [[id number]]  (Abnormal) Collected:  03/23/17 0535    Specimen:  Blood Updated:  03/23/17 0643     WBC 3.23 10*3/mm3      RBC 2.48 (L) 10*6/mm3      Hemoglobin 7.6 (L) g/dL      Hematocrit 22.4 (L) %      MCV 90.3 fL      MCH 30.6 pg      MCHC 33.9 g/dL      RDW 13.5 %      RDW-SD 44.5 fl      MPV 10.6 fL      Platelets 194 10*3/mm3      Neutrophil % 40.5 %      Lymphocyte % 30.3 %      Monocyte % 24.5 (H) %      Eosinophil % 2.5 %      Basophil % 1.9 %      Immature Grans % 0.3 %      Neutrophils, Absolute 1.31 (L) 10*3/mm3      Lymphocytes, Absolute 0.98 10*3/mm3      Monocytes, Absolute 0.79 10*3/mm3      Eosinophils, Absolute 0.08 10*3/mm3      Basophils, Absolute 0.06 10*3/mm3      Immature Grans, Absolute 0.01 10*3/mm3     Manual Differential [[id number]] Collected:  03/23/17 0535    Specimen:  Blood Updated:  03/23/17 0643    CBC & Differential [[id number]] Collected:  03/23/17 0535    Specimen:  Blood Updated:  03/23/17 0644    Narrative:       The following orders were created for panel order CBC & Differential.  Procedure                                Abnormality         Status                     ---------                               -----------         ------                     Manual Differential[[id number]]                               In process                 Scan Slide[[id number]]                                                                   CBC Auto Differential[[id number]]         Abnormal            Final result                 Please view results for these tests on the individual orders.    Basic Metabolic Panel [[id number]]  (Abnormal) Collected:  03/23/17 0535    Specimen:  Blood Updated:  03/23/17 0646     Glucose 91 mg/dL      BUN 17 mg/dL      Creatinine 0.88 mg/dL      Sodium 140 mmol/L      Potassium 3.8 mmol/L      Chloride 98 mmol/L      CO2 34.0 (H) mmol/L      Calcium 8.4 mg/dL      eGFR Non African Amer 62 mL/min/1.73      BUN/Creatinine Ratio 19.3     Anion Gap 8.0 mmol/L     Narrative:       The MDRD GFR formula is only valid for adults with stable renal function between ages 18 and 70.          Imaging Results (last 24 hours)     Procedure Component Value Units Date/Time    XR Chest 2 View [[id number]] Collected:  03/22/17 1452     Updated:  03/22/17 1455    Narrative:       Patient Name:  SANNA ZHANG  Patient ID:  [id number]   Ordering:  ERICKA LUJAN  Attending:  IRENE POLK  Referring:  ERICKA LUJAN  ------------------------------------------------  Chest PA and lateral  CLINICAL INDICATION: Shortness of breath. Anemia.    COMPARISON: October 17, 2011.    FINDINGS: Cardiac silhouette is normal in size. Pulmonary  vascularity is unremarkable.     No focal infiltrate or consolidation.  No pleural effusion.  No  pneumothorax.  Subtle fibrotic changes in both apices.    Fluid or thickening of the major fissures seen on lateral view  posteriorly.  The lungs are otherwise clear.      Impression:       CONCLUSION: Subtle bilateral apical fibrotic changes. Fluid  and/or thickening of the major fissures seen on lateral  view.  Otherwise unremarkable chest.    Electronically signed by:  Jerrod Palacios MD  3/22/2017 2:54 PM CDT  Workstation: Walk-in Appointment [email or web address]          Assessment/Plan     Principal Problem:    GI bleed  Active Problems:    COPD (chronic obstructive pulmonary disease)    A-fib    Hypertension    Anemia    Expressive aphasia    History of stroke    Patient is s/p 1 unit pRBCs for symptomatic anemia with continued symptommatic anemia will give 2 more units; Dr. Dickson is to see the patient today for evaluation of source of bleed with additional plan to follow. Will hold coagulation in setting of potential active bleed and monitor patient for signs of continued bleeding.           This document has been electronically signed by Louisa Cottrell MD on March 23, 2017 9:36 AM

## 2017-03-23 NOTE — PLAN OF CARE
Problem: Patient Care Overview (Adult)  Goal: Plan of Care Review  Outcome: Ongoing (interventions implemented as appropriate)    03/23/17 0333   Coping/Psychosocial Response Interventions   Plan Of Care Reviewed With patient   Patient Care Overview   Progress declining   Outcome Evaluation   Outcome Summary/Follow up Plan Redraw H&H in AM       Goal: Adult Individualization and Mutuality  Outcome: Ongoing (interventions implemented as appropriate)    Problem: Gastrointestinal Bleeding (Adult)  Goal: Signs and Symptoms of Listed Potential Problems Will be Absent or Manageable (Gastrointestinal Bleeding)  Outcome: Ongoing (interventions implemented as appropriate)

## 2017-03-24 VITALS
DIASTOLIC BLOOD PRESSURE: 57 MMHG | WEIGHT: 155 LBS | RESPIRATION RATE: 18 BRPM | SYSTOLIC BLOOD PRESSURE: 124 MMHG | TEMPERATURE: 97.2 F | BODY MASS INDEX: 24.33 KG/M2 | HEIGHT: 67 IN | OXYGEN SATURATION: 97 % | HEART RATE: 67 BPM

## 2017-03-24 PROBLEM — K92.2 GI BLEED: Status: RESOLVED | Noted: 2017-03-22 | Resolved: 2017-03-24

## 2017-03-24 LAB
ABO + RH BLD: NORMAL
ABO + RH BLD: NORMAL
ANION GAP SERPL CALCULATED.3IONS-SCNC: 8 MMOL/L (ref 5–15)
BASOPHILS # BLD AUTO: 0.05 10*3/MM3 (ref 0–0.2)
BASOPHILS NFR BLD AUTO: 1.5 % (ref 0–2)
BH BB BLOOD EXPIRATION DATE: NORMAL
BH BB BLOOD EXPIRATION DATE: NORMAL
BH BB BLOOD TYPE BARCODE: 6200
BH BB BLOOD TYPE BARCODE: 6200
BH BB DISPENSE STATUS: NORMAL
BH BB DISPENSE STATUS: NORMAL
BH BB PRODUCT CODE: NORMAL
BH BB PRODUCT CODE: NORMAL
BH BB UNIT NUMBER: NORMAL
BH BB UNIT NUMBER: NORMAL
BUN BLD-MCNC: 12 MG/DL (ref 7–21)
BUN/CREAT SERPL: 13.6 (ref 7–25)
CALCIUM SPEC-SCNC: 8.8 MG/DL (ref 8.4–10.2)
CHLORIDE SERPL-SCNC: 99 MMOL/L (ref 95–110)
CO2 SERPL-SCNC: 34 MMOL/L (ref 22–31)
CREAT BLD-MCNC: 0.88 MG/DL (ref 0.5–1)
DEPRECATED RDW RBC AUTO: 46.7 FL (ref 36.4–46.3)
EOSINOPHIL # BLD AUTO: 0.18 10*3/MM3 (ref 0–0.7)
EOSINOPHIL NFR BLD AUTO: 5.3 % (ref 0–7)
ERYTHROCYTE [DISTWIDTH] IN BLOOD BY AUTOMATED COUNT: 14.2 % (ref 11.5–14.5)
GFR SERPL CREATININE-BSD FRML MDRD: 62 ML/MIN/1.73 (ref 60–90)
GLUCOSE BLD-MCNC: 87 MG/DL (ref 60–100)
HCT VFR BLD AUTO: 32.5 % (ref 35–45)
HGB BLD-MCNC: 10.9 G/DL (ref 12–15.5)
IMM GRANULOCYTES # BLD: 0 10*3/MM3 (ref 0–0.02)
IMM GRANULOCYTES NFR BLD: 0 % (ref 0–0.5)
LYMPHOCYTES # BLD AUTO: 1.41 10*3/MM3 (ref 0.6–4.2)
LYMPHOCYTES NFR BLD AUTO: 41.7 % (ref 10–50)
MCH RBC QN AUTO: 29.8 PG (ref 26.5–34)
MCHC RBC AUTO-ENTMCNC: 33.5 G/DL (ref 31.4–36)
MCV RBC AUTO: 88.8 FL (ref 80–98)
MONOCYTES # BLD AUTO: 0.71 10*3/MM3 (ref 0–0.9)
MONOCYTES NFR BLD AUTO: 21 % (ref 0–12)
NEUTROPHILS # BLD AUTO: 1.03 10*3/MM3 (ref 2–8.6)
NEUTROPHILS NFR BLD AUTO: 30.5 % (ref 37–80)
PLATELET # BLD AUTO: 190 10*3/MM3 (ref 150–450)
PMV BLD AUTO: 11 FL (ref 8–12)
POTASSIUM BLD-SCNC: 4.1 MMOL/L (ref 3.5–5.1)
RBC # BLD AUTO: 3.66 10*6/MM3 (ref 3.77–5.16)
SODIUM BLD-SCNC: 141 MMOL/L (ref 137–145)
UNIT  ABO: NORMAL
UNIT  ABO: NORMAL
UNIT  RH: NORMAL
UNIT  RH: NORMAL
WBC NRBC COR # BLD: 3.38 10*3/MM3 (ref 3.2–9.8)

## 2017-03-24 PROCEDURE — 80048 BASIC METABOLIC PNL TOTAL CA: CPT | Performed by: HOSPITALIST

## 2017-03-24 PROCEDURE — 85025 COMPLETE CBC W/AUTO DIFF WBC: CPT | Performed by: HOSPITALIST

## 2017-03-24 RX ORDER — PRASUGREL 5 MG/1
5 TABLET, FILM COATED ORAL DAILY
Qty: 30 TABLET | Refills: 0 | Status: SHIPPED | OUTPATIENT
Start: 2017-03-24 | End: 2017-03-24

## 2017-03-24 RX ORDER — PRASUGREL 5 MG/1
5 TABLET, FILM COATED ORAL DAILY
Qty: 30 TABLET | Refills: 0 | Status: SHIPPED | OUTPATIENT
Start: 2017-03-24 | End: 2017-04-05

## 2017-03-24 RX ADMIN — ATORVASTATIN CALCIUM 40 MG: 40 TABLET, FILM COATED ORAL at 10:26

## 2017-03-24 RX ADMIN — SOTALOL HYDROCHLORIDE 80 MG: 80 TABLET ORAL at 10:27

## 2017-03-24 RX ADMIN — ROPINIROLE HYDROCHLORIDE 0.5 MG: 0.5 TABLET, FILM COATED ORAL at 06:21

## 2017-03-24 RX ADMIN — FAMOTIDINE 40 MG: 40 TABLET ORAL at 10:27

## 2017-03-24 RX ADMIN — PANTOPRAZOLE SODIUM 40 MG: 40 TABLET, DELAYED RELEASE ORAL at 10:27

## 2017-03-24 RX ADMIN — GABAPENTIN 100 MG: 100 CAPSULE ORAL at 10:27

## 2017-03-24 NOTE — NURSING NOTE
Pt is confused and very agitated this am. Pt believes she has been here 3 weeks. She is refusing to take am meds until she speaks with a doctor. Attempted to reorient pt. Pt is adamant she has been in the hospital for at least 2 weeks without food. She has been here for two days and is being given a liquid diet.

## 2017-03-24 NOTE — CONSULTS
Ramiro Hart DO,[facility]  Gastroenterology  Hepatology  Endoscopy  Board Certified in Internal Medicine and gastroenterology  [address] [facility], [address]  [place]. [address]  - [phone]   F  [phone]     GASTROENTEROLOGY CONSULT NOTE   RAMIRO HART DO.         SUBJECTIVE:   3/23/2017    Name: Sarah Layton  DOD: 1940    REASON FOR CONSULT: Anemia with occult blood within the stools    Chief Complaint:     Chief Complaint   Patient presents with   • Low Hgb       Subjective  :weakness and fatigue with associated occult blood within the stools and anemia    Patient is 76 y.o. 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  This will be the patient's fourth episode of symptomatic occult blood bleeding that has been occurring for the past 10 years.  The patient had 2 or 3 episodes that occurred while in [place].  The patient had a colonoscopy at that time that showed adenomas.  The patient 5 years ago had a similar presentation that began after the patient was placed on antiplatelets or anticoagulants.  The patient was found at that time to have duodenal vascular malformations as well as right colon and cecal vascular malformations.  The patient had thermal therapy that was performed.  The patient has been completely asymptomatic since that time.    Approximately 1 month ago, the patient had a cerebrovascular accident that left the patient with expressive aphasia.  The patient was started on Coumadin.  The cause of difficulties in maintaining normal anticoagulant levels and obtaining the necessary INR, the patient was switched to Xerolto.    The patient only takes 1 Xerolto at nighttime..  The patient has had no evidence of any ongoing GI bleeding that has been evident.  However, the patient was found to have extreme anemia.    I spoke with Dr. Everett.  He recollects that the  patient's hemoglobin was 10 when admitted to the hospital in [place]. No evaluation was done at that time.  The patient was seen in her neurologist office because of extreme fatigue and weakness.  The patient had lab drawn and it was 7.  The patient was directed to the emergency room where the patient was found to have a hemoglobin of 6.4.  The patient has had no visible signs of bleeding.    The patient has been admitted to the hospital.  The patient has had no evidence of any active ongoing GI bleeding.  The patient has had transfusion of blood.     ROS/HISTORY/ CURRENT MEDICATIONS/OBJECTIVE/VS/PE:   Review of Systems:   Review of Systems   Constitutional: Positive for activity change, appetite change and fatigue. Negative for unexpected weight change.   Eyes: Negative.    Respiratory: Negative.    Cardiovascular: Positive for palpitations. Negative for chest pain and leg swelling.   Gastrointestinal: Negative.    Endocrine: Positive for cold intolerance and heat intolerance.   Genitourinary: Negative.    Musculoskeletal: Positive for arthralgias, back pain and myalgias.   Skin: Negative.    Neurological: Positive for dizziness, speech difficulty, weakness, light-headedness, numbness and headaches. Negative for tremors, seizures, syncope and facial asymmetry.   Hematological: Bruises/bleeds easily.   Psychiatric/Behavioral: The patient is nervous/anxious.        History:     Past Medical History:   Diagnosis Date   • A-fib    • Anemia    • COPD (chronic obstructive pulmonary disease)    • Emphysema of lung    • Hypertension    • Stroke      Past Surgical History:   Procedure Laterality Date   • APPENDECTOMY     • BRAIN SURGERY      anuyrism   • CHOLECYSTECTOMY     • HYSTERECTOMY       History reviewed. No pertinent family history.  Social History   Substance Use Topics   • Smoking status: Current Every Day Smoker     Packs/day: 1.00     Years: 60.00     Types: Cigarettes   • Smokeless tobacco: None   • Alcohol  use No     Prescriptions Prior to Admission   Medication Sig Dispense Refill Last Dose   • aspirin 81 MG chewable tablet Chew 81 mg Daily.      • famotidine (PEPCID) 40 MG tablet Take 40 mg by mouth Daily.   3/21/2017 at Unknown time   • furosemide (LASIX) 20 MG tablet Take 20 mg by mouth 3 (Three) Times a Week. Monday, Wednesday, Friday   3/20/2017   • gabapentin (NEURONTIN) 100 MG capsule Take 100 mg by mouth 3 (Three) Times a Day.   3/21/2017 at Unknown time   • HYDROcodone-acetaminophen (NORCO)  MG per tablet Take 1 tablet by mouth 2 (Two) Times a Day.   3/21/2017 at Unknown time   • lisinopril (PRINIVIL,ZESTRIL) 10 MG tablet Take 10 mg by mouth Daily.   3/21/2017 at Unknown time   • Nutritional Supplements (ENSURE ACTIVE) liquid Take  by mouth.      • pantoprazole (PROTONIX) 40 MG EC tablet Take 40 mg by mouth Daily.   3/21/2017 at Unknown time   • rivaroxaban (XARELTO) 20 MG tablet Take 20 mg by mouth Every Evening.   3/21/2017 at Unknown time   • rOPINIRole (REQUIP) 0.5 MG tablet Take 0.5 mg by mouth Every Morning.   3/21/2017 at Unknown time   • rOPINIRole (REQUIP) 0.5 MG tablet Take 1 mg by mouth Every Night. Take 1 hour before bedtime.   3/21/2017 at Unknown time   • sotalol (BETAPACE) 80 MG tablet Take 80 mg by mouth 2 (Two) Times a Day.   3/21/2017 at Unknown time   • traZODone (DESYREL) 50 MG tablet Take 50 mg by mouth Every Night.   3/21/2017 at Unknown time   • atorvastatin (LIPITOR) 40 MG tablet Take 40 mg by mouth.        Allergies:  Iodides and Penicillins    I have reviewed the patients medical history, surgical history and family history in the available medical record system.     Current Medications:     Current Facility-Administered Medications   Medication Dose Route Frequency Provider Last Rate Last Dose   • atorvastatin (LIPITOR) tablet 40 mg  40 mg Oral Daily Ramírez Dunaway MD   40 mg at 03/23/17 0814   • famotidine (PEPCID) tablet 40 mg  40 mg Oral Daily Ramírez Dunaway MD   40  mg at 03/23/17 0814   • furosemide (LASIX) injection 20 mg  20 mg Intravenous PRN Louisa Cottrell MD       • furosemide (LASIX) tablet 20 mg  20 mg Oral Once per day on Mon Wed Fri Ramírez Dunaway MD       • gabapentin (NEURONTIN) capsule 100 mg  100 mg Oral TID Ramírez Dunaway MD   100 mg at 03/23/17 1722   • HYDROcodone-acetaminophen (NORCO)  MG per tablet 1 tablet  1 tablet Oral BID Ramírez Dunaway MD   1 tablet at 03/23/17 1722   • lisinopril (PRINIVIL,ZESTRIL) tablet 10 mg  10 mg Oral Daily Ramírez Dunaway MD   10 mg at 03/23/17 0819   • pantoprazole (PROTONIX) EC tablet 40 mg  40 mg Oral Daily Ramírez Dunaway MD   40 mg at 03/23/17 0817   • rOPINIRole (REQUIP) tablet 0.5 mg  0.5 mg Oral QAM Ramírez Dunaway MD   0.5 mg at 03/23/17 0644   • rOPINIRole (REQUIP) tablet 1 mg  1 mg Oral Nightly Ramírez Dunaway MD   1 mg at 03/22/17 2200   • sodium chloride 0.9 % flush 1-10 mL  1-10 mL Intravenous PRN Ramírez Dunaway MD       • sodium chloride 0.9 % flush 10 mL  10 mL Intravenous PRN Haim Motley MD       • sotalol (BETAPACE) tablet 80 mg  80 mg Oral BID Ramírez Dunaway MD   80 mg at 03/23/17 1722   • traZODone (DESYREL) tablet 50 mg  50 mg Oral Nightly Ramírez Dunaway MD   50 mg at 03/22/17 2202       Objective     Physical Exam:   Temp:  [96.2 °F (35.7 °C)-98.6 °F (37 °C)] 96.9 °F (36.1 °C)  Heart Rate:  [56-75] 60  Resp:  [16-18] 18  BP: ()/(48-64) 120/58    Physical Exam:  General Appearance:    Alert, cooperative, in no acute distress   Head:    Normocephalic, without obvious abnormality, atraumatic   Eyes:            Lids and lashes normal, conjunctivae and sclerae normal, no   icterus, no pallor, corneas clear, PERRLA   Ears:    Ears appear intact with no abnormalities noted   Throat:   No oral lesions, no thrush, oral mucosa moist   Neck:   No adenopathy, supple, trachea midline, no thyromegaly, no     carotid bruit, no JVD   Back:     No kyphosis present, no  scoliosis present, no skin lesions,       erythema or scars, no tenderness to percussion or                   palpation,   range of motion normal   Lungs:     Clear to auscultation,respirations regular, even and                   unlabored    Heart:    Irregular rhythm and normal rate, normal S1 and S2, no            murmur, no gallop, no rub, no click   Breast Exam:    Deferred   Abdomen:     Normal bowel sounds, no masses, no organomegaly, soft        non-tender, non-distended, no guarding, no rebound                 tenderness   Genitalia:    Deferred   Extremities:   Moves all extremities well, no edema, no cyanosis, no              redness   Pulses:   Pulses palpable and equal bilaterally   Skin:   No bleeding, bruising or rash   Lymph nodes:   No palpable adenopathy   Neurologic:   Cranial nerves 2 - 12 grossly intact, sensation intact, DTR        present and equal bilaterally      Results Review:     Lab Results   Component Value Date    WBC 3.23 03/23/2017    WBC 4.12 03/22/2017    WBC 4.54 03/22/2017    HGB 10.4 (L) 03/23/2017    HGB 7.6 (L) 03/23/2017    HGB 7.2 (L) 03/22/2017    HCT 30.4 (L) 03/23/2017    HCT 22.4 (L) 03/23/2017    HCT 21.5 (L) 03/22/2017    HCT 21.5 (L) 03/22/2017     03/23/2017     03/22/2017     03/22/2017       Results from last 7 days  Lab Units 03/22/17  1326   ALK PHOS U/L 53   ALT (SGPT) U/L 29   AST (SGOT) U/L 28       Results from last 7 days  Lab Units 03/22/17  1326   BILIRUBIN mg/dL 0.3   ALK PHOS U/L 53     No results found for: LIPASE  Lab Results   Component Value Date    INR 1.06 03/22/2017    INR 1.01 03/22/2017          Radiology Review:  Imaging Results (last 72 hours)     Procedure Component Value Units Date/Time    XR Chest 2 View [[id number]] Collected:  03/22/17 1452     Updated:  03/22/17 1455    Narrative:       Patient Name:  SANNA ZHANG  Patient ID:  [id number]   Ordering:  ERICKA LUJAN  Attending:  IRENE POLK  Referring:   ERICKA LUJAN  ------------------------------------------------  Chest PA and lateral  CLINICAL INDICATION: Shortness of breath. Anemia.    COMPARISON: October 17, 2011.    FINDINGS: Cardiac silhouette is normal in size. Pulmonary  vascularity is unremarkable.     No focal infiltrate or consolidation.  No pleural effusion.  No  pneumothorax.  Subtle fibrotic changes in both apices.    Fluid or thickening of the major fissures seen on lateral view  posteriorly.  The lungs are otherwise clear.      Impression:       CONCLUSION: Subtle bilateral apical fibrotic changes. Fluid  and/or thickening of the major fissures seen on lateral view.  Otherwise unremarkable chest.    Electronically signed by:  Jerrod Palacios MD  3/22/2017 2:54 PM CDT  Workstation: [id number]           I reviewed the patient's new clinical results.  I reviewed the patient's new imaging results and agree with the interpretation.     ASSESSMENT/PLAN:   ASSESSMENT:   1.  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  2.  Occult blood within the stools  3.  Cerebrovascular accident, embolic.  Affecting the patient's speech.  Secondary to atrial fibrillation  4.  Atrial fibrillation  5.  Personal history of colon polyps    PLAN:   1.  At this time, I think that we should not proceed on with endoscopy.  Endoscopy carries with it some risk in this patient who has had a recent stroke.  This is especially in light of the fact that we know that this is a recurring problem for the patient.  2.  I spoke with Dr. Everett.  He agrees with my impression.  3.  I spoke with the daughter, Keli.  She understands our concern.  I told her that if the patient continues to have gastrointestinal bleeding that is occult after 1 month, we may need to consider a endoscopy.  4.  Iron therapy, lifelong  5.  I spoke with Dr. Everett and it may be worthwhile to change the patient from Xerolto   To [place] area and I would use a low doses of this.  6.  CBC weekly by home health  7.  Office with me in 1 month to determine whether endoscopy is worthwhile or required.       Bhaskar Dickson DO  03/23/17  7:46 PM

## 2017-03-24 NOTE — DISCHARGE PLACEMENT REQUEST
"Sarah Layton (76 y.o. Female)     Date of Birth Social Security Number Address Home Phone MRN    [id number]  [address] [place] [address] [phone] [id number]    Orthodox Marital Status          Buddhism        Admission Date Admission Type Admitting Provider Attending Provider Department, Room/Bed    3/22/17 Emergency Александр Cruz MD Shahidi, Paul Cyrus, MD [address], 358/1    Discharge Date Discharge Disposition Discharge Destination         Home or Self Care             Attending Provider: Александр Cruz MD     Allergies:  Iodides, Penicillins    Isolation:  None   Infection:  None   Code Status:  FULL    Ht:  67\" (170.2 cm)   Wt:  155 lb (70.3 kg)    Admission Cmt:  None   Principal Problem:  GI bleed [K92.2]                 Active Insurance as of 3/22/2017     Primary Coverage     Payor Plan Insurance Group Employer/Plan Group    AETNA MEDICARE REPLACEMENT AETNA [id number]     Payor Plan Address Payor Plan Phone Number Effective From Effective To    [address] [phone] 1/1/2017     [place] [address]       Subscriber Name Subscriber Birth Date Member ID       SARAH LAYTON 1940 [id number]                 Emergency Contacts      (Rel.) Home Phone Work Phone Mobile Phone    Keli Hilario (Daughter) [phone] -- --               History & Physical      Ramírez Dunaway MD at 3/22/2017  6:56 PM                [facility] Medicine Admission      Date of Admission: 3/22/2017      Primary Care Physician: Charles Everett MD      Chief Complaint: Weakness, dizziness, anemia    HPI:  The patient is a 76-year-old female who presented with complaints of weakness and dizziness.  Patient has expressive aphasia from her recent stroke so the majority of her history was obtained from her daughter.  The patient's daughter states that she noticed weakness and fatigue worsening on " approximately the Thursday prior to admission.  On the day of admission he had a post stroke follow-up with the neurologist in [place].  She was found to have a blood pressure in the 80s systolic, and was dizzy with standing.  The neurologist's office did blood work and then discharged her to go to the [facility] urgent care.  While they were on their way to the urgent care, the office called and stated that the patient's hemoglobin was 7.5 and needed to go to the emergency department.  Neither the patient nor her daughter has noticed any melena or hematochezia.  She was started on Coumadin after her stroke on February 22.  She was transitioned as a result oh on March 9.  Of note she has been feeling more short of breath, and has been using her oxygen more lately     Concurrent Medical History:  has a past medical history of A-fib; Anemia; COPD (chronic obstructive pulmonary disease); Emphysema of lung; Hypertension; and Stroke.    Past Surgical History:  has a past surgical history that includes Cholecystectomy; Hysterectomy; Appendectomy; and Brain surgery.    Family History: family history is not on file.    Social History:  reports that she has been smoking Cigarettes.  She has a 60.00 pack-year smoking history. She does not have any smokeless tobacco history on file. She reports that she does not drink alcohol or use illicit drugs.    Allergies:   Allergies   Allergen Reactions   • Penicillins        Medications: Scheduled Meds:   Continuous Infusions:   PRN Meds:.•  sodium chloride    Review of Systems:  Review of Systems   Reason unable to perform ROS: expressive aphasia.      Physical Exam:   Temp:  [97.7 °F (36.5 °C)-98.8 °F (37.1 °C)] 97.8 °F (36.6 °C)  Heart Rate:  [59-70] 62  Resp:  [15-18] 16  BP: ()/(45-65) 112/53     Physical Exam   Constitutional: She is oriented to person, place, and time. She appears well-developed and well-nourished.   HENT:   Head: Normocephalic and atraumatic.   Nose:  Nose normal.   Mouth/Throat: Oropharynx is clear and moist.   Eyes: Conjunctivae, EOM and lids are normal. Pupils are equal, round, and reactive to light. No scleral icterus.   Neck: Normal range of motion. Neck supple. No JVD present. No tracheal tenderness and no spinous process tenderness present. No rigidity. No tracheal deviation, no edema and normal range of motion present.   Cardiovascular: Normal rate, regular rhythm, S1 normal, S2 normal, normal heart sounds and normal pulses.  Exam reveals no gallop and no friction rub.    No murmur heard.  Pulmonary/Chest: Effort normal and breath sounds normal. No accessory muscle usage. No respiratory distress. She has no decreased breath sounds. She has no wheezes. She has no rales. She exhibits no tenderness.   Abdominal: Soft. Bowel sounds are normal. She exhibits no distension and no mass. There is no tenderness. There is no rebound and no guarding.   Musculoskeletal: She exhibits no edema or tenderness.        Right shoulder: She exhibits normal range of motion, no tenderness and no pain.   Neurological: She is alert and oriented to person, place, and time. She has normal reflexes. She displays no atrophy and normal reflexes. No cranial nerve deficit or sensory deficit. She exhibits normal muscle tone. She displays no seizure activity. Coordination normal.   Expressive aphasia   Skin: Skin is warm. No rash noted. No pallor.   Psychiatric: She has a normal mood and affect. Her behavior is normal. Judgment and thought content normal.         Results Reviewed:  I have personally reviewed current lab, radiology, and data and agree with results.  Lab Results (last 24 hours)     Procedure Component Value Units Date/Time    Comprehensive Metabolic Panel [[id number]]  (Abnormal) Collected:  03/22/17 1326    Specimen:  Blood Updated:  03/22/17 1354     Glucose 99 mg/dL      BUN 23 (H) mg/dL      Creatinine 0.92 mg/dL      Sodium 136 (L) mmol/L      Potassium 4.1 mmol/L       Chloride 96 mmol/L      CO2 31.0 mmol/L      Calcium 8.9 mg/dL      Total Protein 6.0 (L) g/dL      Albumin 3.60 g/dL      ALT (SGPT) 29 U/L      AST (SGOT) 28 U/L      Alkaline Phosphatase 53 U/L      Total Bilirubin 0.3 mg/dL      eGFR Non African Amer 59 mL/min/1.73      Globulin 2.4 gm/dL      A/G Ratio 1.5 g/dL      BUN/Creatinine Ratio 25.0     Anion Gap 9.0 mmol/L     Narrative:       The MDRD GFR formula is only valid for adults with stable renal function between ages 18 and 70.    CBC Auto Differential [[id number]]  (Abnormal) Collected:  03/22/17 1326    Specimen:  Blood Updated:  03/22/17 1403     WBC 4.54 10*3/mm3      RBC 2.11 (L) 10*6/mm3      Hemoglobin 6.3 (C) g/dL      Hematocrit 19.3 (L) %      MCV 91.5 fL      MCH 29.9 pg      MCHC 32.6 g/dL      RDW 13.6 %      RDW-SD 46.0 fl      MPV 10.8 fL      Platelets 242 10*3/mm3     Protime-INR [[id number]]  (Normal) Collected:  03/22/17 1326    Specimen:  Blood Updated:  03/22/17 1448     Protime 13.3 Seconds      INR 1.01    Narrative:       Therapeutic range for most indications is 2.0-3.0 INR,  or 2.5-3.5 for mechanical heart valves.    CBC & Differential [[id number]] Collected:  03/22/17 1326    Specimen:  Blood Updated:  03/22/17 1513    Narrative:       The following orders were created for panel order CBC & Differential.  Procedure                               Abnormality         Status                     ---------                               -----------         ------                     Manual Differential[[id number]]           Abnormal            Final result               CBC Auto Differential[[id number]]         Abnormal            Final result                 Please view results for these tests on the individual orders.    Manual Differential [[id number]]  (Abnormal) Collected:  03/22/17 1326    Specimen:  Blood Updated:  03/22/17 1513     Neutrophil % 37.0 %      Lymphocyte % 40.0 %      Monocyte % 18.0 (H) %      Eosinophil % 5.0 %       Neutrophils Absolute 1.68 (L) 10*3/mm3      Lymphocytes Absolute 1.82 10*3/mm3      Monocytes Absolute 0.82 10*3/mm3      Eosinophils Absolute 0.23 10*3/mm3      Polychromasia Slight/1+     WBC Morphology Normal     Platelet Morphology Normal    [place] Draw [[id number]] Collected:  03/22/17 1326    Specimen:  Blood Updated:  03/22/17 1801    Narrative:       The following orders were created for panel order [place] Draw.  Procedure                               Abnormality         Status                     ---------                               -----------         ------                     Light Blue Top[[id number]]                                    Final result               Green Top (Gel)[[id number]]                                   Final result               Lavender Top[[id number]]                                      Final result               Gold Top - SST[[id number]]                                    Final result                 Please view results for these tests on the individual orders.    Light Blue Top [[id number]] Collected:  03/22/17 1326    Specimen:  Blood Updated:  03/22/17 1801     Extra Tube hold for add-on      Auto resulted       Green Top (Gel) [[id number]] Collected:  03/22/17 1326    Specimen:  Blood Updated:  03/22/17 1801     Extra Tube Hold for add-ons.      Auto resulted.       Lavender Top [[id number]] Collected:  03/22/17 1326    Specimen:  Blood Updated:  03/22/17 1801     Extra Tube hold for add-on      Auto resulted       Gold Top - SST [[id number]] Collected:  03/22/17 1326    Specimen:  Blood Updated:  03/22/17 1801     Extra Tube Hold for add-ons.      Auto resulted.           Imaging Results (last 24 hours)     Procedure Component Value Units Date/Time    XR Chest 2 View [[id number]] Collected:  03/22/17 1452     Updated:  03/22/17 1455    Narrative:       Patient Name:  SANNA ZHANG  Patient ID:  [id number]   Ordering:  ERICKA LUJAN  Attending:  IRENE POLK  Referring:   ERICKA AQUINO LUJAN  ------------------------------------------------  Chest PA and lateral  CLINICAL INDICATION: Shortness of breath. Anemia.    COMPARISON: October 17, 2011.    FINDINGS: Cardiac silhouette is normal in size. Pulmonary  vascularity is unremarkable.     No focal infiltrate or consolidation.  No pleural effusion.  No  pneumothorax.  Subtle fibrotic changes in both apices.    Fluid or thickening of the major fissures seen on lateral view  posteriorly.  The lungs are otherwise clear.      Impression:       CONCLUSION: Subtle bilateral apical fibrotic changes. Fluid  and/or thickening of the major fissures seen on lateral view.  Otherwise unremarkable chest.    Electronically signed by:  Jerrod Palacios MD  3/22/2017 2:54 PM CDT  Workstation: [id number]            Assessment:    Hospital Problem List     GI bleed                Plan:  1.  Symptomatic Anemia:  Patient with a hemoglobin of 6.3.  Complains of weakness, fatigue, dizziness that is worse with standing, and hypotension.   Hemoglobin on March 7 was 12.9.  Patient was started on anticoagulation as a result of a stroke February 22.  Patient is currently getting the first of 2 units of packed red blood cells.  We'll recheck hemoglobin after that.  Dr. Dickson was contacted from the emergency department and has graciously agreed to see the patient in consultation.  Will follow serial hemoglobin.  2.  Recent cerebrovascular accident:  On February 22, patient has been left with expressive aphasia.  Was felt to be secondary to an embolic event from her atrial fibrillation.  3.  Atrial fibrillation: Has been on anticoagulation, and sotalol.  4.  COPD: No evidence of exacerbation.  5.  Tobacco abuse: Patient is currently not amenable to cessation counseling.  6.  Hypertension  7.  Hyperlipidemia  8.  DVT prophylaxis: Will use SCDs and GEN hose.  Pharmacologic anticoagulation not advised to bleeding.  9.  GI prophylaxis: Protonix.      I discussed the patients  findings and my recommendations with: Patient and family.    Ramírez Dunaway MD  03/22/17  6:56 PM                     Electronically signed by Ramírez Dunaway MD at 3/22/2017  7:20 PM           Physician Progress Notes (last 72 hours) (Notes from 3/21/2017 11:16 AM through 3/24/2017 11:16 AM)      Louisa Cottrell MD at 3/23/2017  9:36 AM  Version 2 of 2    Attestation signed by Александр Cruz MD at 3/23/2017  3:20 PM        I saw and evaluated the patient with Dr. Cottrell.  I discussed the case with the resident and agree with the findings and plan as documented in the residents note.  Exceptions/Additions include:    Physical Exam:  General: AOX3  CV: RRR  Lung: CTABL  Abdomen: BS(+). Soft, mild lower abdominal tenderness present   Extremities: No edema, cyanosis, clubbing.       Александр Cruz MD   03/23/17   3:19 PM                                   Progress Note  Louisa Cottrell MD  Family Medicine Resident, PGY 1     LOS: 1 day   Patient Care Team:  Charles Everett MD as PCP - General    Chief Complaint: dizziness, weakness, anemia    Subjective     Interval History:     Patient reports that she is feeling better, no issues at bedside with communication: patient has history of aphasia likely exacerbated by symptomatic anemia. Daughter at bedside was there to assist with communication.    History taken from: chart, patient, family    Medication Review:   Current Facility-Administered Medications   Medication Dose Route Frequency Provider Last Rate Last Dose   • atorvastatin (LIPITOR) tablet 40 mg  40 mg Oral Daily Ramírez Dunaway MD   40 mg at 03/23/17 0814   • famotidine (PEPCID) tablet 40 mg  40 mg Oral Daily Ramírez Dunaway MD   40 mg at 03/23/17 0814   • furosemide (LASIX) tablet 20 mg  20 mg Oral Once per day on Mon Wed Fri Ramírez Dunaway MD       • gabapentin (NEURONTIN) capsule 100 mg  100 mg Oral TID Ramírez Dunaway MD   100 mg at 03/23/17 0817   •  HYDROcodone-acetaminophen (NORCO)  MG per tablet 1 tablet  1 tablet Oral BID Ramírez Dunaway MD   1 tablet at 03/23/17 0813   • lisinopril (PRINIVIL,ZESTRIL) tablet 10 mg  10 mg Oral Daily Ramírez Dunaway MD   10 mg at 03/23/17 0819   • pantoprazole (PROTONIX) EC tablet 40 mg  40 mg Oral Daily Ramírez Dunaway MD   40 mg at 03/23/17 0817   • rOPINIRole (REQUIP) tablet 0.5 mg  0.5 mg Oral QAM Ramírez Dunaway MD   0.5 mg at 03/23/17 0644   • rOPINIRole (REQUIP) tablet 1 mg  1 mg Oral Nightly Ramírez Dunaway MD   1 mg at 03/22/17 2200   • sodium chloride 0.9 % flush 1-10 mL  1-10 mL Intravenous PRN Ramírez Dunaway MD       • sodium chloride 0.9 % flush 10 mL  10 mL Intravenous PRN Haim Motley MD       • sotalol (BETAPACE) tablet 80 mg  80 mg Oral BID Ramírez Dunaway MD   80 mg at 03/23/17 0819   • traZODone (DESYREL) tablet 50 mg  50 mg Oral Nightly Ramírez Dunaway MD   50 mg at 03/22/17 2202       Review of Systems:   Review of Systems   Constitutional: Negative.  Negative for fatigue and fever.   HENT: Negative.  Negative for ear pain and sore throat.    Eyes: Negative.  Negative for pain and visual disturbance.   Respiratory: Negative.  Negative for cough and shortness of breath.    Cardiovascular: Negative.  Negative for chest pain and palpitations.   Gastrointestinal: Negative for abdominal pain and nausea.   Endocrine: Negative.    Genitourinary: Negative for dysuria.   Musculoskeletal: Negative for arthralgias.   Skin: Negative for rash.   Allergic/Immunologic: Negative.    Neurological: Negative for dizziness, weakness and headaches.   Psychiatric/Behavioral: Negative for sleep disturbance.       Objective     Vital Signs  Temp:  [96.9 °F (36.1 °C)-98.8 °F (37.1 °C)] 97.7 °F (36.5 °C)  Heart Rate:  [59-75] 64  Resp:  [15-18] 18  BP: ()/(45-65) 104/50    Physical Exam:  Physical Exam   Constitutional: She appears well-developed and well-nourished.   HENT:   Head: Normocephalic  and atraumatic.   Eyes: EOM are normal. Pupils are equal, round, and reactive to light.   Neck: Normal range of motion.   Cardiovascular: Normal rate, regular rhythm and normal heart sounds.  Exam reveals no gallop and no friction rub.    No murmur heard.  Pulmonary/Chest: Effort normal and breath sounds normal. No respiratory distress. She has no wheezes. She has no rales. She exhibits no tenderness.   Abdominal: Soft. Bowel sounds are normal. She exhibits no distension and no mass. There is tenderness. There is no rebound and no guarding. No hernia.   Musculoskeletal: Normal range of motion.   Neurological: She is alert.   Patient has hx aphasia   Skin: Skin is warm and dry. There is pallor.   Psychiatric: She has a normal mood and affect. Her behavior is normal. Judgment and thought content normal.        Results Review:    Lab Results (last 24 hours)     Procedure Component Value Units Date/Time    Comprehensive Metabolic Panel [[id number]]  (Abnormal) Collected:  03/22/17 1326    Specimen:  Blood Updated:  03/22/17 1354     Glucose 99 mg/dL      BUN 23 (H) mg/dL      Creatinine 0.92 mg/dL      Sodium 136 (L) mmol/L      Potassium 4.1 mmol/L      Chloride 96 mmol/L      CO2 31.0 mmol/L      Calcium 8.9 mg/dL      Total Protein 6.0 (L) g/dL      Albumin 3.60 g/dL      ALT (SGPT) 29 U/L      AST (SGOT) 28 U/L      Alkaline Phosphatase 53 U/L      Total Bilirubin 0.3 mg/dL      eGFR Non African Amer 59 mL/min/1.73      Globulin 2.4 gm/dL      A/G Ratio 1.5 g/dL      BUN/Creatinine Ratio 25.0     Anion Gap 9.0 mmol/L     Narrative:       The MDRD GFR formula is only valid for adults with stable renal function between ages 18 and 70.    CBC Auto Differential [[id number]]  (Abnormal) Collected:  03/22/17 1326    Specimen:  Blood Updated:  03/22/17 1403     WBC 4.54 10*3/mm3      RBC 2.11 (L) 10*6/mm3      Hemoglobin 6.3 (C) g/dL      Hematocrit 19.3 (L) %      MCV 91.5 fL      MCH 29.9 pg      MCHC 32.6 g/dL      RDW 13.6  %      RDW-SD 46.0 fl      MPV 10.8 fL      Platelets 242 10*3/mm3     Protime-INR [[id number]]  (Normal) Collected:  03/22/17 1326    Specimen:  Blood Updated:  03/22/17 1448     Protime 13.3 Seconds      INR 1.01    Narrative:       Therapeutic range for most indications is 2.0-3.0 INR,  or 2.5-3.5 for mechanical heart valves.    CBC & Differential [[id number]] Collected:  03/22/17 1326    Specimen:  Blood Updated:  03/22/17 1513    Narrative:       The following orders were created for panel order CBC & Differential.  Procedure                               Abnormality         Status                     ---------                               -----------         ------                     Manual Differential[[id number]]           Abnormal            Final result               CBC Auto Differential[[id number]]         Abnormal            Final result                 Please view results for these tests on the individual orders.    Manual Differential [[id number]]  (Abnormal) Collected:  03/22/17 1326    Specimen:  Blood Updated:  03/22/17 1513     Neutrophil % 37.0 %      Lymphocyte % 40.0 %      Monocyte % 18.0 (H) %      Eosinophil % 5.0 %      Neutrophils Absolute 1.68 (L) 10*3/mm3      Lymphocytes Absolute 1.82 10*3/mm3      Monocytes Absolute 0.82 10*3/mm3      Eosinophils Absolute 0.23 10*3/mm3      Polychromasia Slight/1+     WBC Morphology Normal     Platelet Morphology Normal    [place] Draw [[id number]] Collected:  03/22/17 1326    Specimen:  Blood Updated:  03/22/17 1801    Narrative:       The following orders were created for panel order [place] Draw.  Procedure                               Abnormality         Status                     ---------                               -----------         ------                     Light Blue Top[[id number]]                                    Final result               Green Top (Gel)[[id number]]                                   Final result               Lavender  Top[[id number]]                                      Final result               Gold Top - SST[[id number]]                                    Final result                 Please view results for these tests on the individual orders.    Light Blue Top [[id number]] Collected:  03/22/17 1326    Specimen:  Blood Updated:  03/22/17 1801     Extra Tube hold for add-on      Auto resulted       Green Top (Gel) [[id number]] Collected:  03/22/17 1326    Specimen:  Blood Updated:  03/22/17 1801     Extra Tube Hold for add-ons.      Auto resulted.       Lavender Top [[id number]] Collected:  03/22/17 1326    Specimen:  Blood Updated:  03/22/17 1801     Extra Tube hold for add-on      Auto resulted       Gold Top - SST [[id number]] Collected:  03/22/17 1326    Specimen:  Blood Updated:  03/22/17 1801     Extra Tube Hold for add-ons.      Auto resulted.       Reticulocytes [[id number]]  (Abnormal) Collected:  03/22/17 2151    Specimen:  Blood Updated:  03/22/17 2200     Reticulocyte % 3.07 (H) %      Reticulocyte Absolute 0.0725 10*6/mm3      Immature Reticulocyte Fraction 15.5 %      Reticulocyte Production Index 0.67 %      Hematocrit 21.5 (L) %      Reticulated Hgb 25.7 (L) pg     CBC & Differential [[id number]] Collected:  03/22/17 2151    Specimen:  Blood Updated:  03/22/17 2207    Narrative:       The following orders were created for panel order CBC & Differential.  Procedure                               Abnormality         Status                     ---------                               -----------         ------                     Scan Slide[[id number]]                                                                   CBC Auto Differential[[id number]]         Abnormal            Final result                 Please view results for these tests on the individual orders.    CBC Auto Differential [[id number]]  (Abnormal) Collected:  03/22/17 2151    Specimen:  Blood Updated:  03/22/17 2207     WBC 4.12 10*3/mm3      RBC 2.36 (L) 10*6/mm3       Hemoglobin 7.2 (L) g/dL      Hematocrit 21.5 (L) %      MCV 91.1 fL      MCH 30.5 pg      MCHC 33.5 g/dL      RDW 13.9 %      RDW-SD 46.1 fl      MPV 10.0 fL      Platelets 171 10*3/mm3      Neutrophil % 39.6 %      Lymphocyte % 32.8 %      Monocyte % 22.8 (H) %      Eosinophil % 3.6 %      Basophil % 1.2 %      Immature Grans % 0.0 %      Neutrophils, Absolute 1.63 (L) 10*3/mm3      Lymphocytes, Absolute 1.35 10*3/mm3      Monocytes, Absolute 0.94 (H) 10*3/mm3      Eosinophils, Absolute 0.15 10*3/mm3      Basophils, Absolute 0.05 10*3/mm3      Immature Grans, Absolute 0.00 10*3/mm3     Basic Metabolic Panel [[id number]]  (Abnormal) Collected:  03/22/17 2151    Specimen:  Blood Updated:  03/22/17 2217     Glucose 92 mg/dL      BUN 19 mg/dL      Creatinine 0.87 mg/dL      Sodium 138 mmol/L      Potassium 4.2 mmol/L      Chloride 98 mmol/L      CO2 34.0 (H) mmol/L      Calcium 8.6 mg/dL      eGFR Non African Amer 63 mL/min/1.73      BUN/Creatinine Ratio 21.8     Anion Gap 6.0 mmol/L     Narrative:       The MDRD GFR formula is only valid for adults with stable renal function between ages 18 and 70.    aPTT [[id number]]  (Normal) Collected:  03/22/17 2151    Specimen:  Blood Updated:  03/22/17 2218     PTT 24.0 seconds     Narrative:       The recommended Heparin therapeutic range is 68-97 seconds.    Protime-INR [[id number]]  (Normal) Collected:  03/22/17 2151    Specimen:  Blood Updated:  03/22/17 2218     Protime 13.8 Seconds      INR 1.06    Narrative:       Therapeutic range for most indications is 2.0-3.0 INR,  or 2.5-3.5 for mechanical heart valves.    Iron Profile [[id number]]  (Abnormal) Collected:  03/22/17 2151    Specimen:  Blood Updated:  03/22/17 2225     Iron 32 (L) mcg/dL      TIBC 319 mcg/dL      Iron Saturation 10 (L) %     CBC Auto Differential [[id number]]  (Abnormal) Collected:  03/23/17 0535    Specimen:  Blood Updated:  03/23/17 0643     WBC 3.23 10*3/mm3      RBC 2.48 (L) 10*6/mm3      Hemoglobin 7.6 (L)  g/dL      Hematocrit 22.4 (L) %      MCV 90.3 fL      MCH 30.6 pg      MCHC 33.9 g/dL      RDW 13.5 %      RDW-SD 44.5 fl      MPV 10.6 fL      Platelets 194 10*3/mm3      Neutrophil % 40.5 %      Lymphocyte % 30.3 %      Monocyte % 24.5 (H) %      Eosinophil % 2.5 %      Basophil % 1.9 %      Immature Grans % 0.3 %      Neutrophils, Absolute 1.31 (L) 10*3/mm3      Lymphocytes, Absolute 0.98 10*3/mm3      Monocytes, Absolute 0.79 10*3/mm3      Eosinophils, Absolute 0.08 10*3/mm3      Basophils, Absolute 0.06 10*3/mm3      Immature Grans, Absolute 0.01 10*3/mm3     Manual Differential [[id number]] Collected:  03/23/17 0535    Specimen:  Blood Updated:  03/23/17 0643    CBC & Differential [[id number]] Collected:  03/23/17 0535    Specimen:  Blood Updated:  03/23/17 0644    Narrative:       The following orders were created for panel order CBC & Differential.  Procedure                               Abnormality         Status                     ---------                               -----------         ------                     Manual Differential[[id number]]                               In process                 Scan Slide[[id number]]                                                                   CBC Auto Differential[[id number]]         Abnormal            Final result                 Please view results for these tests on the individual orders.    Basic Metabolic Panel [[id number]]  (Abnormal) Collected:  03/23/17 0535    Specimen:  Blood Updated:  03/23/17 0646     Glucose 91 mg/dL      BUN 17 mg/dL      Creatinine 0.88 mg/dL      Sodium 140 mmol/L      Potassium 3.8 mmol/L      Chloride 98 mmol/L      CO2 34.0 (H) mmol/L      Calcium 8.4 mg/dL      eGFR Non African Amer 62 mL/min/1.73      BUN/Creatinine Ratio 19.3     Anion Gap 8.0 mmol/L     Narrative:       The MDRD GFR formula is only valid for adults with stable renal function between ages 18 and 70.          Imaging Results (last 24 hours)     Procedure  Component Value Units Date/Time    XR Chest 2 View [[id number]] Collected:  03/22/17 1452     Updated:  03/22/17 1455    Narrative:       Patient Name:  SANNA ZHANG  Patient ID:  [id number]   Ordering:  ERICKA LUJAN  Attending:  IRENE POLK  Referring:  ERICKA LUJAN  ------------------------------------------------  Chest PA and lateral  CLINICAL INDICATION: Shortness of breath. Anemia.    COMPARISON: October 17, 2011.    FINDINGS: Cardiac silhouette is normal in size. Pulmonary  vascularity is unremarkable.     No focal infiltrate or consolidation.  No pleural effusion.  No  pneumothorax.  Subtle fibrotic changes in both apices.    Fluid or thickening of the major fissures seen on lateral view  posteriorly.  The lungs are otherwise clear.      Impression:       CONCLUSION: Subtle bilateral apical fibrotic changes. Fluid  and/or thickening of the major fissures seen on lateral view.  Otherwise unremarkable chest.    Electronically signed by:  Jerrod Palacios MD  3/22/2017 2:54 PM CDT  Workstation: [id number]          Assessment/Plan     Principal Problem:    GI bleed  Active Problems:    COPD (chronic obstructive pulmonary disease)    A-fib    Hypertension    Anemia    Expressive aphasia    History of stroke    Patient is s/p 1 unit pRBCs for symptomatic anemia with continued symptommatic anemia will give 2 more units; Dr. Dickson is to see the patient today for evaluation of source of bleed with additional plan to follow. Will hold coagulation in setting of potential active bleed and monitor patient for signs of continued bleeding.           This document has been electronically signed by Louisa Cottrell MD on March 23, 2017 9:36 AM           Electronically signed by Александр Cruz MD at 3/23/2017  3:20 PM      Louisa Cottrell MD at 3/23/2017  9:36 AM  Version 1 of 2         Progress Note  Louisa Cottrell MD  Family Medicine Resident, PGY 1     LOS: 1 day   Patient Care  Team:  Charles Everett MD as PCP - General    Chief Complaint: dizziness, weakness, anemia    Subjective     Interval History:     Patient reports that she is feeling better, no issues at bedside with communication: patient has history of aphasia likely exacerbated by symptomatic anemia. Daughter at bedside was there to assist with communication.    History taken from: chart, patient, family    Medication Review:   Current Facility-Administered Medications   Medication Dose Route Frequency Provider Last Rate Last Dose   • atorvastatin (LIPITOR) tablet 40 mg  40 mg Oral Daily Ramírez Dunaway MD   40 mg at 03/23/17 0814   • famotidine (PEPCID) tablet 40 mg  40 mg Oral Daily Ramírez Dunaway MD   40 mg at 03/23/17 0814   • furosemide (LASIX) tablet 20 mg  20 mg Oral Once per day on Mon Wed Fri Ramírez Dunaway MD       • gabapentin (NEURONTIN) capsule 100 mg  100 mg Oral TID Ramírez Dunaway MD   100 mg at 03/23/17 0817   • HYDROcodone-acetaminophen (NORCO)  MG per tablet 1 tablet  1 tablet Oral BID Ramírez Dunaway MD   1 tablet at 03/23/17 0813   • lisinopril (PRINIVIL,ZESTRIL) tablet 10 mg  10 mg Oral Daily Ramírez Dunaway MD   10 mg at 03/23/17 0819   • pantoprazole (PROTONIX) EC tablet 40 mg  40 mg Oral Daily Ramírez Dunaway MD   40 mg at 03/23/17 0817   • rOPINIRole (REQUIP) tablet 0.5 mg  0.5 mg Oral QAM Ramírez Dunaway MD   0.5 mg at 03/23/17 0644   • rOPINIRole (REQUIP) tablet 1 mg  1 mg Oral Nightly Ramírez Dunaway MD   1 mg at 03/22/17 2200   • sodium chloride 0.9 % flush 1-10 mL  1-10 mL Intravenous PRN Ramírez Dunaway MD       • sodium chloride 0.9 % flush 10 mL  10 mL Intravenous PRN Haim Motley MD       • sotalol (BETAPACE) tablet 80 mg  80 mg Oral BID Ramírez Dunaway MD   80 mg at 03/23/17 0819   • traZODone (DESYREL) tablet 50 mg  50 mg Oral Nightly Ramírez Dunaway MD   50 mg at 03/22/17 2202       Review of Systems:   Review of Systems   Constitutional: Negative.   Negative for fatigue and fever.   HENT: Negative.  Negative for ear pain and sore throat.    Eyes: Negative.  Negative for pain and visual disturbance.   Respiratory: Negative.  Negative for cough and shortness of breath.    Cardiovascular: Negative.  Negative for chest pain and palpitations.   Gastrointestinal: Negative for abdominal pain and nausea.   Endocrine: Negative.    Genitourinary: Negative for dysuria.   Musculoskeletal: Negative for arthralgias.   Skin: Negative for rash.   Allergic/Immunologic: Negative.    Neurological: Negative for dizziness, weakness and headaches.   Psychiatric/Behavioral: Negative for sleep disturbance.       Objective     Vital Signs  Temp:  [96.9 °F (36.1 °C)-98.8 °F (37.1 °C)] 97.7 °F (36.5 °C)  Heart Rate:  [59-75] 64  Resp:  [15-18] 18  BP: ()/(45-65) 104/50    Physical Exam:  Physical Exam   Constitutional: She is oriented to person, place, and time. She appears well-developed and well-nourished.   HENT:   Head: Normocephalic and atraumatic.   Eyes: EOM are normal. Pupils are equal, round, and reactive to light.   Neck: Normal range of motion.   Cardiovascular: Normal rate, regular rhythm and normal heart sounds.  Exam reveals no gallop and no friction rub.    No murmur heard.  Pulmonary/Chest: Effort normal and breath sounds normal. No respiratory distress. She has no wheezes. She has no rales. She exhibits no tenderness.   Abdominal: Soft. Bowel sounds are normal. She exhibits no distension and no mass. There is no tenderness. There is no rebound and no guarding. No hernia.   Musculoskeletal: Normal range of motion.   Neurological: She is alert and oriented to person, place, and time.   Patient has hx aphasia   Skin: Skin is warm and dry. There is pallor.   Psychiatric: She has a normal mood and affect. Her behavior is normal. Judgment and thought content normal.        Results Review:    Lab Results (last 24 hours)     Procedure Component Value Units Date/Time     Comprehensive Metabolic Panel [[id number]]  (Abnormal) Collected:  03/22/17 1326    Specimen:  Blood Updated:  03/22/17 1354     Glucose 99 mg/dL      BUN 23 (H) mg/dL      Creatinine 0.92 mg/dL      Sodium 136 (L) mmol/L      Potassium 4.1 mmol/L      Chloride 96 mmol/L      CO2 31.0 mmol/L      Calcium 8.9 mg/dL      Total Protein 6.0 (L) g/dL      Albumin 3.60 g/dL      ALT (SGPT) 29 U/L      AST (SGOT) 28 U/L      Alkaline Phosphatase 53 U/L      Total Bilirubin 0.3 mg/dL      eGFR Non African Amer 59 mL/min/1.73      Globulin 2.4 gm/dL      A/G Ratio 1.5 g/dL      BUN/Creatinine Ratio 25.0     Anion Gap 9.0 mmol/L     Narrative:       The MDRD GFR formula is only valid for adults with stable renal function between ages 18 and 70.    CBC Auto Differential [[id number]]  (Abnormal) Collected:  03/22/17 1326    Specimen:  Blood Updated:  03/22/17 1403     WBC 4.54 10*3/mm3      RBC 2.11 (L) 10*6/mm3      Hemoglobin 6.3 (C) g/dL      Hematocrit 19.3 (L) %      MCV 91.5 fL      MCH 29.9 pg      MCHC 32.6 g/dL      RDW 13.6 %      RDW-SD 46.0 fl      MPV 10.8 fL      Platelets 242 10*3/mm3     Protime-INR [[id number]]  (Normal) Collected:  03/22/17 1326    Specimen:  Blood Updated:  03/22/17 1448     Protime 13.3 Seconds      INR 1.01    Narrative:       Therapeutic range for most indications is 2.0-3.0 INR,  or 2.5-3.5 for mechanical heart valves.    CBC & Differential [[id number]] Collected:  03/22/17 1326    Specimen:  Blood Updated:  03/22/17 6253    Narrative:       The following orders were created for panel order CBC & Differential.  Procedure                               Abnormality         Status                     ---------                               -----------         ------                     Manual Differential[[id number]]           Abnormal            Final result               CBC Auto Differential[[id number]]         Abnormal            Final result                 Please view results for these tests on the  individual orders.    Manual Differential [[id number]]  (Abnormal) Collected:  03/22/17 1326    Specimen:  Blood Updated:  03/22/17 1513     Neutrophil % 37.0 %      Lymphocyte % 40.0 %      Monocyte % 18.0 (H) %      Eosinophil % 5.0 %      Neutrophils Absolute 1.68 (L) 10*3/mm3      Lymphocytes Absolute 1.82 10*3/mm3      Monocytes Absolute 0.82 10*3/mm3      Eosinophils Absolute 0.23 10*3/mm3      Polychromasia Slight/1+     WBC Morphology Normal     Platelet Morphology Normal    [place] Draw [[id number]] Collected:  03/22/17 1326    Specimen:  Blood Updated:  03/22/17 1801    Narrative:       The following orders were created for panel order [place] Draw.  Procedure                               Abnormality         Status                     ---------                               -----------         ------                     Light Blue Top[[id number]]                                    Final result               Green Top (Gel)[[id number]]                                   Final result               Lavender Top[[id number]]                                      Final result               Gold Top - SST[[id number]]                                    Final result                 Please view results for these tests on the individual orders.    Light Blue Top [[id number]] Collected:  03/22/17 1326    Specimen:  Blood Updated:  03/22/17 1801     Extra Tube hold for add-on      Auto resulted       Green Top (Gel) [[id number]] Collected:  03/22/17 1326    Specimen:  Blood Updated:  03/22/17 1801     Extra Tube Hold for add-ons.      Auto resulted.       Lavender Top [[id number]] Collected:  03/22/17 1326    Specimen:  Blood Updated:  03/22/17 1801     Extra Tube hold for add-on      Auto resulted       Gold Top - SST [[id number]] Collected:  03/22/17 1326    Specimen:  Blood Updated:  03/22/17 1801     Extra Tube Hold for add-ons.      Auto resulted.       Reticulocytes [[id number]]  (Abnormal) Collected:  03/22/17 2151    Specimen:  Blood  Updated:  03/22/17 2200     Reticulocyte % 3.07 (H) %      Reticulocyte Absolute 0.0725 10*6/mm3      Immature Reticulocyte Fraction 15.5 %      Reticulocyte Production Index 0.67 %      Hematocrit 21.5 (L) %      Reticulated Hgb 25.7 (L) pg     CBC & Differential [[id number]] Collected:  03/22/17 2151    Specimen:  Blood Updated:  03/22/17 2207    Narrative:       The following orders were created for panel order CBC & Differential.  Procedure                               Abnormality         Status                     ---------                               -----------         ------                     Scan Slide[[id number]]                                                                   CBC Auto Differential[[id number]]         Abnormal            Final result                 Please view results for these tests on the individual orders.    CBC Auto Differential [[id number]]  (Abnormal) Collected:  03/22/17 2151    Specimen:  Blood Updated:  03/22/17 2207     WBC 4.12 10*3/mm3      RBC 2.36 (L) 10*6/mm3      Hemoglobin 7.2 (L) g/dL      Hematocrit 21.5 (L) %      MCV 91.1 fL      MCH 30.5 pg      MCHC 33.5 g/dL      RDW 13.9 %      RDW-SD 46.1 fl      MPV 10.0 fL      Platelets 171 10*3/mm3      Neutrophil % 39.6 %      Lymphocyte % 32.8 %      Monocyte % 22.8 (H) %      Eosinophil % 3.6 %      Basophil % 1.2 %      Immature Grans % 0.0 %      Neutrophils, Absolute 1.63 (L) 10*3/mm3      Lymphocytes, Absolute 1.35 10*3/mm3      Monocytes, Absolute 0.94 (H) 10*3/mm3      Eosinophils, Absolute 0.15 10*3/mm3      Basophils, Absolute 0.05 10*3/mm3      Immature Grans, Absolute 0.00 10*3/mm3     Basic Metabolic Panel [[id number]]  (Abnormal) Collected:  03/22/17 2151    Specimen:  Blood Updated:  03/22/17 2217     Glucose 92 mg/dL      BUN 19 mg/dL      Creatinine 0.87 mg/dL      Sodium 138 mmol/L      Potassium 4.2 mmol/L      Chloride 98 mmol/L      CO2 34.0 (H) mmol/L      Calcium 8.6 mg/dL      eGFR Non African Amer 63  mL/min/1.73      BUN/Creatinine Ratio 21.8     Anion Gap 6.0 mmol/L     Narrative:       The MDRD GFR formula is only valid for adults with stable renal function between ages 18 and 70.    aPTT [[id number]]  (Normal) Collected:  03/22/17 2151    Specimen:  Blood Updated:  03/22/17 2218     PTT 24.0 seconds     Narrative:       The recommended Heparin therapeutic range is 68-97 seconds.    Protime-INR [[id number]]  (Normal) Collected:  03/22/17 2151    Specimen:  Blood Updated:  03/22/17 2218     Protime 13.8 Seconds      INR 1.06    Narrative:       Therapeutic range for most indications is 2.0-3.0 INR,  or 2.5-3.5 for mechanical heart valves.    Iron Profile [[id number]]  (Abnormal) Collected:  03/22/17 2151    Specimen:  Blood Updated:  03/22/17 2225     Iron 32 (L) mcg/dL      TIBC 319 mcg/dL      Iron Saturation 10 (L) %     CBC Auto Differential [[id number]]  (Abnormal) Collected:  03/23/17 0535    Specimen:  Blood Updated:  03/23/17 0643     WBC 3.23 10*3/mm3      RBC 2.48 (L) 10*6/mm3      Hemoglobin 7.6 (L) g/dL      Hematocrit 22.4 (L) %      MCV 90.3 fL      MCH 30.6 pg      MCHC 33.9 g/dL      RDW 13.5 %      RDW-SD 44.5 fl      MPV 10.6 fL      Platelets 194 10*3/mm3      Neutrophil % 40.5 %      Lymphocyte % 30.3 %      Monocyte % 24.5 (H) %      Eosinophil % 2.5 %      Basophil % 1.9 %      Immature Grans % 0.3 %      Neutrophils, Absolute 1.31 (L) 10*3/mm3      Lymphocytes, Absolute 0.98 10*3/mm3      Monocytes, Absolute 0.79 10*3/mm3      Eosinophils, Absolute 0.08 10*3/mm3      Basophils, Absolute 0.06 10*3/mm3      Immature Grans, Absolute 0.01 10*3/mm3     Manual Differential [[id number]] Collected:  03/23/17 0535    Specimen:  Blood Updated:  03/23/17 0643    CBC & Differential [[id number]] Collected:  03/23/17 0535    Specimen:  Blood Updated:  03/23/17 0644    Narrative:       The following orders were created for panel order CBC & Differential.  Procedure                               Abnormality          Status                     ---------                               -----------         ------                     Manual Differential[[id number]]                               In process                 Scan Slide[[id number]]                                                                   CBC Auto Differential[[id number]]         Abnormal            Final result                 Please view results for these tests on the individual orders.    Basic Metabolic Panel [[id number]]  (Abnormal) Collected:  03/23/17 0535    Specimen:  Blood Updated:  03/23/17 0646     Glucose 91 mg/dL      BUN 17 mg/dL      Creatinine 0.88 mg/dL      Sodium 140 mmol/L      Potassium 3.8 mmol/L      Chloride 98 mmol/L      CO2 34.0 (H) mmol/L      Calcium 8.4 mg/dL      eGFR Non African Amer 62 mL/min/1.73      BUN/Creatinine Ratio 19.3     Anion Gap 8.0 mmol/L     Narrative:       The MDRD GFR formula is only valid for adults with stable renal function between ages 18 and 70.          Imaging Results (last 24 hours)     Procedure Component Value Units Date/Time    XR Chest 2 View [[id number]] Collected:  03/22/17 1452     Updated:  03/22/17 1455    Narrative:       Patient Name:  SANNA ZHANG  Patient ID:  [id number]   Ordering:  ERICKA LUJAN  Attending:  IRENE POLK  Referring:  ERICKA LUJAN  ------------------------------------------------  Chest PA and lateral  CLINICAL INDICATION: Shortness of breath. Anemia.    COMPARISON: October 17, 2011.    FINDINGS: Cardiac silhouette is normal in size. Pulmonary  vascularity is unremarkable.     No focal infiltrate or consolidation.  No pleural effusion.  No  pneumothorax.  Subtle fibrotic changes in both apices.    Fluid or thickening of the major fissures seen on lateral view  posteriorly.  The lungs are otherwise clear.      Impression:       CONCLUSION: Subtle bilateral apical fibrotic changes. Fluid  and/or thickening of the major fissures seen on lateral view.  Otherwise unremarkable  chest.    Electronically signed by:  Jerrod Palacios MD  3/22/2017 2:54 PM CDT  Workstation: [id number]          Assessment/Plan     Principal Problem:    GI bleed  Active Problems:    COPD (chronic obstructive pulmonary disease)    A-fib    Hypertension    Anemia    Expressive aphasia    History of stroke    Patient is s/p 1 unit pRBCs for symptomatic anemia with continued symptommatic anemia will give 2 more units; Dr. Dickson is to see the patient today for evaluation of source of bleed with additional plan to follow. Will hold coagulation in setting of potential active bleed and monitor patient for signs of continued bleeding.           This document has been electronically signed by Louisa Cottrell MD on 2017 9:36 AM           Electronically signed by Louisa Cottrell MD at 3/23/2017 10:41 AM          [address] [address]  Phone: [phone]  Fax:  Date: Mar 24, 2017      Ambulatory Referral to Home Health     Patient: Sarah Layton MRN: [id number]   [address] [place] [id number] : 1940  SSN:    Phone: [phone] Sex: F      INSURANCE PAYOR PLAN GROUP # SUBSCRIBER ID   Primary: [employer] REPL[employer]EMENT [id number]      Referring Provider Information:  LOUISA COTTRELL Phone: [phone] Fax:       Referral Information:   # Visits: 1 Referral Type: Home Health [42]   Urgency: Routine Referral Reason: Specialty Services Required   Start Date: Mar 24, 2017 End Date: To be determined by Insurer   Diagnosis: Gastrointestinal hemorrhage, unspecified gastrointestinal hemorrhage type (K92.2 [ICD-10-CM] 578.9 [ICD-9-CM])      Refer to Dept: [facility] HOME CARE  Refer to Provider:   Refer to Facility:      Face to Face Visit Date: 3/24/2017  Follow-up Provider for Plan of Care? I treated the patient in an acute care facility and will not continue treatment after  discharge.  Follow-up Provider: CATALINA MCFARLANE [[id number]]  Reason/Clinical Findings: GI bleed on anticoagulants  Describe mobility limitations that make leaving home difficult: confusion s/p CVA  Nursing/Therapeutic Services Requested: Skilled Nursing  Skilled nursing orders: Other (weekly H&H checks)  Frequency: 1 Week 1     This document serves as a request of services and does not constitute Insurance authorization or approval of services.  To determine eligibility, please contact the members Insurance carrier to verify and review coverage.     If you have medical questions regarding this request for services. Please contact [address] at [phone] between the hours of 8:00am - 5:00pm (Mon-Fri).             Verbal Order Mode:   Authorizing Provider: Louisa Cottrell MD  Authorizing Provider's NPI: [id number]     Order Entered By: Louisa Cottrell MD 3/24/2017 10:42 AM     Electronically signed by:

## 2017-03-24 NOTE — PLAN OF CARE
Problem: Patient Care Overview (Adult)  Goal: Plan of Care Review  Outcome: Ongoing (interventions implemented as appropriate)    03/24/17 0355   Coping/Psychosocial Response Interventions   Plan Of Care Reviewed With patient   Patient Care Overview   Progress improving   Outcome Evaluation   Outcome Summary/Follow up Plan Post transfusion H&H 10.4/30.4       Goal: Adult Individualization and Mutuality  Outcome: Ongoing (interventions implemented as appropriate)  Goal: Discharge Needs Assessment  Outcome: Ongoing (interventions implemented as appropriate)    Problem: Gastrointestinal Bleeding (Adult)  Goal: Signs and Symptoms of Listed Potential Problems Will be Absent or Manageable (Gastrointestinal Bleeding)  Outcome: Ongoing (interventions implemented as appropriate)

## 2017-03-24 NOTE — DISCHARGE SUMMARY
Inpatient Discharge Summary    BRIEF OVERVIEW  Admitting Provider: Александр Cruz MD  Discharge Provider: Александр Cruz MD  Primary Care Physician at Discharge: Charles Everett [phone]     Admission Date: 3/22/2017     Discharge Date: No discharge date for patient encounter.    Discharge Diagnosis  Present on Admission:  • (Resolved) GI bleed  • COPD (chronic obstructive pulmonary disease)  • A-fib  • Hypertension  • Anemia  • Expressive aphasia    Discharge Disposition  Home or Self Care  Code Status at Discharge: full code    Active Issues Requiring Follow-up  Issue: GI bleeding   Responsible Individual: Dr. Dickson  What is Needed: Continued monitoring of H&H qweekly by home health, possible scope planned for 1 month out.  Follow-up Appointments Arranged: Yes       Outpatient Follow-Up  Future Appointments  Date Time Provider Department Center   4/5/2017 2:30 PM Nolan Foster MD [facility] None       Additional Instructions for the Follow-ups that You Need to Schedule     Ambulatory Referral to Home Health    As directed    Face to Face Visit Date:  3/24/2017   Follow-up Provider for Plan of Care?:  I treated the patient in an acute care facility and will not continue treatment after discharge.   Follow-up Provider:  CATALINA SAAVEDRA   Reason/Clinical Findings:  GI bleed on anticoagulants   Describe mobility limitations that make leaving home difficult:  confusion s/p CVA   Nursing/Therapeutic Services Requested:  Skilled Nursing   Skilled nursing orders:  Other Comment - weekly H&H checks   Frequency:  1 Week 1       Discharge Follow-up with PCP    As directed    Follow Up Details:  Dr. Saavedra in one week after DC       Discharge Follow-up with Specialty    As directed    Specialty:  Dr. Dickson   Follow Up:  1 Month               Test Results Pending at Discharge      DETAILS OF HOSPITAL STAY    Presenting Problem/History of Present Illness  GI bleed [K92.2]  Gastrointestinal  hemorrhage, unspecified gastrointestinal hemorrhage type [K92.2]  Sarah Layton is a 76-year-old female who presented with complaints of weakness and dizziness. Patient has expressive aphasia from her recent stroke so the majority of her history was obtained from her daughter. The patient's daughter states that she noticed weakness and fatigue worsening on approximately the Thursday prior to admission. On the day of admission he had a post stroke follow-up with the neurologist in [place]. She was found to have a blood pressure in the 80s systolic, and was dizzy with standing. The neurologist's office did blood work and then discharged her to go to the [facility] urgent care. While they were on their way to the urgent care, the office called and stated that the patient's hemoglobin was 7.5 and needed to go to the emergency department. Neither the patient nor her daughter has noticed any melena or hematochezia. She was started on Coumadin after her stroke on February 22. She was transitioned as a result oh on March 9. Of note she has been feeling more short of breath, and has been using her oxygen more lately     Hospital Course  Patient received three units of PRBCs with resolution of her dyspnea and weakness. She initially required oxygen supplementation for augmentation of her dyspnea but is now on room air with no complaints. Patent had a recent CVA requiring continued anticoagulation and distant history of bleeding issues. Dr. Dickson has coordinated with the patients PCP to switch her to a reversible anticoagulant agent with weekly H&H checks for monitoring. Patient does have issues with confusion and disorientation following her stroke along with some expressive aphasia. Patient is better with family at bedside.     Operative Procedures Performed    Treatments: 3 units PRBCs  Consults: GI  Procedures: none  Pertinent Test Results:   Lab Results (last 24 hours)     Procedure Component Value Units  Date/Time    CBC & Differential [[id number]] Collected:  03/23/17 0535    Specimen:  Blood Updated:  03/23/17 1243    Narrative:       The following orders were created for panel order CBC & Differential.  Procedure                               Abnormality         Status                     ---------                               -----------         ------                     Manual Differential[[id number]]           Abnormal            Final result               Scan Slide[[id number]]                                                                   CBC Auto Differential[[id number]]         Abnormal            Final result                 Please view results for these tests on the individual orders.    Manual Differential [[id number]]  (Abnormal) Collected:  03/23/17 0535    Specimen:  Blood Updated:  03/23/17 1243     Neutrophil % 44.0 %      Lymphocyte % 27.0 %      Monocyte % 18.0 (H) %      Eosinophil % 3.0 %      Basophil % 2.0 %      Bands %  2.0 %      Atypical Lymphocyte % 4.0 %      Neutrophils Absolute 1.49 (L) 10*3/mm3      Lymphocytes Absolute 0.87 10*3/mm3      Monocytes Absolute 0.58 10*3/mm3      Eosinophils Absolute 0.10 10*3/mm3      Basophils Absolute 0.06 10*3/mm3      Anisocytosis Slight/1+     Hypochromia Slight/1+     Microcytes Slight/1+     WBC Morphology Normal     Platelet Estimate Adequate    Hemoglobin & Hematocrit, Blood [[id number]]  (Abnormal) Collected:  03/23/17 1901    Specimen:  Blood Updated:  03/23/17 1921     Hemoglobin 10.4 (L) g/dL      Hematocrit 30.4 (L) %     CBC Auto Differential [[id number]]  (Abnormal) Collected:  03/24/17 0553    Specimen:  Blood Updated:  03/24/17 0647     WBC 3.38 10*3/mm3      RBC 3.66 (L) 10*6/mm3      Hemoglobin 10.9 (L) g/dL      Hematocrit 32.5 (L) %      MCV 88.8 fL      MCH 29.8 pg      MCHC 33.5 g/dL      RDW 14.2 %      RDW-SD 46.7 (H) fl      MPV 11.0 fL      Platelets 190 10*3/mm3      Neutrophil % 30.5 (L) %      Lymphocyte % 41.7 %      Monocyte %  21.0 (H) %      Eosinophil % 5.3 %      Basophil % 1.5 %      Immature Grans % 0.0 %      Neutrophils, Absolute 1.03 (L) 10*3/mm3      Lymphocytes, Absolute 1.41 10*3/mm3      Monocytes, Absolute 0.71 10*3/mm3      Eosinophils, Absolute 0.18 10*3/mm3      Basophils, Absolute 0.05 10*3/mm3      Immature Grans, Absolute 0.00 10*3/mm3     CBC & Differential [[id number]] Collected:  03/24/17 0553    Specimen:  Blood Updated:  03/24/17 0648    Narrative:       The following orders were created for panel order CBC & Differential.  Procedure                               Abnormality         Status                     ---------                               -----------         ------                     Scan Slide[[id number]]                                                                   CBC Auto Differential[[id number]]         Abnormal            Final result                 Please view results for these tests on the individual orders.    Basic Metabolic Panel [[id number]]  (Abnormal) Collected:  03/24/17 0553    Specimen:  Blood Updated:  03/24/17 0659     Glucose 87 mg/dL      BUN 12 mg/dL      Creatinine 0.88 mg/dL      Sodium 141 mmol/L      Potassium 4.1 mmol/L      Chloride 99 mmol/L      CO2 34.0 (H) mmol/L      Calcium 8.8 mg/dL      eGFR Non African Amer 62 mL/min/1.73      BUN/Creatinine Ratio 13.6     Anion Gap 8.0 mmol/L     Narrative:       The MDRD GFR formula is only valid for adults with stable renal function between ages 18 and 70.            Physical Exam at Discharge  Discharge Condition: stable  Heart Rate: 54  Resp: 16  BP: 115/58  Temp: 98.3 °F (36.8 °C)  Weight: 155 lb (70.3 kg)  General appearance: alert, appears stated age and cooperative  Head: Normocephalic, without obvious abnormality, atraumatic  Lungs: clear to auscultation bilaterally  Heart: regular rate and rhythm, S1, S2 normal, no murmur, click, rub or gallop  Abdomen: soft, non-tender; bowel sounds normal; no masses,  no  organomegaly  Pulses: 2+ and symmetric  Neurologic: Mental status: alertness: alert, orientation: person, place             This document has been electronically signed by Louisa Cottrell MD on March 24, 2017 10:52 AM

## 2017-04-05 ENCOUNTER — OFFICE VISIT (OUTPATIENT)
Dept: CARDIOLOGY | Facility: CLINIC | Age: 77
End: 2017-04-05

## 2017-04-05 VITALS
BODY MASS INDEX: 24.33 KG/M2 | HEART RATE: 55 BPM | SYSTOLIC BLOOD PRESSURE: 122 MMHG | HEIGHT: 67 IN | WEIGHT: 155 LBS | DIASTOLIC BLOOD PRESSURE: 60 MMHG

## 2017-04-05 DIAGNOSIS — R06.02 SHORTNESS OF BREATH: ICD-10-CM

## 2017-04-05 DIAGNOSIS — Z86.73 HISTORY OF STROKE: Chronic | ICD-10-CM

## 2017-04-05 DIAGNOSIS — I26.99 OTHER PULMONARY EMBOLISM WITHOUT ACUTE COR PULMONALE, UNSPECIFIED CHRONICITY (HCC): ICD-10-CM

## 2017-04-05 DIAGNOSIS — E78.5 HYPERLIPIDEMIA, UNSPECIFIED HYPERLIPIDEMIA TYPE: ICD-10-CM

## 2017-04-05 DIAGNOSIS — I48.0 PAROXYSMAL ATRIAL FIBRILLATION (HCC): Primary | Chronic | ICD-10-CM

## 2017-04-05 PROBLEM — I63.40 CEREBRAL EMBOLISM WITH CEREBRAL INFARCTION (HCC): Status: ACTIVE | Noted: 2017-02-23

## 2017-04-05 PROBLEM — R41.89 IMPAIRED COGNITION: Status: ACTIVE | Noted: 2017-02-24

## 2017-04-05 PROCEDURE — 99213 OFFICE O/P EST LOW 20 MIN: CPT | Performed by: INTERNAL MEDICINE

## 2017-04-05 PROCEDURE — 93000 ELECTROCARDIOGRAM COMPLETE: CPT | Performed by: INTERNAL MEDICINE

## 2017-04-05 NOTE — PROGRESS NOTES
Sarah Layton  76 y.o. female    04/05/2017  1. Paroxysmal atrial fibrillation    2. History of stroke    3. Hyperlipidemia, unspecified hyperlipidemia type    4. Other pulmonary embolism without acute cor pulmonale, unspecified chronicity    5. Shortness of breath        History of Present Illness: Presented for routine follow-up with history of paroxysmal atrial fibrillation, CVA and recurrent GI bleed under care of Dr. Dickson      76 years old female patient with history of for hypertension, hypertensive disease, paroxysmal atrial fibrillation maintain sinus rhythm with the help of Betapace.  The patient have recurrent GI bleed with multiple packed RBC in the past.  Patient also developed premature embolic stroke unable to tolerate any oral anticoagulations due to significant GI bleeding.  Recently was started on Effient was discontinued given the risk benefit ratio and recent stroke.  EKG in the office in sinus rhythm.  Patient denies any orthopnea or PND, nauseous, vomiting, diarrhea.  Denies any dysuria or hematuria.  Denies any palpitation or fluttering.  Denies any syncope or near syncopal episode.            SUBJECTIVE    Allergies   Allergen Reactions   • Iodides Rash   • Penicillins Rash         Past Medical History:   Diagnosis Date   • A-fib    • Anemia    • COPD (chronic obstructive pulmonary disease)    • Emphysema of lung    • Hypertension    • Stroke          Past Surgical History:   Procedure Laterality Date   • APPENDECTOMY     • BRAIN SURGERY      anuyrism   • CHOLECYSTECTOMY     • HYSTERECTOMY           No family history on file.      Social History     Social History   • Marital status:      Spouse name: N/A   • Number of children: N/A   • Years of education: N/A     Occupational History   • Not on file.     Social History Main Topics   • Smoking status: Current Every Day Smoker     Packs/day: 1.00     Years: 60.00     Types: Cigarettes   • Smokeless tobacco: Never Used   • Alcohol  "use No   • Drug use: No   • Sexual activity: No     Other Topics Concern   • Not on file     Social History Narrative         Current Outpatient Prescriptions   Medication Sig Dispense Refill   • aspirin 81 MG chewable tablet Chew 81 mg Daily.     • famotidine (PEPCID) 40 MG tablet Take 40 mg by mouth Daily.     • furosemide (LASIX) 20 MG tablet Take 20 mg by mouth 3 (Three) Times a Week. Monday, Wednesday, Friday     • gabapentin (NEURONTIN) 100 MG capsule Take 100 mg by mouth 3 (Three) Times a Day.     • HYDROcodone-acetaminophen (NORCO)  MG per tablet Take 1 tablet by mouth 2 (Two) Times a Day.     • lisinopril (PRINIVIL,ZESTRIL) 10 MG tablet Take 10 mg by mouth Daily.     • Nutritional Supplements (ENSURE ACTIVE) liquid Take  by mouth.     • pantoprazole (PROTONIX) 40 MG EC tablet Take 40 mg by mouth Daily.     • prasugrel (EFFIENT) 5 MG tablet Take 1 tablet by mouth Daily. 30 tablet 0   • rOPINIRole (REQUIP) 0.5 MG tablet 0.5 mg. Take 1 tablet by mouth in the am and 2 tablets at bedtime     • sotalol (BETAPACE) 80 MG tablet Take 80 mg by mouth 2 (Two) Times a Day.     • traZODone (DESYREL) 50 MG tablet Take 50 mg by mouth Every Night.       No current facility-administered medications for this visit.          OBJECTIVE    /60  Pulse 55  Ht 67\" (170.2 cm)  Wt 155 lb (70.3 kg)  BMI 24.28 kg/m2        Review of Systems     Constitutional:  Denies recent weight loss, weight gain, fever or chills, no change in exercise tolerance     HENT:  Denies any hearing loss, epistaxis, hoarseness, or difficulty speaking.     Eyes: Wears eyeglasses or contact lenses     Respiratory:  Denies dyspnea with exertion,no cough, wheezing, or hemoptysis.     Cardiovascular: See H&P     Gastrointestinal:  History of recurrent GI bleed     Endocrine: Negative for cold intolerance, heat intolerance, polydipsia, polyphagia and polyuria. Denies any history of weight change, heat/cold intolerance, polydipsia, polyuria "     Genitourinary: Negative.      Musculoskeletal: Denies any history of arthritic symptoms or back problems     Skin:  Denies any change in hair or nails, rashes, or skin lesions.     Allergic/Immunologic: Negative.  Negative for environmental allergies, food allergies and immunocompromised state.     Neurological:  history of  strokes, TIA,    Hematological: Denies any food allergies, seasonal allergies, bleeding disorders, or lymphadenopathy.     Psychiatric/Behavioral: Denies any history of depression, substance abuse, or change in cognitive function.         Physical Exam     Constitutional: Cooperative, alert and oriented, well-developed, well-nourished, in no acute distress.     HENT:   Head: Normocephalic, normal hair patterns, no masses or tenderness.  Ears, Nose, and Throat: No gross abnormalities. No pallor or cyanosis. Dentition good.   Eyes: EOMS intact, PERRL, conjunctivae and lids unremarkable. Fundoscopic exam and visual fields not performed.   Neck: No palpable masses or adenopathy, no thyromegaly, no JVD, carotid pulses are full and equal bilaterally and without  Bruits.     Cardiovascular: Regular rhythm, S1 and S2 normal, no S3 or S4. Apical impulse not displaced. No murmurs, gallops, or rubs detected.     Pulmonary/Chest: Chest: normal symmetry, no tenderness to palpation, normal respiratory excursion, no intercostal retraction, no use of accessory muscles.            Pulmonary: Normal breath sounds. No rales or ronchi.    Abdominal: Abdomen soft, bowel sounds normoactive, no masses, no hepatosplenomegaly, non-tender, no bruits.     Musculoskeletal: No deformities, clubbing, cyanosis, erythema, or edema observed. There are no spinal abnormalities noted. Normal muscle strength and tone. Pulses full and equal in all extremities, no bruits auscultated.     Neurological: No gross motor or sensory deficits noted, affect appropriate, oriented to time, person, place.     Skin: Warm and dry to the  touch, no apparent skin lesions or masses noted.     Psychiatric: She has a normal mood and affect. Her behavior is normal. Judgment and thought content normal.           ECG 12 Lead  Date/Time: 4/5/2017 12:52 PM  Performed by: WES EMERY  Authorized by: WES EMERY   Comparison: not compared with previous ECG   Rhythm: sinus bradycardia  Comments: Sinus bradycardia at 55 bpm with evidence of old septal infarct              Lab Results   Component Value Date    WBC 3.38 03/24/2017    HGB 10.9 (L) 03/24/2017    HCT 32.5 (L) 03/24/2017    MCV 88.8 03/24/2017     03/24/2017     Lab Results   Component Value Date    GLUCOSE 87 03/24/2017    BUN 12 03/24/2017    CREATININE 0.88 03/24/2017    EGFRIFNONA 62 03/24/2017    BCR 13.6 03/24/2017    CO2 34.0 (H) 03/24/2017    CALCIUM 8.8 03/24/2017    ALBUMIN 3.60 03/22/2017    LABIL2 1.5 03/22/2017    AST 28 03/22/2017    ALT 29 03/22/2017     No results found for: CHOL  No results found for: TRIG  No results found for: HDL  No results found for: LDLCALC  No results found for: LDL  No results found for: HDLLDLRATIO  No components found for: CHOLHDL  No results found for: HGBA1C  No results found for: TSH, I3IESIF, F3AURJG, THYROIDAB        ASSESSMENT AND PLAN  #1 paroxysmal atrial fibrillations #2 hypertension with hypertensive heart disease #3 stroke early part of this year #4 history of recurrent GI bleed unable to take any oral anticoagulations.    Clinically there is no sign of any acute cardiac decompensation based on the clinical history physical finding. No Evidence of any active ischemia.  EKG confirmed the sinus rhythm.  She will continue Betapace.  Discussed with Dr. Everett and discontinue Effient.  Patient scheduled to see Dr. Dickson at the end of this month for GI evaluations given the history of recurrent GI bleed.  Hypertension good blood pressure control on lisinopril 10 mg with history gastritis we'll lack to see her back in 2 month to discuss  the possible options of left atrial appendage umbrella placement    Diagnoses and all orders for this visit:    Paroxysmal atrial fibrillation  -     ECG 12 Lead    History of stroke    Hyperlipidemia, unspecified hyperlipidemia type    Other pulmonary embolism without acute cor pulmonale, unspecified chronicity    Shortness of breath        Nolan Foster MD  4/5/2017  12:44 PM

## 2017-05-02 RX ORDER — HYDROCODONE BITARTRATE AND ACETAMINOPHEN 7.5; 325 MG/1; MG/1
1 TABLET ORAL 2 TIMES DAILY PRN
Status: ON HOLD | COMMUNITY
End: 2018-06-19

## 2017-05-02 RX ORDER — ROPINIROLE 1 MG/1
1 TABLET, FILM COATED ORAL NIGHTLY
COMMUNITY

## 2017-05-08 RX ORDER — FUROSEMIDE 20 MG/1
20 TABLET ORAL 3 TIMES WEEKLY
Qty: 12 TABLET | Refills: 6 | Status: SHIPPED | OUTPATIENT
Start: 2017-05-08

## 2017-05-09 ENCOUNTER — ANESTHESIA EVENT (OUTPATIENT)
Dept: GASTROENTEROLOGY | Facility: HOSPITAL | Age: 77
End: 2017-05-09

## 2017-05-09 ENCOUNTER — HOSPITAL ENCOUNTER (OUTPATIENT)
Facility: HOSPITAL | Age: 77
Setting detail: HOSPITAL OUTPATIENT SURGERY
Discharge: HOME | End: 2017-05-09
Attending: INTERNAL MEDICINE | Admitting: INTERNAL MEDICINE

## 2017-05-09 ENCOUNTER — ANESTHESIA (OUTPATIENT)
Dept: GASTROENTEROLOGY | Facility: HOSPITAL | Age: 77
End: 2017-05-09

## 2017-05-09 VITALS
RESPIRATION RATE: 18 BRPM | TEMPERATURE: 97.6 F | BODY MASS INDEX: 24.05 KG/M2 | HEIGHT: 67 IN | HEART RATE: 67 BPM | OXYGEN SATURATION: 94 % | DIASTOLIC BLOOD PRESSURE: 50 MMHG | SYSTOLIC BLOOD PRESSURE: 114 MMHG | WEIGHT: 153.22 LBS

## 2017-05-09 DIAGNOSIS — K92.2 HEMORRHAGE OF GASTROINTESTINAL TRACT, UNSPECIFIED: ICD-10-CM

## 2017-05-09 DIAGNOSIS — D13.39: ICD-10-CM

## 2017-05-09 PROCEDURE — 88305 TISSUE EXAM BY PATHOLOGIST: CPT | Performed by: PATHOLOGY

## 2017-05-09 PROCEDURE — 25010000002 PROPOFOL 10 MG/ML EMULSION: Performed by: NURSE ANESTHETIST, CERTIFIED REGISTERED

## 2017-05-09 PROCEDURE — 25010000002 FENTANYL CITRATE (PF) 100 MCG/2ML SOLUTION: Performed by: NURSE ANESTHETIST, CERTIFIED REGISTERED

## 2017-05-09 PROCEDURE — 88305 TISSUE EXAM BY PATHOLOGIST: CPT | Performed by: INTERNAL MEDICINE

## 2017-05-09 RX ORDER — DEXTROSE AND SODIUM CHLORIDE 5; .45 G/100ML; G/100ML
30 INJECTION, SOLUTION INTRAVENOUS CONTINUOUS
Status: DISCONTINUED | OUTPATIENT
Start: 2017-05-09 | End: 2017-05-09 | Stop reason: HOSPADM

## 2017-05-09 RX ORDER — FENTANYL CITRATE 50 UG/ML
INJECTION, SOLUTION INTRAMUSCULAR; INTRAVENOUS AS NEEDED
Status: DISCONTINUED | OUTPATIENT
Start: 2017-05-09 | End: 2017-05-09 | Stop reason: SURG

## 2017-05-09 RX ORDER — LIDOCAINE HYDROCHLORIDE 10 MG/ML
INJECTION, SOLUTION INFILTRATION; PERINEURAL AS NEEDED
Status: DISCONTINUED | OUTPATIENT
Start: 2017-05-09 | End: 2017-05-09 | Stop reason: SURG

## 2017-05-09 RX ORDER — PROPOFOL 10 MG/ML
VIAL (ML) INTRAVENOUS AS NEEDED
Status: DISCONTINUED | OUTPATIENT
Start: 2017-05-09 | End: 2017-05-09 | Stop reason: SURG

## 2017-05-09 RX ADMIN — FENTANYL CITRATE 50 MCG: 50 INJECTION, SOLUTION INTRAMUSCULAR; INTRAVENOUS at 08:37

## 2017-05-09 RX ADMIN — LIDOCAINE HYDROCHLORIDE 60 MG: 10 INJECTION, SOLUTION INFILTRATION; PERINEURAL at 08:38

## 2017-05-09 RX ADMIN — PROPOFOL 30 MG: 10 INJECTION, EMULSION INTRAVENOUS at 08:58

## 2017-05-09 RX ADMIN — PROPOFOL 40 MG: 10 INJECTION, EMULSION INTRAVENOUS at 08:44

## 2017-05-09 RX ADMIN — PROPOFOL 30 MG: 10 INJECTION, EMULSION INTRAVENOUS at 09:05

## 2017-05-09 RX ADMIN — DEXTROSE AND SODIUM CHLORIDE 30 ML/HR: 5; 450 INJECTION, SOLUTION INTRAVENOUS at 08:36

## 2017-05-09 RX ADMIN — PROPOFOL 20 MG: 10 INJECTION, EMULSION INTRAVENOUS at 09:01

## 2017-05-09 RX ADMIN — FENTANYL CITRATE 25 MCG: 50 INJECTION, SOLUTION INTRAMUSCULAR; INTRAVENOUS at 08:55

## 2017-05-09 RX ADMIN — PROPOFOL 60 MG: 10 INJECTION, EMULSION INTRAVENOUS at 08:39

## 2017-05-09 RX ADMIN — PROPOFOL 20 MG: 10 INJECTION, EMULSION INTRAVENOUS at 08:55

## 2017-05-09 RX ADMIN — FENTANYL CITRATE 25 MCG: 50 INJECTION, SOLUTION INTRAMUSCULAR; INTRAVENOUS at 08:50

## 2017-05-09 RX ADMIN — PROPOFOL 50 MG: 10 INJECTION, EMULSION INTRAVENOUS at 08:50

## 2017-05-10 LAB
LAB AP CASE REPORT: NORMAL
Lab: NORMAL
PATH REPORT.FINAL DX SPEC: NORMAL
PATH REPORT.GROSS SPEC: NORMAL

## 2017-06-12 ENCOUNTER — OFFICE VISIT (OUTPATIENT)
Dept: CARDIOLOGY | Facility: CLINIC | Age: 77
End: 2017-06-12

## 2017-06-12 VITALS
WEIGHT: 150 LBS | HEIGHT: 67 IN | HEART RATE: 50 BPM | SYSTOLIC BLOOD PRESSURE: 120 MMHG | DIASTOLIC BLOOD PRESSURE: 80 MMHG | BODY MASS INDEX: 23.54 KG/M2

## 2017-06-12 DIAGNOSIS — I26.99 OTHER PULMONARY EMBOLISM WITHOUT ACUTE COR PULMONALE, UNSPECIFIED CHRONICITY (HCC): ICD-10-CM

## 2017-06-12 DIAGNOSIS — I48.0 PAROXYSMAL ATRIAL FIBRILLATION (HCC): Chronic | ICD-10-CM

## 2017-06-12 DIAGNOSIS — E78.5 HYPERLIPIDEMIA, UNSPECIFIED HYPERLIPIDEMIA TYPE: ICD-10-CM

## 2017-06-12 DIAGNOSIS — I10 ESSENTIAL HYPERTENSION: Chronic | ICD-10-CM

## 2017-06-12 DIAGNOSIS — R06.02 SHORTNESS OF BREATH: Primary | ICD-10-CM

## 2017-06-12 DIAGNOSIS — Z86.73 HISTORY OF STROKE: Chronic | ICD-10-CM

## 2017-06-12 PROCEDURE — 93000 ELECTROCARDIOGRAM COMPLETE: CPT | Performed by: INTERNAL MEDICINE

## 2017-06-12 PROCEDURE — 99213 OFFICE O/P EST LOW 20 MIN: CPT | Performed by: INTERNAL MEDICINE

## 2017-06-12 NOTE — PROGRESS NOTES
Sarah Layton  76 y.o. female    06/12/2017  1. Shortness of breath    2. Hyperlipidemia, unspecified hyperlipidemia type    3. Paroxysmal atrial fibrillation    4. Essential hypertension    5. Other pulmonary embolism without acute cor pulmonale, unspecified chronicity    6. History of stroke        History of Present Illness: Routine follow-up      76 years old patient with history of hypertension, hypertensive heart disease, paroxysmal atrial fibrillation maintain sinus rhythm with the help of Betapace.  History of for pulmonary embolism  and stroke recovered well from that.  Patient patient extensive history of GI bleeding requiring 2 packed RBC and high risk for bleeding.  Recently evaluated by Dr. Dickson reported to have a bleeding polyp in AND COUPLE SPOTS IN THE STOMACH WITH HISTORY OF GASTRITIS.  Patient denies orthopnea, PND, and nauseous.  Denies any polydipsia polyuria.  Denies any fever cough or chills.  Denies any palpitation.  Mentioned  right-sided chest discomfort tender upon palpation under the right axillary area.            SUBJECTIVE    Allergies   Allergen Reactions   • Iodides Rash   • Penicillins Rash         Past Medical History:   Diagnosis Date   • A-fib    • Anemia    • COPD (chronic obstructive pulmonary disease)    • Emphysema of lung    • Hypertension    • Stroke          Past Surgical History:   Procedure Laterality Date   • APPENDECTOMY     • BRAIN SURGERY      anuyrism   • CHOLECYSTECTOMY     • COLONOSCOPY N/A 5/9/2017    Procedure: COLONOSCOPY;  Surgeon: Bhaskar Dickson DO;  Location: [facility] ENDOSCOPY;  Service:    • ENDOSCOPY N/A 5/9/2017    Procedure: ESOPHAGOGASTRODUODENOSCOPY;  Surgeon: Bhaskar Dickson DO;  Location: [facility] ENDOSCOPY;  Service:    • HYSTERECTOMY           No family history on file.      Social History     Social History   • Marital status:      Spouse name: N/A   • Number of children: N/A   • Years of education: N/A     Occupational History   •  "Not on file.     Social History Main Topics   • Smoking status: Current Every Day Smoker     Packs/day: 1.00     Years: 60.00     Types: Cigarettes   • Smokeless tobacco: Never Used   • Alcohol use No   • Drug use: No   • Sexual activity: No     Other Topics Concern   • Not on file     Social History Narrative         Current Outpatient Prescriptions   Medication Sig Dispense Refill   • aspirin 81 MG chewable tablet Chew 81 mg Daily.     • famotidine (PEPCID) 40 MG tablet Take 40 mg by mouth Daily.     • furosemide (LASIX) 20 MG tablet Take 1 tablet by mouth 3 (Three) Times a Week. Monday, Wednesday, Friday 12 tablet 6   • gabapentin (NEURONTIN) 100 MG capsule Take 100 mg by mouth 3 (Three) Times a Day.     • HYDROcodone-acetaminophen (NORCO) 7.5-325 MG per tablet Take 1 tablet by mouth 2 (Two) Times a Day As Needed for Moderate Pain (4-6).     • lisinopril (PRINIVIL,ZESTRIL) 10 MG tablet Take 10 mg by mouth Daily.     • Nutritional Supplements (ENSURE ACTIVE) liquid Take  by mouth.     • pantoprazole (PROTONIX) 40 MG EC tablet Take 40 mg by mouth Daily.     • rOPINIRole (REQUIP) 1 MG tablet Take 1 mg by mouth Every Night. Take 1 hour before bedtime.     • sotalol (BETAPACE) 80 MG tablet Take 80 mg by mouth 2 (Two) Times a Day.     • traZODone (DESYREL) 50 MG tablet Take 50 mg by mouth Every Night.       No current facility-administered medications for this visit.          OBJECTIVE    /80  Pulse 50  Ht 67\" (170.2 cm)  Wt 150 lb (68 kg)  BMI 23.49 kg/m2        Review of Systems     Constitutional:  Denies recent weight loss, weight gain, fever or chills, no change in exercise tolerance     HENT:  Denies any hearing loss, epistaxis, hoarseness, or difficulty speaking.     Eyes:No bluring    Respiratory:  Denies dyspnea with exertion,no cough, wheezing, or hemoptysis.     Cardiovascular: See H&P    Gastrointestinal: History of GI bleed    Endocrine: Negative for cold intolerance, heat intolerance, " polydipsia, polyphagia and polyuria. Denies any history of weight change, heat/cold intolerance, polydipsia, polyuria     Genitourinary: Negative.      Musculoskeletal: Denies any history of arthritic symptoms or back problems     Skin:  Denies any change in hair or nails, rashes, or skin lesions.     Allergic/Immunologic: Negative.  Negative for environmental allergies, food allergies and immunocompromised state.     Neurological: History of for TIA    Hematological: Denies any food allergies, seasonal allergies, bleeding disorders, or lymphadenopathy.     Psychiatric/Behavioral: Denies any history of depression, substance abuse, or change in cognitive function.         Physical Exam     Constitutional: Cooperative, alert and oriented, well-developed, well-nourished, in no acute distress.     HENT:   Head: Normocephalic, normal hair patterns, no masses or tenderness.  Ears, Nose, and Throat: No gross abnormalities. No pallor or cyanosis. Dentition good.   Eyes: EOMS intact, PERRL, conjunctivae and lids unremarkable. Fundoscopic exam and visual fields not performed.   Neck: No palpable masses or adenopathy, no thyromegaly, no JVD, carotid pulses are full and equal bilaterally and without  Bruits.     Cardiovascular: Regular rhythm, S1 and S2 normal, no S3 or S4. Apical impulse not displaced. No murmurs, gallops, or rubs detected.     Pulmonary/Chest: Chest: normal symmetry, no tenderness to palpation, normal respiratory excursion, no intercostal retraction, no use of accessory muscles.            Pulmonary: Normal breath sounds. No rales or ronchi.    Abdominal: Abdomen soft, bowel sounds normoactive, no masses, no hepatosplenomegaly, non-tender, no bruits.     Musculoskeletal: No deformities, clubbing, cyanosis, erythema, or edema observed. There are no spinal abnormalities noted. Normal muscle strength and tone. Pulses full and equal in all extremities, no bruits auscultated.     Neurological: No gross motor or  sensory deficits noted, affect appropriate, oriented to time, person, place.     Skin: Warm and dry to the touch, no apparent skin lesions or masses noted.     Psychiatric: She has a normal mood and affect. Her behavior is normal. Judgment and thought content normal.           ECG 12 Lead  Date/Time: 6/12/2017 8:35 AM  Performed by: WES EMERY  Authorized by: WES EMERY   Comparison: not compared with previous ECG   Rhythm: sinus bradycardia  Comments: Sinus bradycardia with evidence of old septal infarct              Lab Results   Component Value Date    WBC 3.38 03/24/2017    HGB 10.9 (L) 03/24/2017    HCT 32.5 (L) 03/24/2017    MCV 88.8 03/24/2017     03/24/2017     Lab Results   Component Value Date    GLUCOSE 87 03/24/2017    BUN 12 03/24/2017    CREATININE 0.88 03/24/2017    EGFRIFNONA 62 03/24/2017    BCR 13.6 03/24/2017    CO2 34.0 (H) 03/24/2017    CALCIUM 8.8 03/24/2017    ALBUMIN 3.60 03/22/2017    LABIL2 1.5 03/22/2017    AST 28 03/22/2017    ALT 29 03/22/2017     No results found for: CHOL  No results found for: TRIG  No results found for: HDL  No results found for: LDLCALC  No results found for: LDL  No results found for: HDLLDLRATIO  No components found for: CHOLHDL  No results found for: HGBA1C  No results found for: TSH, X3ESDIK, G5PJTDL, THYROIDAB        ASSESSMENT AND PLAN  #1 paroxysmal atrial fibrillation #2 hypertension with hypertensive heart disease #3 history of stroke and GI bleed    Clinically no sign of any cardiac decompensation based the clinical history physical finding.  EKG done and finding discussed the patient.  The patient will continue sotalol to decrease the recurrence of atrial fibrillation and baby aspirin.  Hypertension being treated with lisinopril good blood pressure control and take care With History of Gastritis.  We'll See Her Back in 6 Month Depends on Patient Clinical Conditions. EKG  Finding Discussed with  Patient    Diagnoses and all orders for this  visit:    Shortness of breath    Hyperlipidemia, unspecified hyperlipidemia type    Paroxysmal atrial fibrillation  -     ECG 12 Lead    Essential hypertension    Other pulmonary embolism without acute cor pulmonale, unspecified chronicity    History of stroke        Nolan Foster MD  6/12/2017  8:30 AM

## 2018-06-19 ENCOUNTER — HOSPITAL ENCOUNTER (INPATIENT)
Facility: HOSPITAL | Age: 78
LOS: 2 days | Discharge: HOME | End: 2018-06-21
Attending: EMERGENCY MEDICINE | Admitting: FAMILY MEDICINE

## 2018-06-19 DIAGNOSIS — R53.83 OTHER FATIGUE: ICD-10-CM

## 2018-06-19 DIAGNOSIS — D64.9 SYMPTOMATIC ANEMIA: Primary | ICD-10-CM

## 2018-06-19 PROBLEM — D50.0 CHRONIC BLOOD LOSS ANEMIA: Chronic | Status: ACTIVE | Noted: 2018-06-19

## 2018-06-19 LAB
ABO GROUP BLD: NORMAL
ANION GAP SERPL CALCULATED.3IONS-SCNC: 9 MMOL/L (ref 5–15)
ANISOCYTOSIS BLD QL: ABNORMAL
APTT PPP: 26.9 SECONDS (ref 20–40.3)
BASOPHILS # BLD MANUAL: 0.08 10*3/MM3 (ref 0–0.2)
BASOPHILS NFR BLD AUTO: 1 % (ref 0–2)
BLD GP AB SCN SERPL QL: NEGATIVE
BUN BLD-MCNC: 26 MG/DL (ref 7–21)
BUN/CREAT SERPL: 23 (ref 7–25)
CALCIUM SPEC-SCNC: 9.1 MG/DL (ref 8.4–10.2)
CHLORIDE SERPL-SCNC: 101 MMOL/L (ref 95–110)
CO2 SERPL-SCNC: 30 MMOL/L (ref 22–31)
CREAT BLD-MCNC: 1.13 MG/DL (ref 0.5–1)
DEPRECATED RDW RBC AUTO: 47.3 FL (ref 36.4–46.3)
EOSINOPHIL # BLD MANUAL: 0.16 10*3/MM3 (ref 0–0.7)
EOSINOPHIL NFR BLD MANUAL: 2 % (ref 0–7)
ERYTHROCYTE [DISTWIDTH] IN BLOOD BY AUTOMATED COUNT: 18.9 % (ref 11.5–14.5)
GFR SERPL CREATININE-BSD FRML MDRD: 47 ML/MIN/1.73 (ref 39–90)
GLUCOSE BLD-MCNC: 99 MG/DL (ref 60–100)
HCT VFR BLD AUTO: 20.4 % (ref 35–45)
HGB BLD-MCNC: 5.4 G/DL (ref 12–15.5)
HOLD SPECIMEN: NORMAL
HYPOCHROMIA BLD QL: ABNORMAL
INR PPP: 1.11 (ref 0.8–1.2)
LYMPHOCYTES # BLD MANUAL: 2.48 10*3/MM3 (ref 0.6–4.2)
LYMPHOCYTES NFR BLD MANUAL: 14 % (ref 0–12)
LYMPHOCYTES NFR BLD MANUAL: 32 % (ref 10–50)
MAGNESIUM SERPL-MCNC: 2.2 MG/DL (ref 1.6–2.3)
MCH RBC QN AUTO: 17.9 PG (ref 26.5–34)
MCHC RBC AUTO-ENTMCNC: 26.5 G/DL (ref 31.4–36)
MCV RBC AUTO: 67.8 FL (ref 80–98)
MONOCYTES # BLD AUTO: 1.09 10*3/MM3 (ref 0–0.9)
NEUTROPHILS # BLD AUTO: 3.96 10*3/MM3 (ref 2–8.6)
NEUTROPHILS NFR BLD MANUAL: 50 % (ref 37–80)
NEUTS BAND NFR BLD MANUAL: 1 % (ref 0–5)
OVALOCYTES BLD QL SMEAR: ABNORMAL
PHOSPHATE SERPL-MCNC: 3.8 MG/DL (ref 2.4–4.4)
PLATELET # BLD AUTO: 322 10*3/MM3 (ref 150–450)
PMV BLD AUTO: 10 FL (ref 8–12)
POIKILOCYTOSIS BLD QL SMEAR: ABNORMAL
POLYCHROMASIA BLD QL SMEAR: ABNORMAL
POTASSIUM BLD-SCNC: 3.5 MMOL/L (ref 3.5–5.1)
PROTHROMBIN TIME: 14.1 SECONDS (ref 11.1–15.3)
RBC # BLD AUTO: 3.01 10*6/MM3 (ref 3.77–5.16)
RH BLD: POSITIVE
SMALL PLATELETS BLD QL SMEAR: ADEQUATE
SODIUM BLD-SCNC: 140 MMOL/L (ref 137–145)
T&S EXPIRATION DATE: NORMAL
TARGETS BLD QL SMEAR: ABNORMAL
WBC MORPH BLD: NORMAL
WBC NRBC COR # BLD: 7.76 10*3/MM3 (ref 3.2–9.8)
WHOLE BLOOD HOLD SPECIMEN: NORMAL

## 2018-06-19 PROCEDURE — 36430 TRANSFUSION BLD/BLD COMPNT: CPT

## 2018-06-19 PROCEDURE — 85730 THROMBOPLASTIN TIME PARTIAL: CPT | Performed by: EMERGENCY MEDICINE

## 2018-06-19 PROCEDURE — 83540 ASSAY OF IRON: CPT | Performed by: FAMILY MEDICINE

## 2018-06-19 PROCEDURE — 85610 PROTHROMBIN TIME: CPT | Performed by: EMERGENCY MEDICINE

## 2018-06-19 PROCEDURE — 82607 VITAMIN B-12: CPT | Performed by: FAMILY MEDICINE

## 2018-06-19 PROCEDURE — 82746 ASSAY OF FOLIC ACID SERUM: CPT | Performed by: FAMILY MEDICINE

## 2018-06-19 PROCEDURE — 83550 IRON BINDING TEST: CPT | Performed by: FAMILY MEDICINE

## 2018-06-19 PROCEDURE — 86900 BLOOD TYPING SEROLOGIC ABO: CPT | Performed by: EMERGENCY MEDICINE

## 2018-06-19 PROCEDURE — 85007 BL SMEAR W/DIFF WBC COUNT: CPT | Performed by: EMERGENCY MEDICINE

## 2018-06-19 PROCEDURE — 85025 COMPLETE CBC W/AUTO DIFF WBC: CPT | Performed by: EMERGENCY MEDICINE

## 2018-06-19 PROCEDURE — 82728 ASSAY OF FERRITIN: CPT | Performed by: FAMILY MEDICINE

## 2018-06-19 PROCEDURE — 86900 BLOOD TYPING SEROLOGIC ABO: CPT

## 2018-06-19 PROCEDURE — 99284 EMERGENCY DEPT VISIT MOD MDM: CPT

## 2018-06-19 PROCEDURE — 86923 COMPATIBILITY TEST ELECTRIC: CPT

## 2018-06-19 PROCEDURE — P9016 RBC LEUKOCYTES REDUCED: HCPCS

## 2018-06-19 PROCEDURE — 86850 RBC ANTIBODY SCREEN: CPT | Performed by: EMERGENCY MEDICINE

## 2018-06-19 PROCEDURE — 86901 BLOOD TYPING SEROLOGIC RH(D): CPT | Performed by: EMERGENCY MEDICINE

## 2018-06-19 PROCEDURE — 84100 ASSAY OF PHOSPHORUS: CPT | Performed by: EMERGENCY MEDICINE

## 2018-06-19 PROCEDURE — 83735 ASSAY OF MAGNESIUM: CPT | Performed by: EMERGENCY MEDICINE

## 2018-06-19 PROCEDURE — 80048 BASIC METABOLIC PNL TOTAL CA: CPT | Performed by: EMERGENCY MEDICINE

## 2018-06-19 RX ORDER — ROPINIROLE 1 MG/1
1 TABLET, FILM COATED ORAL NIGHTLY
Status: DISCONTINUED | OUTPATIENT
Start: 2018-06-19 | End: 2018-06-21 | Stop reason: HOSPADM

## 2018-06-19 RX ORDER — CLOPIDOGREL BISULFATE 75 MG/1
75 TABLET ORAL DAILY
COMMUNITY
End: 2018-06-21 | Stop reason: HOSPADM

## 2018-06-19 RX ORDER — SODIUM CHLORIDE 0.9 % (FLUSH) 0.9 %
10 SYRINGE (ML) INJECTION AS NEEDED
Status: DISCONTINUED | OUTPATIENT
Start: 2018-06-19 | End: 2018-06-21 | Stop reason: HOSPADM

## 2018-06-19 RX ORDER — SOTALOL HYDROCHLORIDE 80 MG/1
80 TABLET ORAL
Status: DISCONTINUED | OUTPATIENT
Start: 2018-06-20 | End: 2018-06-21 | Stop reason: HOSPADM

## 2018-06-19 RX ORDER — GABAPENTIN 100 MG/1
100 CAPSULE ORAL EVERY 12 HOURS SCHEDULED
Status: DISCONTINUED | OUTPATIENT
Start: 2018-06-19 | End: 2018-06-21 | Stop reason: HOSPADM

## 2018-06-19 RX ORDER — SODIUM CHLORIDE 0.9 % (FLUSH) 0.9 %
1-10 SYRINGE (ML) INJECTION AS NEEDED
Status: DISCONTINUED | OUTPATIENT
Start: 2018-06-19 | End: 2018-06-21 | Stop reason: HOSPADM

## 2018-06-19 RX ORDER — HYDROCODONE BITARTRATE AND ACETAMINOPHEN 5; 325 MG/1; MG/1
1 TABLET ORAL 2 TIMES DAILY PRN
COMMUNITY
Start: 2018-06-19 | End: 2018-08-19

## 2018-06-19 RX ORDER — HYDROCODONE BITARTRATE AND ACETAMINOPHEN 7.5; 325 MG/1; MG/1
1 TABLET ORAL 2 TIMES DAILY PRN
Status: DISCONTINUED | OUTPATIENT
Start: 2018-06-19 | End: 2018-06-21 | Stop reason: HOSPADM

## 2018-06-19 RX ORDER — TRAZODONE HYDROCHLORIDE 50 MG/1
50 TABLET ORAL NIGHTLY
Status: DISCONTINUED | OUTPATIENT
Start: 2018-06-19 | End: 2018-06-21 | Stop reason: HOSPADM

## 2018-06-19 RX ORDER — SIMVASTATIN 20 MG
20 TABLET ORAL NIGHTLY
COMMUNITY

## 2018-06-19 RX ORDER — LISINOPRIL 10 MG/1
10 TABLET ORAL DAILY
Status: DISCONTINUED | OUTPATIENT
Start: 2018-06-20 | End: 2018-06-21 | Stop reason: HOSPADM

## 2018-06-19 RX ORDER — FUROSEMIDE 20 MG/1
20 TABLET ORAL
Status: DISCONTINUED | OUTPATIENT
Start: 2018-06-20 | End: 2018-06-21 | Stop reason: HOSPADM

## 2018-06-19 RX ORDER — PANTOPRAZOLE SODIUM 40 MG/1
40 TABLET, DELAYED RELEASE ORAL DAILY
Status: DISCONTINUED | OUTPATIENT
Start: 2018-06-20 | End: 2018-06-21 | Stop reason: HOSPADM

## 2018-06-20 LAB
ANION GAP SERPL CALCULATED.3IONS-SCNC: 9 MMOL/L (ref 5–15)
BUN BLD-MCNC: 23 MG/DL (ref 7–21)
BUN/CREAT SERPL: 25.8 (ref 7–25)
CALCIUM SPEC-SCNC: 9 MG/DL (ref 8.4–10.2)
CHLORIDE SERPL-SCNC: 103 MMOL/L (ref 95–110)
CO2 SERPL-SCNC: 28 MMOL/L (ref 22–31)
CREAT BLD-MCNC: 0.89 MG/DL (ref 0.5–1)
DEPRECATED RDW RBC AUTO: 54.4 FL (ref 36.4–46.3)
ERYTHROCYTE [DISTWIDTH] IN BLOOD BY AUTOMATED COUNT: 20.6 % (ref 11.5–14.5)
FERRITIN SERPL-MCNC: 4.2 NG/ML (ref 11.1–264)
FOLATE SERPL-MCNC: >20 NG/ML (ref 2.76–21)
GFR SERPL CREATININE-BSD FRML MDRD: 61 ML/MIN/1.73 (ref 39–90)
GLUCOSE BLD-MCNC: 90 MG/DL (ref 60–100)
HCT VFR BLD AUTO: 25.8 % (ref 35–45)
HGB BLD-MCNC: 7.8 G/DL (ref 12–15.5)
IRON 24H UR-MRATE: 11 MCG/DL (ref 37–170)
IRON SATN MFR SERPL: 2 % (ref 15–50)
MCH RBC QN AUTO: 21.7 PG (ref 26.5–34)
MCHC RBC AUTO-ENTMCNC: 30.2 G/DL (ref 31.4–36)
MCV RBC AUTO: 71.9 FL (ref 80–98)
PLATELET # BLD AUTO: 234 10*3/MM3 (ref 150–450)
PMV BLD AUTO: 10.1 FL (ref 8–12)
POTASSIUM BLD-SCNC: 3.5 MMOL/L (ref 3.5–5.1)
RBC # BLD AUTO: 3.59 10*6/MM3 (ref 3.77–5.16)
SODIUM BLD-SCNC: 140 MMOL/L (ref 137–145)
TIBC SERPL-MCNC: 473 MCG/DL (ref 265–497)
VIT B12 BLD-MCNC: 357 PG/ML (ref 239–931)
WBC NRBC COR # BLD: 6.83 10*3/MM3 (ref 3.2–9.8)

## 2018-06-20 PROCEDURE — 80048 BASIC METABOLIC PNL TOTAL CA: CPT | Performed by: INTERNAL MEDICINE

## 2018-06-20 PROCEDURE — P9016 RBC LEUKOCYTES REDUCED: HCPCS

## 2018-06-20 PROCEDURE — 86900 BLOOD TYPING SEROLOGIC ABO: CPT

## 2018-06-20 PROCEDURE — 36430 TRANSFUSION BLD/BLD COMPNT: CPT

## 2018-06-20 PROCEDURE — 85027 COMPLETE CBC AUTOMATED: CPT | Performed by: INTERNAL MEDICINE

## 2018-06-20 RX ADMIN — ROPINIROLE 1 MG: 1 TABLET ORAL at 20:18

## 2018-06-20 RX ADMIN — TRAZODONE HYDROCHLORIDE 50 MG: 50 TABLET ORAL at 20:19

## 2018-06-20 RX ADMIN — SOTALOL HYDROCHLORIDE 80 MG: 80 TABLET ORAL at 09:05

## 2018-06-20 RX ADMIN — LISINOPRIL 10 MG: 10 TABLET ORAL at 09:05

## 2018-06-20 RX ADMIN — GABAPENTIN 100 MG: 100 CAPSULE ORAL at 20:18

## 2018-06-20 RX ADMIN — FUROSEMIDE 20 MG: 20 TABLET ORAL at 17:39

## 2018-06-20 RX ADMIN — PANTOPRAZOLE SODIUM 40 MG: 40 TABLET, DELAYED RELEASE ORAL at 09:05

## 2018-06-20 RX ADMIN — GABAPENTIN 100 MG: 100 CAPSULE ORAL at 09:05

## 2018-06-20 RX ADMIN — FUROSEMIDE 20 MG: 20 TABLET ORAL at 09:05

## 2018-06-20 NOTE — H&P
History and Physical  Ernesto Sharif MD  Hospitalist    Date of admission: 6/19/2018    Patient Care Team:  Charles Everett MD as PCP - General    Chief complaint   Chief Complaint   Patient presents with   • Weakness - Generalized       Subjective     Patient is a 77 y.o. female admitted for generalized weakness, lack of energy, dyspnea, dizziness. She has been started on Plavix since January for her PVD - her symptoms have progressed until she was found to have a Hgb of 5.4 and was referred to the ER by her PCP.    She has had previous episodes of GI bleeding, severe enough to warrant PRBC transfusions. She denies noticing rika blood per rectum or melena.    History  Past Medical History:   Diagnosis Date   • Atherosclerosis    • Chronic blood loss anemia    • COPD (chronic obstructive pulmonary disease)    • Depression    • Emphysema of lung    • History of transfusion    • Hypertension    • Osteoporosis    • Paroxysmal atrial fibrillation    • PVD (peripheral vascular disease)    • Stroke      Past Surgical History:   Procedure Laterality Date   • APPENDECTOMY     • CEREBRAL ANEURYSM REPAIR     • CHOLECYSTECTOMY     • COLONOSCOPY N/A 5/9/2017   • ENDOSCOPY N/A 5/9/2017   • HYSTERECTOMY       Family History   Problem Relation Age of Onset   • Hypertension Father      Social History   Substance Use Topics   • Smoking status: Current Every Day Smoker     Packs/day: 1.00     Years: 60.00     Types: Cigarettes   • Smokeless tobacco: Never Used   • Alcohol use No     Prescriptions Prior to Admission   Medication Sig Dispense Refill Last Dose   • aspirin 81 MG chewable tablet Chew 81 mg Daily.   6/19/2018 at 0800   • clopidogrel (PLAVIX) 75 MG tablet Take 75 mg by mouth Daily.   6/19/2018 at 0800   • famotidine (PEPCID) 40 MG tablet Take 40 mg by mouth Daily.   6/19/2018 at 1900   • furosemide (LASIX) 20 MG tablet Take 1 tablet by mouth 3 (Three) Times a Week. Monday, Wednesday, Friday 12 tablet 6 6/19/2018 at 0800    • gabapentin (NEURONTIN) 100 MG capsule Take 100 mg by mouth 3 (Three) Times a Day.   6/19/2018 at 1900   • HYDROcodone-acetaminophen (NORCO) 5-325 MG per tablet Take 1 tablet by mouth 2 (Two) Times a Day As Needed.   6/19/2018 at 1900   • lisinopril (PRINIVIL,ZESTRIL) 10 MG tablet Take 10 mg by mouth Daily.   6/19/2018 at 1900   • Nutritional Supplements (ENSURE ACTIVE) liquid Take  by mouth.   6/19/2018 at Unknown time   • pantoprazole (PROTONIX) 40 MG EC tablet Take 40 mg by mouth Daily.   6/19/2018 at 0800   • rOPINIRole (REQUIP) 1 MG tablet Take 1 mg by mouth Every Night. Take 1 hour before bedtime.   6/19/2018 at 1900   • simvastatin (ZOCOR) 20 MG tablet Take 20 mg by mouth Every Night.   6/19/2018 at 1900   • sotalol (BETAPACE) 80 MG tablet Take 80 mg by mouth 2 (Two) Times a Day.   6/19/2018 at 1900   • traZODone (DESYREL) 50 MG tablet Take 50 mg by mouth Every Night.   6/19/2018 at 1900     Allergies:  Iodides and Penicillins    Review of Systems  Review of Systems   Constitutional: Positive for fatigue. Negative for fever.   Respiratory: Positive for shortness of breath. Negative for cough and choking.    Cardiovascular: Negative for chest pain, palpitations and leg swelling.   Gastrointestinal: Negative for abdominal distention, abdominal pain, anal bleeding, blood in stool, constipation, diarrhea, nausea and vomiting.   Genitourinary: Negative for dyspareunia, dysuria, frequency and urgency.   Musculoskeletal: Positive for arthralgias. Negative for back pain and gait problem.   Skin: Positive for pallor. Negative for color change and rash.   Neurological: Positive for dizziness, weakness, light-headedness and headaches. Negative for tremors, seizures and syncope.   Psychiatric/Behavioral: Negative for agitation, behavioral problems and confusion.   All other systems reviewed and are negative.      Objective     Vital Signs  Temp:  [96.6 °F (35.9 °C)-99 °F (37.2 °C)] 96.8 °F (36 °C)  Heart Rate:   [] 85  Resp:  [18-20] 18  BP: ()/(48-60) 133/60    Physical Exam:  Physical Exam   Constitutional: She is oriented to person, place, and time. She appears cachectic. She appears ill. No distress.   HENT:   Head: Normocephalic and atraumatic.   Eyes: EOM are normal. Pupils are equal, round, and reactive to light. No scleral icterus.   Neck: Normal range of motion. Neck supple.   Cardiovascular: Normal rate and regular rhythm.    Pulmonary/Chest: Effort normal and breath sounds normal. No respiratory distress. She has no wheezes.   Abdominal: Soft. Bowel sounds are normal. She exhibits no distension. There is no tenderness.   Musculoskeletal: She exhibits no edema or tenderness.   Neurological: She is alert and oriented to person, place, and time. No cranial nerve deficit. Coordination normal.   Skin: No rash noted. There is pallor.   Psychiatric: She has a normal mood and affect. Her behavior is normal.   Vitals reviewed.      Results Review:   Lab Results (last 24 hours)     Procedure Component Value Units Date/Time    Extra Tubes [[id number]] Collected:  06/19/18 2112    Specimen:  Blood from Blood, Venous Line Updated:  06/19/18 2215    Narrative:       The following orders were created for panel order Extra Tubes.  Procedure                               Abnormality         Status                     ---------                               -----------         ------                     Lavender Top[[id number]]                                     Final result               Gold Top - SST[[id number]]                                   Final result                 Please view results for these tests on the individual orders.    Lavender Top [[id number]] Collected:  06/19/18 2112    Specimen:  Blood Updated:  06/19/18 2215     Extra Tube hold for add-on     Comment: Auto resulted       Gold Top - SST [[id number]] Collected:  06/19/18 2112    Specimen:  Blood Updated:  06/19/18 2215     Extra Tube Hold for  add-ons.     Comment: Auto resulted.       CBC & Differential [[id number]] Collected:  06/19/18 2112    Specimen:  Blood Updated:  06/19/18 2212    Narrative:       The following orders were created for panel order CBC & Differential.  Procedure                               Abnormality         Status                     ---------                               -----------         ------                     Manual Differential[[id number]]          Abnormal            Final result               CBC Auto Differential[[id number]]        Abnormal            Final result                 Please view results for these tests on the individual orders.    Manual Differential [[id number]]  (Abnormal) Collected:  06/19/18 2112    Specimen:  Blood Updated:  06/19/18 2212     Neutrophil % 50.0 %      Lymphocyte % 32.0 %      Monocyte % 14.0 (H) %      Eosinophil % 2.0 %      Basophil % 1.0 %      Bands %  1.0 %      Neutrophils Absolute 3.96 10*3/mm3      Lymphocytes Absolute 2.48 10*3/mm3      Monocytes Absolute 1.09 (H) 10*3/mm3      Eosinophils Absolute 0.16 10*3/mm3      Basophils Absolute 0.08 10*3/mm3      Anisocytosis Mod/2+     Hypochromia Large/3+     Ovalocytes Slight/1+     Poikilocytes Mod/2+     Comment: WITH FEW EACH OF:  HELMET CELLS AND TEAR DROP CELLS         Polychromasia Slight/1+     Target Cells Slight/1+     WBC Morphology Normal     Platelet Estimate Adequate    Protime-INR [[id number]]  (Normal) Collected:  06/19/18 2112    Specimen:  Blood Updated:  06/19/18 2147     Protime 14.1 Seconds      INR 1.11    Narrative:       Therapeutic range for most indications is 2.0-3.0 INR,  or 2.5-3.5 for mechanical heart valves.    aPTT [[id number]]  (Normal) Collected:  06/19/18 2112    Specimen:  Blood Updated:  06/19/18 2147     PTT 26.9 seconds     Narrative:       The recommended Heparin therapeutic range is 68-97 seconds.    Basic Metabolic Panel [[id number]]  (Abnormal) Collected:  06/19/18 2112    Specimen:  Blood  Updated:  06/19/18 2134     Glucose 99 mg/dL      BUN 26 (H) mg/dL      Creatinine 1.13 (H) mg/dL      Sodium 140 mmol/L      Potassium 3.5 mmol/L      Chloride 101 mmol/L      CO2 30.0 mmol/L      Calcium 9.1 mg/dL      eGFR Non African Amer 47 mL/min/1.73      BUN/Creatinine Ratio 23.0     Anion Gap 9.0 mmol/L     Narrative:       The MDRD GFR formula is only valid for adults with stable renal function between ages 18 and 70.    Magnesium [[id number]]  (Normal) Collected:  06/19/18 2112    Specimen:  Blood Updated:  06/19/18 2134     Magnesium 2.2 mg/dL     Phosphorus [[id number]]  (Normal) Collected:  06/19/18 2112    Specimen:  Blood Updated:  06/19/18 2134     Phosphorus 3.8 mg/dL     CBC Auto Differential [[id number]]  (Abnormal) Collected:  06/19/18 2112    Specimen:  Blood Updated:  06/19/18 2134     WBC 7.76 10*3/mm3      RBC 3.01 (L) 10*6/mm3      Hemoglobin 5.4 (C) g/dL      Comment: Confirmed and called to dr. Sharif at 2100 Read back at 2100        Hematocrit 20.4 (L) %      MCV 67.8 (L) fL      MCH 17.9 (L) pg      MCHC 26.5 (L) g/dL      RDW 18.9 (H) %      RDW-SD 47.3 (H) fl      MPV 10.0 fL      Platelets 322 10*3/mm3           Imaging Results (last 24 hours)     ** No results found for the last 24 hours. **          Assessment/Plan     Principal Problem:    Chronic blood loss anemia  Active Problems:    Symptomatic anemia    COPD (chronic obstructive pulmonary disease)    Paroxysmal atrial fibrillation    Hypertension    Transfuse 2 u PRBCs, stop Plavix, follow up the H/H. Consult GI.    Ernesto Sharif MD  06/20/18  6:36 AM

## 2018-06-20 NOTE — ED NOTES
Attempt to call report, nurse unable to take report at this time     La Nena Cai, RN  06/19/18 2685

## 2018-06-20 NOTE — PROGRESS NOTES
[facility]  INPATIENT PROGRESS NOTE    Length of Stay: 1  Date of Admission: 6/19/2018  Primary Care Physician: Charles Everett MD    Subjective     Chief Complaint   Patient presents with   • Weakness - Generalized     HPI:  77-year-old female admitted for generalized weakness, leg allergic, dyspnea, dizziness.  She has been started on Plavix since January for her peripheral vascular disease, her symptoms have progressed until she was found to have hemoglobin of 5.4.  Patient was then referred from her PCP to ER.    6/20/18: Patient is doing better today.  Patient is status post transfusion 2 units doing very well.  She is feeling much better.  Further evaluation by GI is pending.    Review of Systems   Constitutional: Positive for activity change, appetite change and fatigue. Negative for chills and fever.   HENT: Negative for congestion and rhinorrhea.    Respiratory: Negative for chest tightness, shortness of breath and wheezing.    Cardiovascular: Negative for chest pain, palpitations and leg swelling.   Gastrointestinal: Negative for abdominal pain, anal bleeding, blood in stool, constipation, diarrhea, nausea and vomiting.   Genitourinary: Negative for dysuria, frequency and urgency.   Musculoskeletal: Negative for back pain.   Neurological: Negative for dizziness and light-headedness.   Hematological: Negative for adenopathy. Does not bruise/bleed easily.   Psychiatric/Behavioral: Negative for agitation, behavioral problems and confusion.        All pertinent negatives and positives are as above. All other systems have been reviewed and are negative unless otherwise stated.     Objective      Vital Sign Min/Max for last 24 hours  Temp  Min: 96.6 °F (35.9 °C)  Max: 99 °F (37.2 °C)   BP  Min: 93/49  Max: 152/68   Pulse  Min: 58  Max: 101   Resp  Min: 18  Max: 20   SpO2  Min: 90 %  Max: 100 %   Flow (L/min)  Min: 2  Max: 2   Weight  Min: 68.9 kg (152  lb)  Max: 68.9 kg (152 lb)         Physical Exam   Constitutional: She is oriented to person, place, and time. She appears well-developed and well-nourished.   HENT:   Head: Normocephalic and atraumatic.   Eyes: EOM are normal. Pupils are equal, round, and reactive to light.   Neck: Normal range of motion.   Cardiovascular: Normal rate, regular rhythm and normal heart sounds.    Pulmonary/Chest: Effort normal and breath sounds normal. No respiratory distress. She has no wheezes.   Abdominal: Soft. Bowel sounds are normal.   Musculoskeletal: Normal range of motion.   Neurological: She is alert and oriented to person, place, and time.   Skin: Skin is warm. Capillary refill takes less than 2 seconds.   Psychiatric: She has a normal mood and affect. Her behavior is normal.   Vitals reviewed.      Current Facility-Administered Medications   Medication Dose Route Frequency Provider Last Rate Last Dose   • furosemide (LASIX) tablet 20 mg  20 mg Oral BID Ernesto Sharif MD   20 mg at 06/20/18 0905   • gabapentin (NEURONTIN) capsule 100 mg  100 mg Oral Q12H Ernesto Sharif MD   100 mg at 06/20/18 0905   • HYDROcodone-acetaminophen (NORCO) 7.5-325 MG per tablet 1 tablet  1 tablet Oral BID PRN Ernesto Sharif MD       • lisinopril (PRINIVIL,ZESTRIL) tablet 10 mg  10 mg Oral Daily Ernesto Sharif MD   10 mg at 06/20/18 0905   • pantoprazole (PROTONIX) EC tablet 40 mg  40 mg Oral Daily Ernesto Sharif MD   40 mg at 06/20/18 0905   • rOPINIRole (REQUIP) tablet 1 mg  1 mg Oral Nightly Ernesto Sharif MD       • sodium chloride 0.9 % flush 1-10 mL  1-10 mL Intravenous PRN Ernesto Sharif MD       • sodium chloride 0.9 % flush 10 mL  10 mL Intravenous PRN Adam Garcia MD       • sotalol (BETAPACE) tablet 80 mg  80 mg Oral Q24H Ernesto Sharif MD   80 mg at 06/20/18 0905   • traZODone (DESYREL) tablet 50 mg  50 mg Oral Nightly Ernesto Sharif MD               Results Review:  I have reviewed the labs, radiology results, and  diagnostic studies.    Laboratory Data:     Results from last 7 days  Lab Units 06/20/18  0609 06/19/18 2112   SODIUM mmol/L 140 140   POTASSIUM mmol/L 3.5 3.5   CHLORIDE mmol/L 103 101   CO2 mmol/L 28.0 30.0   BUN mg/dL 23* 26*   CREATININE mg/dL 0.89 1.13*   GLUCOSE mg/dL 90 99   CALCIUM mg/dL 9.0 9.1   ANION GAP mmol/L 9.0 9.0     Estimated Creatinine Clearance: 56.7 mL/min (by C-G formula based on SCr of 0.89 mg/dL).    Results from last 7 days  Lab Units 06/19/18  2112   MAGNESIUM mg/dL 2.2   PHOSPHORUS mg/dL 3.8           Results from last 7 days  Lab Units 06/20/18  0609 06/19/18 2112   WBC 10*3/mm3 6.83 7.76   HEMOGLOBIN g/dL 7.8* 5.4*   HEMATOCRIT % 25.8* 20.4*   PLATELETS 10*3/mm3 234 322       Results from last 7 days  Lab Units 06/19/18 2112   INR  1.11           Culture Data:   No results found for: BLOODCX  No results found for: URINECX  No results found for: RESPCX  No results found for: WOUNDCX  No results found for: STOOLCX  No components found for: BODYFLD      Radiology Data:   Imaging Results (last 24 hours)     ** No results found for the last 24 hours. **          I have reviewed the patient current medications.     Assessment/Plan     Active Hospital Problems (** Indicates Principal Problem)    Diagnosis Date Noted   • **Chronic blood loss anemia [D50.0] 06/19/2018   • Symptomatic anemia [D64.9] 06/19/2018   • Paroxysmal atrial fibrillation [I48.0] 03/23/2017   • COPD (chronic obstructive pulmonary disease) [J44.9] 03/23/2017   • Hypertension [I10] 03/23/2017      Resolved Hospital Problems    Diagnosis Date Noted Date Resolved   No resolved problems to display.     1.  Chronic blood loss anemia: Patient was on aspirin and Plavix, there on hold.  She is status post transfusion 2 units.  Hemoglobin is 7.8 now.  Further GI evaluation is pending.  We'll contact patient vascular surgeon to determine whether she needs to stay on Plavix and aspirin or not.  2.  Paroxysmal atrial fibrillation:  Patient has been on sotalol and she has been in normal sinus rhythm.  She is not on any anticoagulation due to repeated GI bleeding.  3. COPD without exacerbation: Currently she appeared to be doing well and will continue with current therapy.  4.  Hypertension: Currently normotensive on lisinopril and continue to monitor.  5. JACQUES: resolved.     Discharge Planning: I expect patient to be discharged to home in 1-2 days.      DVT Prophylaxis: scd/teds               This document has been electronically signed by Robbin Spicer MD on June 20, 2018 2:34 PM

## 2018-06-20 NOTE — ED NOTES
2nd attempt to call report, nurse unable to take report at this time     La Nena Cai, RN  06/19/18 5874

## 2018-06-20 NOTE — ED PROVIDER NOTES
Subjective   History of Present Illness   69 atrial female presents to the ED with anemia.  Was at her primary care doctor's office today because of fatigue and weakness and was found to have a hemoglobin in the fives.  She has a history of GI bleeding and is followed by Dr. Dickson.  Reportedly she has multiple small areas to bleed they're not amenable to any therapy from GI standpoint.  She required transfusions in the past.  She had a DVT in her lower extremity and required a stent placement or some other vascular procedure in January of this year.  Since then she was started on Plavix.  She reportedly was told to avoid blood thinners but given her recent procedural stent placement she did require Plavix.  No syncope.  They have not noticed any dark tarry stools.  No vomiting of blood.  Review of Systems   Constitutional: Positive for fatigue. Negative for chills and fever.   HENT: Negative for rhinorrhea, sinus pressure and sneezing.    Eyes: Negative for pain and redness.   Respiratory: Negative for cough, chest tightness and shortness of breath.    Gastrointestinal: Positive for abdominal pain. Negative for diarrhea, nausea and vomiting.   Genitourinary: Negative for dysuria, flank pain, menstrual problem, pelvic pain, vaginal bleeding, vaginal discharge and vaginal pain.   Musculoskeletal: Negative for arthralgias, back pain and joint swelling.   Neurological: Negative for dizziness, syncope, weakness, numbness and headaches.   Hematological: Negative.    Psychiatric/Behavioral: Negative for self-injury and suicidal ideas.       Past Medical History:   Diagnosis Date   • A-fib    • Anemia    • COPD (chronic obstructive pulmonary disease)    • Emphysema of lung    • Hypertension    • Stroke        Allergies   Allergen Reactions   • Iodides Rash   • Penicillins Rash       Past Surgical History:   Procedure Laterality Date   • APPENDECTOMY     • BRAIN SURGERY      anuyrism   • CHOLECYSTECTOMY     • COLONOSCOPY  N/A 5/9/2017    Procedure: COLONOSCOPY;  Surgeon: Bhaskar Dickson DO;  Location: [facility] ENDOSCOPY;  Service:    • ENDOSCOPY N/A 5/9/2017    Procedure: ESOPHAGOGASTRODUODENOSCOPY;  Surgeon: Bhaskar Dickson DO;  Location: [facility] ENDOSCOPY;  Service:    • HYSTERECTOMY         History reviewed. No pertinent family history.    Social History     Social History   • Marital status:      Social History Main Topics   • Smoking status: Current Every Day Smoker     Packs/day: 1.00     Years: 60.00     Types: Cigarettes   • Smokeless tobacco: Never Used   • Alcohol use No   • Drug use: No   • Sexual activity: No     Other Topics Concern   • Not on file           Objective   Physical Exam   Constitutional: She is oriented to person, place, and time. She appears well-developed and well-nourished.   Pale   HENT:   Head: Normocephalic and atraumatic.   Eyes: Conjunctivae and EOM are normal. Pupils are equal, round, and reactive to light.   Pale sclera   Neck: Normal range of motion. Neck supple.   Cardiovascular: Normal rate, regular rhythm and normal heart sounds.    Pulmonary/Chest: Effort normal and breath sounds normal.   Abdominal: Soft. Bowel sounds are normal.   Genitourinary: Rectal exam shows guaiac positive stool.   Musculoskeletal: Normal range of motion.   Neurological: She is alert and oriented to person, place, and time.   Skin: Skin is warm and dry.   Psychiatric: She has a normal mood and affect.   Nursing note and vitals reviewed.      Procedures           ED Course                  MDM  Number of Diagnoses or Management Options  Diagnosis management comments: History of GI bleed with a hemoglobin in the fives.  Guaiac positive stool.  Likely slow persistent GI bleeds since January while being on Plavix.  We'll transfuse and admit the patient to the hospitalist service        Final diagnoses:   Symptomatic anemia   Other fatigue            Adam Garcia MD  06/19/18 9554

## 2018-06-20 NOTE — PLAN OF CARE
Problem: Patient Care Overview  Goal: Plan of Care Review  Outcome: Ongoing (interventions implemented as appropriate)   06/20/18 0442   Coping/Psychosocial   Plan of Care Reviewed With patient   Plan of Care Review   Progress no change   OTHER   Outcome Summary 2 units PRBC infused     Goal: Individualization and Mutuality  Outcome: Ongoing (interventions implemented as appropriate)    Goal: Discharge Needs Assessment  Outcome: Ongoing (interventions implemented as appropriate)      Problem: Fall Risk (Adult)  Goal: Identify Related Risk Factors and Signs and Symptoms  Outcome: Ongoing (interventions implemented as appropriate)    Goal: Absence of Fall  Outcome: Ongoing (interventions implemented as appropriate)      Problem: Skin Injury Risk (Adult)  Goal: Identify Related Risk Factors and Signs and Symptoms  Outcome: Ongoing (interventions implemented as appropriate)    Goal: Skin Health and Integrity  Outcome: Ongoing (interventions implemented as appropriate)

## 2018-06-21 VITALS
WEIGHT: 152 LBS | SYSTOLIC BLOOD PRESSURE: 140 MMHG | OXYGEN SATURATION: 92 % | RESPIRATION RATE: 18 BRPM | TEMPERATURE: 98.2 F | BODY MASS INDEX: 23.86 KG/M2 | DIASTOLIC BLOOD PRESSURE: 87 MMHG | HEIGHT: 67 IN | HEART RATE: 88 BPM

## 2018-06-21 LAB
ABO + RH BLD: NORMAL
ABO + RH BLD: NORMAL
ALBUMIN SERPL-MCNC: 3.8 G/DL (ref 3.4–4.8)
ALBUMIN/GLOB SERPL: 1.3 G/DL (ref 1.1–1.8)
ALP SERPL-CCNC: 66 U/L (ref 38–126)
ALT SERPL W P-5'-P-CCNC: 21 U/L (ref 9–52)
ANION GAP SERPL CALCULATED.3IONS-SCNC: 9 MMOL/L (ref 5–15)
AST SERPL-CCNC: 24 U/L (ref 14–36)
BASOPHILS # BLD AUTO: 0.15 10*3/MM3 (ref 0–0.2)
BASOPHILS NFR BLD AUTO: 1.9 % (ref 0–2)
BH BB BLOOD EXPIRATION DATE: NORMAL
BH BB BLOOD EXPIRATION DATE: NORMAL
BH BB BLOOD TYPE BARCODE: 6200
BH BB BLOOD TYPE BARCODE: 6200
BH BB DISPENSE STATUS: NORMAL
BH BB DISPENSE STATUS: NORMAL
BH BB PRODUCT CODE: NORMAL
BH BB PRODUCT CODE: NORMAL
BH BB UNIT NUMBER: NORMAL
BH BB UNIT NUMBER: NORMAL
BILIRUB SERPL-MCNC: 0.9 MG/DL (ref 0.2–1.3)
BUN BLD-MCNC: 11 MG/DL (ref 7–21)
BUN/CREAT SERPL: 14.3 (ref 7–25)
CALCIUM SPEC-SCNC: 9 MG/DL (ref 8.4–10.2)
CHLORIDE SERPL-SCNC: 99 MMOL/L (ref 95–110)
CO2 SERPL-SCNC: 32 MMOL/L (ref 22–31)
CREAT BLD-MCNC: 0.77 MG/DL (ref 0.5–1)
DEPRECATED RDW RBC AUTO: 56.3 FL (ref 36.4–46.3)
EOSINOPHIL # BLD AUTO: 0.26 10*3/MM3 (ref 0–0.7)
EOSINOPHIL NFR BLD AUTO: 3.2 % (ref 0–7)
ERYTHROCYTE [DISTWIDTH] IN BLOOD BY AUTOMATED COUNT: 20.8 % (ref 11.5–14.5)
GFR SERPL CREATININE-BSD FRML MDRD: 72 ML/MIN/1.73 (ref 39–90)
GLOBULIN UR ELPH-MCNC: 3 GM/DL (ref 2.3–3.5)
GLUCOSE BLD-MCNC: 78 MG/DL (ref 60–100)
HCT VFR BLD AUTO: 30 % (ref 35–45)
HGB BLD-MCNC: 8.7 G/DL (ref 12–15.5)
IMM GRANULOCYTES # BLD: 0.05 10*3/MM3 (ref 0–0.02)
IMM GRANULOCYTES NFR BLD: 0.6 % (ref 0–0.5)
LYMPHOCYTES # BLD AUTO: 1.2 10*3/MM3 (ref 0.6–4.2)
LYMPHOCYTES NFR BLD AUTO: 15 % (ref 10–50)
MCH RBC QN AUTO: 21.3 PG (ref 26.5–34)
MCHC RBC AUTO-ENTMCNC: 29 G/DL (ref 31.4–36)
MCV RBC AUTO: 73.3 FL (ref 80–98)
MONOCYTES # BLD AUTO: 0.95 10*3/MM3 (ref 0–0.9)
MONOCYTES NFR BLD AUTO: 11.9 % (ref 0–12)
NEUTROPHILS # BLD AUTO: 5.4 10*3/MM3 (ref 2–8.6)
NEUTROPHILS NFR BLD AUTO: 67.4 % (ref 37–80)
NRBC BLD MANUAL-RTO: 0 /100 WBC (ref 0–0)
PLATELET # BLD AUTO: 203 10*3/MM3 (ref 150–450)
PMV BLD AUTO: 10.1 FL (ref 8–12)
POTASSIUM BLD-SCNC: 3.3 MMOL/L (ref 3.5–5.1)
PROT SERPL-MCNC: 6.8 G/DL (ref 6.3–8.6)
RBC # BLD AUTO: 4.09 10*6/MM3 (ref 3.77–5.16)
SODIUM BLD-SCNC: 140 MMOL/L (ref 137–145)
UNIT  ABO: NORMAL
UNIT  ABO: NORMAL
UNIT  RH: NORMAL
UNIT  RH: NORMAL
WBC NRBC COR # BLD: 8.01 10*3/MM3 (ref 3.2–9.8)

## 2018-06-21 PROCEDURE — 85025 COMPLETE CBC W/AUTO DIFF WBC: CPT | Performed by: FAMILY MEDICINE

## 2018-06-21 PROCEDURE — 80053 COMPREHEN METABOLIC PANEL: CPT | Performed by: FAMILY MEDICINE

## 2018-06-21 PROCEDURE — 25010000002 NA FERRIC GLUC CPLX PER 12.5 MG: Performed by: FAMILY MEDICINE

## 2018-06-21 RX ORDER — PANTOPRAZOLE SODIUM 40 MG/1
40 TABLET, DELAYED RELEASE ORAL 2 TIMES DAILY
Qty: 30 TABLET | Refills: 0 | Status: SHIPPED | OUTPATIENT
Start: 2018-06-21

## 2018-06-21 RX ORDER — FERROUS SULFATE 325(65) MG
325 TABLET ORAL
Qty: 30 TABLET | Refills: 0 | Status: SHIPPED | OUTPATIENT
Start: 2018-06-21

## 2018-06-21 RX ORDER — SUCRALFATE 1 G/1
1 TABLET ORAL 4 TIMES DAILY
Qty: 40 TABLET | Refills: 0 | Status: SHIPPED | OUTPATIENT
Start: 2018-06-21

## 2018-06-21 RX ORDER — POTASSIUM CHLORIDE 750 MG/1
40 CAPSULE, EXTENDED RELEASE ORAL ONCE
Status: COMPLETED | OUTPATIENT
Start: 2018-06-21 | End: 2018-06-21

## 2018-06-21 RX ADMIN — GABAPENTIN 100 MG: 100 CAPSULE ORAL at 08:07

## 2018-06-21 RX ADMIN — SODIUM CHLORIDE 125 MG: 9 INJECTION, SOLUTION INTRAVENOUS at 09:32

## 2018-06-21 RX ADMIN — LISINOPRIL 10 MG: 10 TABLET ORAL at 08:07

## 2018-06-21 RX ADMIN — FUROSEMIDE 20 MG: 20 TABLET ORAL at 08:07

## 2018-06-21 RX ADMIN — POTASSIUM CHLORIDE 40 MEQ: 750 CAPSULE, EXTENDED RELEASE ORAL at 13:25

## 2018-06-21 RX ADMIN — SOTALOL HYDROCHLORIDE 80 MG: 80 TABLET ORAL at 08:07

## 2018-06-21 RX ADMIN — PANTOPRAZOLE SODIUM 40 MG: 40 TABLET, DELAYED RELEASE ORAL at 08:07

## 2018-06-21 NOTE — PAYOR COMM NOTE
"Sarah Layton (77 y.o. Female)     Date of Birth Social Security Number Address Home Phone MRN    1940  [address] [place] [address] [phone] [id number]    Taoist Marital Status          Protestant        Admission Date Admission Type Admitting Provider Attending Provider Department, Room/Bed    6/19/18 Emergency Ernesto Sharif MD Popescu, Tudor, MD [address], [address]    Discharge Date Discharge Disposition Discharge Destination                       Attending Provider:  Ernesto Sharif MD    Allergies:  Iodides, Penicillins    Isolation:  None   Infection:  None   Code Status:  CPR    Ht:  170.2 cm (67\")   Wt:  68.9 kg (152 lb)    Admission Cmt:  None   Principal Problem:  Chronic blood loss anemia [D50.0]                 Active Insurance as of 6/19/2018     Primary Coverage     Payor Plan Insurance Group Employer/Plan Group    AETNA MEDICARE REPLACEMENT AETNA [id number]     Payor Plan Address Payor Plan Phone Number Effective From Effective To    [address] [phone] 1/1/2017     [facility] [address]       Subscriber Name Subscriber Birth Date Member ID       SARAH LAYTON 1940 [id number]                 Emergency Contacts      (Rel.) Home Phone Work Phone Mobile Phone    Keli Hilario (Guardian) [phone] -- [phone]                  ICU Vital Signs     Row Name 06/21/18 0805 06/21/18 0750 06/21/18 0458 06/21/18 0052 06/20/18 2134       Vitals    Temp  --  -- --   pt refused vitals at this time  -- 97.2 °F (36.2 °C)    Temp src  --  --  --  -- Oral    Pulse  -- 104  -- 63 90    Heart Rate Source  -- Monitor  -- Monitor Monitor    Resp 18  --  --  -- 18    Resp Rate Source Visual  --  --  -- Visual    /72  --  --  -- 144/60    BP Location Left arm  --  --  -- Right arm    BP Method Automatic  --  --  -- Manual    Patient Position Lying  --  --  -- Sitting       Oxygen Therapy    SpO2 93 %  --  --  -- 94 %    " Device (Oxygen Therapy) room air  --  --  -- room air    Row Name 06/20/18 2049 06/20/18 2020 06/20/18 1544             Vitals    Temp  --  -- 97.5 °F (36.4 °C)      Pulse 89  -- 78      Heart Rate Source Monitor  --  --      Resp  --  -- 18      BP  --  -- 123/84      BP Location  --  -- Right arm      BP Method  --  -- Automatic      Patient Position  --  -- Lying         Oxygen Therapy    SpO2  --  -- 90 %      Pulse Oximetry Type  --  -- Intermittent      Device (Oxygen Therapy)  -- room air   Patient keeps removing nasal cannula room air          Lab Results (last 24 hours)     Procedure Component Value Units Date/Time    Comprehensive Metabolic Panel [[id number]]  (Abnormal) Collected:  06/21/18 1044    Specimen:  Blood Updated:  06/21/18 1109     Glucose 78 mg/dL      BUN 11 mg/dL      Creatinine 0.77 mg/dL      Sodium 140 mmol/L      Potassium 3.3 (L) mmol/L      Chloride 99 mmol/L      CO2 32.0 (H) mmol/L      Calcium 9.0 mg/dL      Total Protein 6.8 g/dL      Albumin 3.80 g/dL      ALT (SGPT) 21 U/L      AST (SGOT) 24 U/L      Alkaline Phosphatase 66 U/L      Total Bilirubin 0.9 mg/dL      eGFR Non African Amer 72 mL/min/1.73      Globulin 3.0 gm/dL      A/G Ratio 1.3 g/dL      BUN/Creatinine Ratio 14.3     Anion Gap 9.0 mmol/L     Narrative:       The MDRD GFR formula is only valid for adults with stable renal function between ages 18 and 70.    CBC & Differential [[id number]] Collected:  06/21/18 0917    Specimen:  Blood Updated:  06/21/18 0949    Narrative:       The following orders were created for panel order CBC & Differential.  Procedure                               Abnormality         Status                     ---------                               -----------         ------                     Scan Slide[[id number]]                                                                  CBC Auto Differential[[id number]]        Abnormal            Final result                 Please view results for these  tests on the individual orders.    CBC Auto Differential [[id number]]  (Abnormal) Collected:  06/21/18 0917    Specimen:  Blood Updated:  06/21/18 0949     WBC 8.01 10*3/mm3      RBC 4.09 10*6/mm3      Hemoglobin 8.7 (L) g/dL      Hematocrit 30.0 (L) %      MCV 73.3 (L) fL      MCH 21.3 (L) pg      MCHC 29.0 (L) g/dL      RDW 20.8 (H) %      RDW-SD 56.3 (H) fl      MPV 10.1 fL      Platelets 203 10*3/mm3      Neutrophil % 67.4 %      Lymphocyte % 15.0 %      Monocyte % 11.9 %      Eosinophil % 3.2 %      Basophil % 1.9 %      Immature Grans % 0.6 (H) %      Neutrophils, Absolute 5.40 10*3/mm3      Lymphocytes, Absolute 1.20 10*3/mm3      Monocytes, Absolute 0.95 (H) 10*3/mm3      Eosinophils, Absolute 0.26 10*3/mm3      Basophils, Absolute 0.15 10*3/mm3      Immature Grans, Absolute 0.05 (H) 10*3/mm3      nRBC 0.0 /100 WBC     Folate [[id number]]  (Normal) Collected:  06/19/18 2112    Specimen:  Blood Updated:  06/20/18 1614     Folate >20.00 ng/mL     Vitamin B12 [[id number]]  (Normal) Collected:  06/19/18 2112    Specimen:  Blood Updated:  06/20/18 1614     Vitamin B-12 357 pg/mL     Ferritin [[id number]]  (Abnormal) Collected:  06/19/18 2112    Specimen:  Blood Updated:  06/20/18 1544     Ferritin 4.20 (L) ng/mL     Iron Profile [[id number]]  (Abnormal) Collected:  06/19/18 2112    Specimen:  Blood Updated:  06/20/18 1515     Iron 11 (L) mcg/dL      TIBC 473 mcg/dL      Iron Saturation 2 (L) %         Clinical update labs and vitals  Pending auth # [id number]  JAIMEE Lyles RN, CM  [phone]

## 2018-06-21 NOTE — DISCHARGE SUMMARY
[address] [address]  T - [phone]     DISCHARGE SUMMARY         PATIENT  DEMOGRAPHICS   PATIENT NAME: Sarah Layton                      PHYSICIAN: Robbin Spicer MD  : 1940  MRN: [id number]    ADMISSION/DISCHARGE INFO   ADMISSION DATE: 2018   DISCHARGE DATE: 18    ADMISSION DIAGNOSES: Symptomatic anemia [D64.9]  Other fatigue [R53.83]  DISCHARGE DIAGNOSES:     Principal Problem:    Chronic blood loss anemia  Active Problems:    COPD (chronic obstructive pulmonary disease)    Paroxysmal atrial fibrillation    Hypertension    Symptomatic anemia      SERVICE:  Medicine       CONSULTS   Consult Orders (all)     Start     Ordered    18  Inpatient Gastroenterology Consult  Once     Specialty:  Gastroenterology  Provider:  Bhaskar Dickson DO    18          PROCEDURES     Imaging Results (last 24 hours)     ** No results found for the last 24 hours. **          No results found.    HISTORY OF PRESENT ILLNESS   Sarah Layton is a 77 y.o. female admitted for general weakness, leg of energy, dyspnea, dizziness.  She has been started on Plavix since January for her PVD, her symptom have progressively worsened until she was found to have hemoglobin of 5.4.  Patient was then referred from her PCP clinic to the ER.  Patient denied any episode of blood in her stool.  Patient does have a history of GI bleed in the past required multiple transfusions.    DIAGNOSTIC DATA   Labs  Images    HOSPITAL COURSE   Patient was admitted to the medical floor, she was given 2 units of packed red blood cells.  Patient tolerated the transfusion very well.  Her hemoglobin improved to 7.8 subsequently improved to 8.7.  GI was consulted for possible endoscopy.  Patient refused endoscopy.  I discussed the case with her daughter who is the guardian and they came to an agreement to have this done as outpatient on Monday.  I also called and  discussed the case with Dr. Grace who is the vascular surgeon who had done the stent and also broke.  He recommended for patient to have upper and lower endoscopy as well.  He stated the patient can be off of aspirin and Plavix for 2-3 weeks until she sees him in his clinic.  I discussed the case with Dr. Dickson he agree for patient to have endoscopy done next week and on outpatient basis.  Patient daughter understand that patient will need a follow-up labs as outpatient basis to ensure that she is not losing any further blood.  Patient will be given IV iron replacement here in the hospital, and will be started on PO iron supplement as out pt.      DISCHARGE CONDITION   Stable    DISPOSITION   To home with home health      DISCHARGE MEDICATIONS        Discharge Medications      New Medications      Instructions Start Date   ferrous sulfate 325 (65 FE) MG tablet   325 mg, Oral, Daily With Breakfast      sucralfate 1 g tablet  Commonly known as:  CARAFATE   1 g, Oral, 4 Times Daily         Changes to Medications      Instructions Start Date   pantoprazole 40 MG EC tablet  Commonly known as:  PROTONIX  What changed:  when to take this   40 mg, Oral, 2 Times Daily         Continue These Medications      Instructions Start Date   BETAPACE 80 MG tablet  Generic drug:  sotalol   80 mg, Oral, 2 Times Daily      ENSURE ACTIVE liquid   Oral      furosemide 20 MG tablet  Commonly known as:  LASIX   20 mg, Oral, 3 Times Weekly, Monday, Wednesday, Friday      gabapentin 100 MG capsule  Commonly known as:  NEURONTIN   100 mg, Oral, 3 Times Daily      HYDROcodone-acetaminophen 5-325 MG per tablet  Commonly known as:  NORCO   1 tablet, Oral, 2 Times Daily PRN      lisinopril 10 MG tablet  Commonly known as:  PRINIVIL,ZESTRIL   10 mg, Oral, Daily      rOPINIRole 1 MG tablet  Commonly known as:  REQUIP   1 mg, Oral, Nightly, Take 1 hour before bedtime.       simvastatin 20 MG tablet  Commonly known as:  ZOCOR   20 mg, Oral, Nightly       traZODone 50 MG tablet  Commonly known as:  DESYREL   50 mg, Oral, Nightly         Stop These Medications    aspirin 81 MG chewable tablet     clopidogrel 75 MG tablet  Commonly known as:  PLAVIX     famotidine 40 MG tablet  Commonly known as:  PEPCID              INSTRUCTIONS   Activity:   Activity Instructions     Discharge Activity       As tolerated          Diet:   Diet Instructions     Diet: Regular, Cardiac       Discharge Diet:   Regular  Cardiac             Special Instructions: Patient instructed to call M.D. or return to ED with worsening shortness of breath, chest pain, fever greater than 100.4°F or any other medical concerns.    FOLLOW UP   Follow-up Information     Charles Everett MD Follow up.    Specialty:  Family Medicine  Contact information:  [address] [facility] [address] [phone]             Bhaskar Dickson DO Follow up on 6/25/2018.    Specialty:  Gastroenterology  Contact information:  [address] [address] [phone]             Bhaskar Grace Jr., MD Follow up in 3 week(s).    Specialty:  General Surgery  Contact information:  [address] [facility] [address]  [phone]                  PENDING TEST RESULTS AT DISCHARGE      TIME   Time: 30 minutes were spent planning this discharge.                      This document has been electronically signed by Robbin Spicer MD on June 21, 2018 4:41 PM

## 2018-06-21 NOTE — PLAN OF CARE
Problem: Patient Care Overview  Goal: Plan of Care Review  Outcome: Ongoing (interventions implemented as appropriate)   06/21/18 0305   Coping/Psychosocial   Plan of Care Reviewed With patient   Plan of Care Review   Progress no change   OTHER   Outcome Summary Patient VSS, no complaints of pain, refuses bed alarm, no changes     Goal: Individualization and Mutuality  Outcome: Ongoing (interventions implemented as appropriate)    Goal: Discharge Needs Assessment  Outcome: Ongoing (interventions implemented as appropriate)      Problem: Fall Risk (Adult)  Goal: Identify Related Risk Factors and Signs and Symptoms  Outcome: Ongoing (interventions implemented as appropriate)    Goal: Absence of Fall  Outcome: Ongoing (interventions implemented as appropriate)      Problem: Skin Injury Risk (Adult)  Goal: Identify Related Risk Factors and Signs and Symptoms  Outcome: Ongoing (interventions implemented as appropriate)    Goal: Skin Health and Integrity  Outcome: Ongoing (interventions implemented as appropriate)

## 2018-06-21 NOTE — PROGRESS NOTES
[facility] Medicine Services  INPATIENT PROGRESS NOTE    Length of Stay: 2  Date of Admission: 6/19/2018  Primary Care Physician: Charles Everett MD    Subjective     Chief Complaint   Patient presents with   • Weakness - Generalized     HPI:  77-year-old female admitted for generalized weakness, leg allergic, dyspnea, dizziness.  She has been started on Plavix since January for her peripheral vascular disease, her symptoms have progressed until she was found to have hemoglobin of 5.4.  Patient was then referred from her PCP to ER.    6/20/18: Patient is doing better today.  Patient is status post transfusion 2 units doing very well.  She is feeling much better.  Further evaluation by GI is pending.    6/21/18: Patient is doing better today.  She was evaluated by gastroenterology for possible scope however patient declined.  We'll have further discussion with daughter when she comes in.  Patient iron study showed iron deficiency anemia, patient has been started on IV infusion of iron.    Review of Systems   Constitutional: Positive for activity change, appetite change and fatigue. Negative for chills and fever.   HENT: Negative for congestion and rhinorrhea.    Respiratory: Negative for chest tightness, shortness of breath and wheezing.    Cardiovascular: Negative for chest pain, palpitations and leg swelling.   Gastrointestinal: Negative for abdominal pain, anal bleeding, blood in stool, constipation, diarrhea, nausea and vomiting.   Genitourinary: Negative for dysuria, frequency and urgency.   Musculoskeletal: Negative for back pain.   Neurological: Negative for dizziness and light-headedness.   Hematological: Negative for adenopathy. Does not bruise/bleed easily.   Psychiatric/Behavioral: Negative for agitation, behavioral problems and confusion.        All pertinent negatives and positives are as above. All other systems have been reviewed and are negative unless  otherwise stated.     Objective      Vital Sign Min/Max for last 24 hours  Temp  Min: 97.2 °F (36.2 °C)  Max: 98.2 °F (36.8 °C)   BP  Min: 121/72  Max: 144/60   Pulse  Min: 63  Max: 104   Resp  Min: 18  Max: 18   SpO2  Min: 90 %  Max: 94 %   No Data Recorded   No Data Recorded         Physical Exam   Constitutional: She is oriented to person, place, and time. She appears well-developed and well-nourished.   HENT:   Head: Normocephalic and atraumatic.   Eyes: EOM are normal. Pupils are equal, round, and reactive to light.   Neck: Normal range of motion.   Cardiovascular: Normal rate, regular rhythm and normal heart sounds.    Pulmonary/Chest: Effort normal and breath sounds normal. No respiratory distress. She has no wheezes.   Abdominal: Soft. Bowel sounds are normal.   Musculoskeletal: Normal range of motion.   Neurological: She is alert and oriented to person, place, and time.   Skin: Skin is warm. Capillary refill takes less than 2 seconds.   Psychiatric: She has a normal mood and affect. Her behavior is normal.   Vitals reviewed.      Current Facility-Administered Medications   Medication Dose Route Frequency Provider Last Rate Last Dose   • furosemide (LASIX) tablet 20 mg  20 mg Oral BID Ernesto Sharif MD   20 mg at 06/21/18 0807   • gabapentin (NEURONTIN) capsule 100 mg  100 mg Oral Q12H Ernesto Sharif MD   100 mg at 06/21/18 0807   • HYDROcodone-acetaminophen (NORCO) 7.5-325 MG per tablet 1 tablet  1 tablet Oral BID PRN Ernesto Sharif MD       • lisinopril (PRINIVIL,ZESTRIL) tablet 10 mg  10 mg Oral Daily Ernesto Sharif MD   10 mg at 06/21/18 0807   • pantoprazole (PROTONIX) EC tablet 40 mg  40 mg Oral Daily Ernesto Sharif MD   40 mg at 06/21/18 0807   • rOPINIRole (REQUIP) tablet 1 mg  1 mg Oral Nightly Ernesto Sharif MD   1 mg at 06/20/18 2018   • sodium chloride 0.9 % flush 1-10 mL  1-10 mL Intravenous PRN Ernesto Sharif MD       • sodium chloride 0.9 % flush 10 mL  10 mL Intravenous PRN Adam Panchal  MD Jose       • sotalol (BETAPACE) tablet 80 mg  80 mg Oral Q24H Ernesto Sharif MD   80 mg at 06/21/18 0807   • traZODone (DESYREL) tablet 50 mg  50 mg Oral Nightly Ernesto Sharif MD   50 mg at 06/20/18 2019           Results Review:  I have reviewed the labs, radiology results, and diagnostic studies.    Laboratory Data:     Results from last 7 days  Lab Units 06/21/18  1044 06/20/18  0609 06/19/18 2112   SODIUM mmol/L 140 140 140   POTASSIUM mmol/L 3.3* 3.5 3.5   CHLORIDE mmol/L 99 103 101   CO2 mmol/L 32.0* 28.0 30.0   BUN mg/dL 11 23* 26*   CREATININE mg/dL 0.77 0.89 1.13*   GLUCOSE mg/dL 78 90 99   CALCIUM mg/dL 9.0 9.0 9.1   BILIRUBIN mg/dL 0.9  --   --    ALK PHOS U/L 66  --   --    ALT (SGPT) U/L 21  --   --    AST (SGOT) U/L 24  --   --    ANION GAP mmol/L 9.0 9.0 9.0     Estimated Creatinine Clearance: 63 mL/min (by C-G formula based on SCr of 0.77 mg/dL).    Results from last 7 days  Lab Units 06/19/18 2112   MAGNESIUM mg/dL 2.2   PHOSPHORUS mg/dL 3.8           Results from last 7 days  Lab Units 06/21/18  0917 06/20/18  0609 06/19/18 2112   WBC 10*3/mm3 8.01 6.83 7.76   HEMOGLOBIN g/dL 8.7* 7.8* 5.4*   HEMATOCRIT % 30.0* 25.8* 20.4*   PLATELETS 10*3/mm3 203 234 322       Results from last 7 days  Lab Units 06/19/18  2112   INR  1.11           Culture Data:   No results found for: BLOODCX  No results found for: URINECX  No results found for: RESPCX  No results found for: WOUNDCX  No results found for: STOOLCX  No components found for: BODYFLD      Radiology Data:   Imaging Results (last 24 hours)     ** No results found for the last 24 hours. **          I have reviewed the patient current medications.     Assessment/Plan     Active Hospital Problems (** Indicates Principal Problem)    Diagnosis Date Noted   • **Chronic blood loss anemia [D50.0] 06/19/2018   • Symptomatic anemia [D64.9] 06/19/2018   • Paroxysmal atrial fibrillation [I48.0] 03/23/2017   • COPD (chronic obstructive pulmonary disease)  [J44.9] 03/23/2017   • Hypertension [I10] 03/23/2017      Resolved Hospital Problems    Diagnosis Date Noted Date Resolved   No resolved problems to display.     1.  Chronic blood loss anemia: Patient was on aspirin and Plavix, there on hold.  She is status post transfusion 2 units.  Hemoglobin is 8.7 now.  Have discussed with Dr. Grace who had done the vascular system stenting for the patient recommended for her to be on aspirin and Plavix indefinitely.  However he recommended for patient to consider having upper and lower scope.  He said we can hold off on aspirin times for 2-3 weeks, patient will follow-up with his clinic and then she will be started on aspirin and Plavix at that time.  2.  Paroxysmal atrial fibrillation: Patient has been on sotalol and she has been in normal sinus rhythm.  She is not on any anticoagulation due to repeated GI bleeding.  3. COPD without exacerbation: Currently she appeared to be doing well and will continue with current therapy.  4.  Hypertension: Currently normotensive on lisinopril and continue to monitor.  5. JACQUES: resolved.     Discharge Planning: I expect patient to be discharged to home in 1-2 days.      DVT Prophylaxis: scd/teds               This document has been electronically signed by Robbin Spicer MD on June 21, 2018 2:14 PM

## 2018-06-21 NOTE — DISCHARGE INSTR - APPOINTMENTS
DR BROWN June 28, 2018 AT 1:00 pm  DR. HART June 25, 2018 AT 8:00 am- nothing to eat or drink after midnight the night before appointment.   DR. ORTIZ July 10, 2018 AT 9:00 am

## 2018-06-21 NOTE — CONSULTS
Ramiro Hart DO,[facility]  Gastroenterology  Hepatology  Endoscopy  Board Certified in Internal Medicine and gastroenterology  [address], [address]  [place]. [address]  - [phone]   F - [phone]     GASTROENTEROLOGY CONSULT NOTE   RAMIRO HART DO.         SUBJECTIVE:   6/20/2018    Name: Sarah Layton  DOD: 1940    REASON FOR CONSULT: Anemia with chronic blood loss    Chief Complaint:     Chief Complaint   Patient presents with   • Weakness - Generalized       Subjective : Weakness with finding of severe anemia    Patient is 77 y.o. 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  The patient has been on Plavix.  Denies any abdominal pain.  No nausea vomiting.  No vomiting of blood or passage of blood through the stools.    Upon entering the room, she says that she does not want to take any type of treatments regarding endoscopy.    She is anxious for discharge from the hospital.     ROS/HISTORY/ CURRENT MEDICATIONS/OBJECTIVE/VS/PE:   Review of Systems:   Review of Systems   Constitutional: Positive for activity change, appetite change and fatigue.   HENT: Negative.    Eyes: Negative.    Respiratory: Positive for shortness of breath.    Cardiovascular: Negative.    Gastrointestinal: Negative.    Endocrine: Negative.    Genitourinary: Negative.    Musculoskeletal: Positive for arthralgias and back pain.   Skin: Positive for color change and pallor.   Allergic/Immunologic: Negative.    Neurological: Positive for weakness and light-headedness.   Hematological: Bruises/bleeds easily.   Psychiatric/Behavioral: The patient is nervous/anxious.        History:     Past Medical History:   Diagnosis Date   • Atherosclerosis    • Chronic blood loss anemia    • COPD (chronic obstructive pulmonary disease)    • Depression    • Emphysema of lung    • History of transfusion    •  Hypertension    • Osteoporosis    • Paroxysmal atrial fibrillation    • PVD (peripheral vascular disease)    • Stroke      Past Surgical History:   Procedure Laterality Date   • APPENDECTOMY     • CEREBRAL ANEURYSM REPAIR     • CHOLECYSTECTOMY     • COLONOSCOPY N/A 5/9/2017   • ENDOSCOPY N/A 5/9/2017   • HYSTERECTOMY       Family History   Problem Relation Age of Onset   • Hypertension Father      Social History   Substance Use Topics   • Smoking status: Current Every Day Smoker     Packs/day: 1.00     Years: 60.00     Types: Cigarettes   • Smokeless tobacco: Never Used   • Alcohol use No     Prescriptions Prior to Admission   Medication Sig Dispense Refill Last Dose   • aspirin 81 MG chewable tablet Chew 81 mg Daily.   6/19/2018 at 0800   • clopidogrel (PLAVIX) 75 MG tablet Take 75 mg by mouth Daily.   6/19/2018 at 0800   • famotidine (PEPCID) 40 MG tablet Take 40 mg by mouth Daily.   6/19/2018 at 1900   • furosemide (LASIX) 20 MG tablet Take 1 tablet by mouth 3 (Three) Times a Week. Monday, Wednesday, Friday 12 tablet 6 6/19/2018 at 0800   • gabapentin (NEURONTIN) 100 MG capsule Take 100 mg by mouth 3 (Three) Times a Day.   6/19/2018 at 1900   • HYDROcodone-acetaminophen (NORCO) 5-325 MG per tablet Take 1 tablet by mouth 2 (Two) Times a Day As Needed.   6/19/2018 at 1900   • lisinopril (PRINIVIL,ZESTRIL) 10 MG tablet Take 10 mg by mouth Daily.   6/19/2018 at 1900   • Nutritional Supplements (ENSURE ACTIVE) liquid Take  by mouth.   6/19/2018 at Unknown time   • pantoprazole (PROTONIX) 40 MG EC tablet Take 40 mg by mouth Daily.   6/19/2018 at 0800   • rOPINIRole (REQUIP) 1 MG tablet Take 1 mg by mouth Every Night. Take 1 hour before bedtime.   6/19/2018 at 1900   • simvastatin (ZOCOR) 20 MG tablet Take 20 mg by mouth Every Night.   6/19/2018 at 1900   • sotalol (BETAPACE) 80 MG tablet Take 80 mg by mouth 2 (Two) Times a Day.   6/19/2018 at 1900   • traZODone (DESYREL) 50 MG tablet Take 50 mg by mouth Every Night.    6/19/2018 at 1900     Allergies:  Iodides and Penicillins    I have reviewed the patients medical history, surgical history and family history in the available medical record system.     Current Medications:     Current Facility-Administered Medications   Medication Dose Route Frequency Provider Last Rate Last Dose   • furosemide (LASIX) tablet 20 mg  20 mg Oral BID Ernesto Sharif MD   20 mg at 06/20/18 1739   • gabapentin (NEURONTIN) capsule 100 mg  100 mg Oral Q12H Ernesto Sharif MD   100 mg at 06/20/18 2018   • HYDROcodone-acetaminophen (NORCO) 7.5-325 MG per tablet 1 tablet  1 tablet Oral BID PRN Ernesto Sharif MD       • lisinopril (PRINIVIL,ZESTRIL) tablet 10 mg  10 mg Oral Daily Ernesto Sharif MD   10 mg at 06/20/18 0905   • pantoprazole (PROTONIX) EC tablet 40 mg  40 mg Oral Daily Ernesto Sharif MD   40 mg at 06/20/18 0905   • rOPINIRole (REQUIP) tablet 1 mg  1 mg Oral Nightly Ernesto Sharif MD   1 mg at 06/20/18 2018   • sodium chloride 0.9 % flush 1-10 mL  1-10 mL Intravenous PRN Ernesto Sharif MD       • sodium chloride 0.9 % flush 10 mL  10 mL Intravenous PRN Adam Garcia MD       • sotalol (BETAPACE) tablet 80 mg  80 mg Oral Q24H Ernesto Sharif MD   80 mg at 06/20/18 0905   • traZODone (DESYREL) tablet 50 mg  50 mg Oral Nightly Ernesto Sharif MD   50 mg at 06/20/18 2019       Objective     Physical Exam:   Temp:  [96.6 °F (35.9 °C)-97.7 °F (36.5 °C)] 97.5 °F (36.4 °C)  Heart Rate:  [] 89  Resp:  [18-20] 18  BP: ()/(48-84) 123/84    Physical Exam:  General Appearance:    Alert, cooperative, in no acute distress   Head:    Normocephalic, without obvious abnormality, atraumatic   Eyes:            Lids and lashes normal, conjunctivae and sclerae normal, no   icterus, no pallor, corneas clear, PERRLA   Ears:    Ears appear intact with no abnormalities noted   Throat:   No oral lesions, no thrush, oral mucosa moist   Neck:   No adenopathy, supple, trachea midline, no thyromegaly, no      carotid bruit, no JVD   Back:     No kyphosis present, no scoliosis present, no skin lesions,       erythema or scars, no tenderness to percussion or                   palpation,   range of motion normal   Lungs:     Clear to auscultation,respirations regular, even and                   unlabored    Heart:    Regular rhythm and normal rate, normal S1 and S2, no            murmur, no gallop, no rub, no click   Breast Exam:    Deferred   Abdomen:     Normal bowel sounds, no masses, no organomegaly, soft        non-tender, non-distended, no guarding, no rebound                 tenderness   Genitalia:    Deferred   Extremities:   Moves all extremities well, no edema, no cyanosis, no              redness   Pulses:   Pulses palpable and equal bilaterally   Skin:   No bleeding, bruising or rash   Lymph nodes:   No palpable adenopathy   Neurologic:   Cranial nerves 2 - 12 grossly intact, sensation intact, DTR        present and equal bilaterally      Results Review:     Lab Results   Component Value Date    WBC 6.83 06/20/2018    WBC 7.76 06/19/2018    WBC 3.38 03/24/2017    HGB 7.8 (L) 06/20/2018    HGB 5.4 (C) 06/19/2018    HGB 10.9 (L) 03/24/2017    HCT 25.8 (L) 06/20/2018    HCT 20.4 (L) 06/19/2018    HCT 32.5 (L) 03/24/2017     06/20/2018     06/19/2018     03/24/2017             No results found for: LIPASE  Lab Results   Component Value Date    INR 1.11 06/19/2018    INR 1.06 03/22/2017    INR 1.01 03/22/2017          Radiology Review:  Imaging Results (last 72 hours)     ** No results found for the last 72 hours. **           I reviewed the patient's new clinical results.  I reviewed the patient's new imaging results and agree with the interpretation.     ASSESSMENT/PLAN:   ASSESSMENT:   1.  Anemia secondary to chronic blood loss, secondary to vascular malformations of the gastrointestinal system most likely    PLAN:   1.  No endoscopic evaluation is to be done as per the patient's request  2.   I would recommend evaluation of the aspirin and Plavix and the necessity of taking this.  If you continue to give the patient aspirin or Plavix I would recommend at least CBCs on a monthly basis.  3.  Continue iron therapy, probably lifelong  4.  If I can be of any further assistance, please do not hesitate to call.       Bhaskar Dickson,   06/20/18  9:04 PM

## 2018-06-21 NOTE — PAYOR COMM NOTE
"Sarah Layton (77 y.o. Female)     Date of Birth Social Security Number Address Home Phone MRN    1940  [address] [place] [address] [phone] [id number]    Taoism Marital Status          Scientologist        Admission Date Admission Type Admitting Provider Attending Provider Department, Room/Bed    6/19/18 Emergency Ernesto Sharif MD Popescu, Tudor, MD [facility] 3 [facility], 380/1    Discharge Date Discharge Disposition Discharge Destination                       Attending Provider:  Ernesto Sharif MD    Allergies:  Iodides, Penicillins    Isolation:  None   Infection:  None   Code Status:  CPR    Ht:  170.2 cm (67\")   Wt:  68.9 kg (152 lb)    Admission Cmt:  None   Principal Problem:  Chronic blood loss anemia [D50.0]                 Active Insurance as of 6/19/2018     Primary Coverage     Payor Plan Insurance Group Employer/Plan Group    AETNA MEDICARE REPLACEMENT AETNA [id number]     Payor Plan Address Payor Plan Phone Number Effective From Effective To    [address] [phone] 1/1/2017     [facility] [id number]       Subscriber Name Subscriber Birth Date Member ID       SARAH LAYTON 1940 [id number]                 Emergency Contacts      (Rel.) Home Phone Work Phone Mobile Phone    Keli Hilario (Guardian) [phone] -- [phone]        [facility]  P: [phone]  F: [phone]       History & Physical      Ernesto Sharif MD at 6/19/2018  9:36 PM          History and Physical  Ernesto Sharif MD  Hospitalist    Date of admission: 6/19/2018    Patient Care Team:  Charles Everett MD as PCP - General    Chief complaint   Chief Complaint   Patient presents with   • Weakness - Generalized       Subjective     Patient is a 77 y.o. female admitted for generalized weakness, lack of energy, dyspnea, dizziness. She has been started on Plavix since January for her PVD - her symptoms have progressed " until she was found to have a Hgb of 5.4 and was referred to the ER by her PCP.    She has had previous episodes of GI bleeding, severe enough to warrant PRBC transfusions. She denies noticing rika blood per rectum or melena.    History  Past Medical History:   Diagnosis Date   • Atherosclerosis    • Chronic blood loss anemia    • COPD (chronic obstructive pulmonary disease)    • Depression    • Emphysema of lung    • History of transfusion    • Hypertension    • Osteoporosis    • Paroxysmal atrial fibrillation    • PVD (peripheral vascular disease)    • Stroke      Past Surgical History:   Procedure Laterality Date   • APPENDECTOMY     • CEREBRAL ANEURYSM REPAIR     • CHOLECYSTECTOMY     • COLONOSCOPY N/A 5/9/2017   • ENDOSCOPY N/A 5/9/2017   • HYSTERECTOMY       Family History   Problem Relation Age of Onset   • Hypertension Father      Social History   Substance Use Topics   • Smoking status: Current Every Day Smoker     Packs/day: 1.00     Years: 60.00     Types: Cigarettes   • Smokeless tobacco: Never Used   • Alcohol use No     Prescriptions Prior to Admission   Medication Sig Dispense Refill Last Dose   • aspirin 81 MG chewable tablet Chew 81 mg Daily.   6/19/2018 at 0800   • clopidogrel (PLAVIX) 75 MG tablet Take 75 mg by mouth Daily.   6/19/2018 at 0800   • famotidine (PEPCID) 40 MG tablet Take 40 mg by mouth Daily.   6/19/2018 at 1900   • furosemide (LASIX) 20 MG tablet Take 1 tablet by mouth 3 (Three) Times a Week. Monday, Wednesday, Friday 12 tablet 6 6/19/2018 at 0800   • gabapentin (NEURONTIN) 100 MG capsule Take 100 mg by mouth 3 (Three) Times a Day.   6/19/2018 at 1900   • HYDROcodone-acetaminophen (NORCO) 5-325 MG per tablet Take 1 tablet by mouth 2 (Two) Times a Day As Needed.   6/19/2018 at 1900   • lisinopril (PRINIVIL,ZESTRIL) 10 MG tablet Take 10 mg by mouth Daily.   6/19/2018 at 1900   • Nutritional Supplements (ENSURE ACTIVE) liquid Take  by mouth.   6/19/2018 at Unknown time   •  pantoprazole (PROTONIX) 40 MG EC tablet Take 40 mg by mouth Daily.   6/19/2018 at 0800   • rOPINIRole (REQUIP) 1 MG tablet Take 1 mg by mouth Every Night. Take 1 hour before bedtime.   6/19/2018 at 1900   • simvastatin (ZOCOR) 20 MG tablet Take 20 mg by mouth Every Night.   6/19/2018 at 1900   • sotalol (BETAPACE) 80 MG tablet Take 80 mg by mouth 2 (Two) Times a Day.   6/19/2018 at 1900   • traZODone (DESYREL) 50 MG tablet Take 50 mg by mouth Every Night.   6/19/2018 at 1900     Allergies:  Iodides and Penicillins    Review of Systems  Review of Systems   Constitutional: Positive for fatigue. Negative for fever.   Respiratory: Positive for shortness of breath. Negative for cough and choking.    Cardiovascular: Negative for chest pain, palpitations and leg swelling.   Gastrointestinal: Negative for abdominal distention, abdominal pain, anal bleeding, blood in stool, constipation, diarrhea, nausea and vomiting.   Genitourinary: Negative for dyspareunia, dysuria, frequency and urgency.   Musculoskeletal: Positive for arthralgias. Negative for back pain and gait problem.   Skin: Positive for pallor. Negative for color change and rash.   Neurological: Positive for dizziness, weakness, light-headedness and headaches. Negative for tremors, seizures and syncope.   Psychiatric/Behavioral: Negative for agitation, behavioral problems and confusion.   All other systems reviewed and are negative.      Objective     Vital Signs  Temp:  [96.6 °F (35.9 °C)-99 °F (37.2 °C)] 96.8 °F (36 °C)  Heart Rate:  [] 85  Resp:  [18-20] 18  BP: ()/(48-60) 133/60    Physical Exam:  Physical Exam   Constitutional: She is oriented to person, place, and time. She appears cachectic. She appears ill. No distress.   HENT:   Head: Normocephalic and atraumatic.   Eyes: EOM are normal. Pupils are equal, round, and reactive to light. No scleral icterus.   Neck: Normal range of motion. Neck supple.   Cardiovascular: Normal rate and regular  rhythm.    Pulmonary/Chest: Effort normal and breath sounds normal. No respiratory distress. She has no wheezes.   Abdominal: Soft. Bowel sounds are normal. She exhibits no distension. There is no tenderness.   Musculoskeletal: She exhibits no edema or tenderness.   Neurological: She is alert and oriented to person, place, and time. No cranial nerve deficit. Coordination normal.   Skin: No rash noted. There is pallor.   Psychiatric: She has a normal mood and affect. Her behavior is normal.   Vitals reviewed.      Results Review:   Lab Results (last 24 hours)     Procedure Component Value Units Date/Time    Extra Tubes [[id number]] Collected:  06/19/18 2112    Specimen:  Blood from Blood, Venous Line Updated:  06/19/18 2215    Narrative:       The following orders were created for panel order Extra Tubes.  Procedure                               Abnormality         Status                     ---------                               -----------         ------                     Lavender Top[[id number]]                                     Final result               Gold Top - SST[[id number]]                                   Final result                 Please view results for these tests on the individual orders.    Lavender Top [[id number]] Collected:  06/19/18 2112    Specimen:  Blood Updated:  06/19/18 2215     Extra Tube hold for add-on     Comment: Auto resulted       Gold Top - SST [[id number]] Collected:  06/19/18 2112    Specimen:  Blood Updated:  06/19/18 2215     Extra Tube Hold for add-ons.     Comment: Auto resulted.       CBC & Differential [[id number]] Collected:  06/19/18 2112    Specimen:  Blood Updated:  06/19/18 2212    Narrative:       The following orders were created for panel order CBC & Differential.  Procedure                               Abnormality         Status                     ---------                               -----------         ------                     Manual  Differential[[id number]]          Abnormal            Final result               CBC Auto Differential[[id number]]        Abnormal            Final result                 Please view results for these tests on the individual orders.    Manual Differential [[id number]]  (Abnormal) Collected:  06/19/18 2112    Specimen:  Blood Updated:  06/19/18 2212     Neutrophil % 50.0 %      Lymphocyte % 32.0 %      Monocyte % 14.0 (H) %      Eosinophil % 2.0 %      Basophil % 1.0 %      Bands %  1.0 %      Neutrophils Absolute 3.96 10*3/mm3      Lymphocytes Absolute 2.48 10*3/mm3      Monocytes Absolute 1.09 (H) 10*3/mm3      Eosinophils Absolute 0.16 10*3/mm3      Basophils Absolute 0.08 10*3/mm3      Anisocytosis Mod/2+     Hypochromia Large/3+     Ovalocytes Slight/1+     Poikilocytes Mod/2+     Comment: WITH FEW EACH OF:  HELMET CELLS AND TEAR DROP CELLS         Polychromasia Slight/1+     Target Cells Slight/1+     WBC Morphology Normal     Platelet Estimate Adequate    Protime-INR [[id number]]  (Normal) Collected:  06/19/18 2112    Specimen:  Blood Updated:  06/19/18 2147     Protime 14.1 Seconds      INR 1.11    Narrative:       Therapeutic range for most indications is 2.0-3.0 INR,  or 2.5-3.5 for mechanical heart valves.    aPTT [[id number]]  (Normal) Collected:  06/19/18 2112    Specimen:  Blood Updated:  06/19/18 2147     PTT 26.9 seconds     Narrative:       The recommended Heparin therapeutic range is 68-97 seconds.    Basic Metabolic Panel [[id number]]  (Abnormal) Collected:  06/19/18 2112    Specimen:  Blood Updated:  06/19/18 2134     Glucose 99 mg/dL      BUN 26 (H) mg/dL      Creatinine 1.13 (H) mg/dL      Sodium 140 mmol/L      Potassium 3.5 mmol/L      Chloride 101 mmol/L      CO2 30.0 mmol/L      Calcium 9.1 mg/dL      eGFR Non African Amer 47 mL/min/1.73      BUN/Creatinine Ratio 23.0     Anion Gap 9.0 mmol/L     Narrative:       The MDRD GFR formula is only valid for adults with stable renal function between ages  18 and 70.    Magnesium [[id number]]  (Normal) Collected:  06/19/18 2112    Specimen:  Blood Updated:  06/19/18 2134     Magnesium 2.2 mg/dL     Phosphorus [[id number]]  (Normal) Collected:  06/19/18 2112    Specimen:  Blood Updated:  06/19/18 2134     Phosphorus 3.8 mg/dL     CBC Auto Differential [[id number]]  (Abnormal) Collected:  06/19/18 2112    Specimen:  Blood Updated:  06/19/18 2134     WBC 7.76 10*3/mm3      RBC 3.01 (L) 10*6/mm3      Hemoglobin 5.4 (C) g/dL      Comment: Confirmed and called to dr. Sharif at 2100 Read back at 2100        Hematocrit 20.4 (L) %      MCV 67.8 (L) fL      MCH 17.9 (L) pg      MCHC 26.5 (L) g/dL      RDW 18.9 (H) %      RDW-SD 47.3 (H) fl      MPV 10.0 fL      Platelets 322 10*3/mm3           Imaging Results (last 24 hours)     ** No results found for the last 24 hours. **          Assessment/Plan     Principal Problem:    Chronic blood loss anemia  Active Problems:    Symptomatic anemia    COPD (chronic obstructive pulmonary disease)    Paroxysmal atrial fibrillation    Hypertension    Transfuse 2 u PRBCs, stop Plavix, follow up the H/H. Consult GI.    Ernesto Sharif MD  06/20/18  6:36 AM      Electronically signed by Ernesto Sharif MD at 6/20/2018  6:56 AM          Emergency Department Notes      Adam Garcia MD at 6/19/2018  8:58 PM          Subjective   History of Present Illness   69 atrial female presents to the ED with anemia.  Was at her primary care doctor's office today because of fatigue and weakness and was found to have a hemoglobin in the fives.  She has a history of GI bleeding and is followed by Dr. Dickson.  Reportedly she has multiple small areas to bleed they're not amenable to any therapy from GI standpoint.  She required transfusions in the past.  She had a DVT in her lower extremity and required a stent placement or some other vascular procedure in January of this year.  Since then she was started on Plavix.  She reportedly was told to avoid  blood thinners but given her recent procedural stent placement she did require Plavix.  No syncope.  They have not noticed any dark tarry stools.  No vomiting of blood.  Review of Systems   Constitutional: Positive for fatigue. Negative for chills and fever.   HENT: Negative for rhinorrhea, sinus pressure and sneezing.    Eyes: Negative for pain and redness.   Respiratory: Negative for cough, chest tightness and shortness of breath.    Gastrointestinal: Positive for abdominal pain. Negative for diarrhea, nausea and vomiting.   Genitourinary: Negative for dysuria, flank pain, menstrual problem, pelvic pain, vaginal bleeding, vaginal discharge and vaginal pain.   Musculoskeletal: Negative for arthralgias, back pain and joint swelling.   Neurological: Negative for dizziness, syncope, weakness, numbness and headaches.   Hematological: Negative.    Psychiatric/Behavioral: Negative for self-injury and suicidal ideas.       Past Medical History:   Diagnosis Date   • A-fib    • Anemia    • COPD (chronic obstructive pulmonary disease)    • Emphysema of lung    • Hypertension    • Stroke        Allergies   Allergen Reactions   • Iodides Rash   • Penicillins Rash       Past Surgical History:   Procedure Laterality Date   • APPENDECTOMY     • BRAIN SURGERY      anuyrism   • CHOLECYSTECTOMY     • COLONOSCOPY N/A 5/9/2017    Procedure: COLONOSCOPY;  Surgeon: Bhaskar Dickson DO;  Location: [facility] ENDOSCOPY;  Service:    • ENDOSCOPY N/A 5/9/2017    Procedure: ESOPHAGOGASTRODUODENOSCOPY;  Surgeon: Bhaskar Dickson DO;  Location: [facility] ENDOSCOPY;  Service:    • HYSTERECTOMY         History reviewed. No pertinent family history.    Social History     Social History   • Marital status:      Social History Main Topics   • Smoking status: Current Every Day Smoker     Packs/day: 1.00     Years: 60.00     Types: Cigarettes   • Smokeless tobacco: Never Used   • Alcohol use No   • Drug use: No   • Sexual activity: No     Other  Topics Concern   • Not on file           Objective   Physical Exam   Constitutional: She is oriented to person, place, and time. She appears well-developed and well-nourished.   Pale   HENT:   Head: Normocephalic and atraumatic.   Eyes: Conjunctivae and EOM are normal. Pupils are equal, round, and reactive to light.   Pale sclera   Neck: Normal range of motion. Neck supple.   Cardiovascular: Normal rate, regular rhythm and normal heart sounds.    Pulmonary/Chest: Effort normal and breath sounds normal.   Abdominal: Soft. Bowel sounds are normal.   Genitourinary: Rectal exam shows guaiac positive stool.   Musculoskeletal: Normal range of motion.   Neurological: She is alert and oriented to person, place, and time.   Skin: Skin is warm and dry.   Psychiatric: She has a normal mood and affect.   Nursing note and vitals reviewed.      Procedures          ED Course                  MDM  Number of Diagnoses or Management Options  Diagnosis management comments: History of GI bleed with a hemoglobin in the fives.  Guaiac positive stool.  Likely slow persistent GI bleeds since January while being on Plavix.  We'll transfuse and admit the patient to the hospitalist service        Final diagnoses:   Symptomatic anemia   Other fatigue            Adam Garcia MD  06/19/18 2117      Electronically signed by Aadm Garcia MD at 6/19/2018  9:17 PM     La Nena Cai RN at 6/19/2018  9:30 PM        Attempt to call report, nurse unable to take report at this time     La Nena Cai RN  06/19/18 2149      Electronically signed by La Nena Cai RN at 6/19/2018  9:49 PM     La Nena Cai RN at 6/19/2018  9:45 PM        2nd attempt to call report, nurse unable to take report at this time     La Nena Cai RN  06/19/18 2149      Electronically signed by La Nena Cai RN at 6/19/2018  9:49 PM       Hospital Medications (all)       Dose Frequency Start End    furosemide (LASIX) tablet 20 mg 20 mg 2  "Times Daily (Diuretics) 6/20/2018     Sig - Route: Take 1 tablet by mouth 2 (Two) Times a Day. - Oral    gabapentin (NEURONTIN) capsule 100 mg 100 mg Every 12 Hours Scheduled 6/19/2018     Sig - Route: Take 1 capsule by mouth Every 12 (Twelve) Hours. - Oral    HYDROcodone-acetaminophen (NORCO) 7.5-325 MG per tablet 1 tablet 1 tablet 2 Times Daily PRN 6/19/2018     Sig - Route: Take 1 tablet by mouth 2 (Two) Times a Day As Needed for Moderate Pain . - Oral    lisinopril (PRINIVIL,ZESTRIL) tablet 10 mg 10 mg Daily 6/20/2018     Sig - Route: Take 1 tablet by mouth Daily. - Oral    pantoprazole (PROTONIX) EC tablet 40 mg 40 mg Daily 6/20/2018     Sig - Route: Take 1 tablet by mouth Daily. - Oral    rOPINIRole (REQUIP) tablet 1 mg 1 mg Nightly 6/19/2018     Sig - Route: Take 1 tablet by mouth Every Night. - Oral    sodium chloride 0.9 % flush 1-10 mL 1-10 mL As Needed 6/19/2018     Sig - Route: Infuse 1-10 mL into a venous catheter As Needed for Line Care. - Intravenous    sodium chloride 0.9 % flush 10 mL 10 mL As Needed 6/19/2018     Sig - Route: Infuse 10 mL into a venous catheter As Needed for Line Care. - Intravenous    Linked Group 1:  \"And\" Linked Group Details        sotalol (BETAPACE) tablet 80 mg 80 mg Every 24 Hours Scheduled 6/20/2018     Sig - Route: Take 1 tablet by mouth Daily. - Oral    traZODone (DESYREL) tablet 50 mg 50 mg Nightly 6/19/2018     Sig - Route: Take 1 tablet by mouth Every Night. - Oral          Lab Results (last 72 hours)     Procedure Component Value Units Date/Time    Folate [[id number]]  (Normal) Collected:  06/19/18 2112    Specimen:  Blood Updated:  06/20/18 1614     Folate >20.00 ng/mL     Vitamin B12 [[id number]]  (Normal) Collected:  06/19/18 2112    Specimen:  Blood Updated:  06/20/18 1614     Vitamin B-12 357 pg/mL     Ferritin [[id number]]  (Abnormal) Collected:  06/19/18 2112    Specimen:  Blood Updated:  06/20/18 1544     Ferritin 4.20 (L) ng/mL     Iron Profile [[id number]]  " (Abnormal) Collected:  06/19/18 2112    Specimen:  Blood Updated:  06/20/18 1515     Iron 11 (L) mcg/dL      TIBC 473 mcg/dL      Iron Saturation 2 (L) %     Basic Metabolic Panel [[id number]]  (Abnormal) Collected:  06/20/18 0609    Specimen:  Blood Updated:  06/20/18 0719     Glucose 90 mg/dL      BUN 23 (H) mg/dL      Creatinine 0.89 mg/dL      Sodium 140 mmol/L      Potassium 3.5 mmol/L      Chloride 103 mmol/L      CO2 28.0 mmol/L      Calcium 9.0 mg/dL      eGFR Non African Amer 61 mL/min/1.73      BUN/Creatinine Ratio 25.8 (H)     Anion Gap 9.0 mmol/L     Narrative:       The MDRD GFR formula is only valid for adults with stable renal function between ages 18 and 70.    CBC (No Diff) [[id number]]  (Abnormal) Collected:  06/20/18 0609    Specimen:  Blood Updated:  06/20/18 0714     WBC 6.83 10*3/mm3      RBC 3.59 (L) 10*6/mm3      Hemoglobin 7.8 (L) g/dL      Comment: PATIENT RECEIVED BLOOD        Hematocrit 25.8 (L) %      MCV 71.9 (L) fL      MCH 21.7 (L) pg      MCHC 30.2 (L) g/dL      RDW 20.6 (H) %      RDW-SD 54.4 (H) fl      MPV 10.1 fL      Platelets 234 10*3/mm3     Extra Tubes [[id number]] Collected:  06/19/18 2112    Specimen:  Blood from Blood, Venous Line Updated:  06/19/18 2215    Narrative:       The following orders were created for panel order Extra Tubes.  Procedure                               Abnormality         Status                     ---------                               -----------         ------                     Lavender Top[[id number]]                                     Final result               Gold Top - SST[[id number]]                                   Final result                 Please view results for these tests on the individual orders.    Lavender Top [[id number]] Collected:  06/19/18 2112    Specimen:  Blood Updated:  06/19/18 2215     Extra Tube hold for add-on     Comment: Auto resulted       Gold Top - SST [[id number]] Collected:  06/19/18 2112    Specimen:  Blood  Updated:  06/19/18 2215     Extra Tube Hold for add-ons.     Comment: Auto resulted.       CBC & Differential [[id number]] Collected:  06/19/18 2112    Specimen:  Blood Updated:  06/19/18 2212    Narrative:       The following orders were created for panel order CBC & Differential.  Procedure                               Abnormality         Status                     ---------                               -----------         ------                     Manual Differential[[id number]]          Abnormal            Final result               CBC Auto Differential[[id number]]        Abnormal            Final result                 Please view results for these tests on the individual orders.    Manual Differential [[id number]]  (Abnormal) Collected:  06/19/18 2112    Specimen:  Blood Updated:  06/19/18 2212     Neutrophil % 50.0 %      Lymphocyte % 32.0 %      Monocyte % 14.0 (H) %      Eosinophil % 2.0 %      Basophil % 1.0 %      Bands %  1.0 %      Neutrophils Absolute 3.96 10*3/mm3      Lymphocytes Absolute 2.48 10*3/mm3      Monocytes Absolute 1.09 (H) 10*3/mm3      Eosinophils Absolute 0.16 10*3/mm3      Basophils Absolute 0.08 10*3/mm3      Anisocytosis Mod/2+     Hypochromia Large/3+     Ovalocytes Slight/1+     Poikilocytes Mod/2+     Comment: WITH FEW EACH OF:  HELMET CELLS AND TEAR DROP CELLS         Polychromasia Slight/1+     Target Cells Slight/1+     WBC Morphology Normal     Platelet Estimate Adequate    Protime-INR [[id number]]  (Normal) Collected:  06/19/18 2112    Specimen:  Blood Updated:  06/19/18 2147     Protime 14.1 Seconds      INR 1.11    Narrative:       Therapeutic range for most indications is 2.0-3.0 INR,  or 2.5-3.5 for mechanical heart valves.    aPTT [[id number]]  (Normal) Collected:  06/19/18 2112    Specimen:  Blood Updated:  06/19/18 2147     PTT 26.9 seconds     Narrative:       The recommended Heparin therapeutic range is 68-97 seconds.    Basic Metabolic Panel [[id number]]  (Abnormal)  Collected:  06/19/18 2112    Specimen:  Blood Updated:  06/19/18 2134     Glucose 99 mg/dL      BUN 26 (H) mg/dL      Creatinine 1.13 (H) mg/dL      Sodium 140 mmol/L      Potassium 3.5 mmol/L      Chloride 101 mmol/L      CO2 30.0 mmol/L      Calcium 9.1 mg/dL      eGFR Non African Amer 47 mL/min/1.73      BUN/Creatinine Ratio 23.0     Anion Gap 9.0 mmol/L     Narrative:       The MDRD GFR formula is only valid for adults with stable renal function between ages 18 and 70.    Magnesium [[id number]]  (Normal) Collected:  06/19/18 2112    Specimen:  Blood Updated:  06/19/18 2134     Magnesium 2.2 mg/dL     Phosphorus [[id number]]  (Normal) Collected:  06/19/18 2112    Specimen:  Blood Updated:  06/19/18 2134     Phosphorus 3.8 mg/dL     CBC Auto Differential [[id number]]  (Abnormal) Collected:  06/19/18 2112    Specimen:  Blood Updated:  06/19/18 2134     WBC 7.76 10*3/mm3      RBC 3.01 (L) 10*6/mm3      Hemoglobin 5.4 (C) g/dL      Comment: Confirmed and called to dr. Sharif at 2100 Read back at 2100        Hematocrit 20.4 (L) %      MCV 67.8 (L) fL      MCH 17.9 (L) pg      MCHC 26.5 (L) g/dL      RDW 18.9 (H) %      RDW-SD 47.3 (H) fl      MPV 10.0 fL      Platelets 322 10*3/mm3           Imaging Results (last 72 hours)     ** No results found for the last 72 hours. **        ECG/EMG Results (last 72 hours)     ** No results found for the last 72 hours. **           Physician Progress Notes (last 72 hours) (Notes from 6/18/2018  8:00 AM through 6/21/2018  8:00 AM)      Robbin Spicer MD at 6/20/2018  2:34 PM              [facility] Medicine Services  INPATIENT PROGRESS NOTE    Length of Stay: 1  Date of Admission: 6/19/2018  Primary Care Physician: Charles Everett MD    Subjective     Chief Complaint   Patient presents with   • Weakness - Generalized     HPI:  77-year-old female admitted for generalized weakness, leg allergic, dyspnea, dizziness.  She has been started on Plavix  since January for her peripheral vascular disease, her symptoms have progressed until she was found to have hemoglobin of 5.4.  Patient was then referred from her PCP to ER.    6/20/18: Patient is doing better today.  Patient is status post transfusion 2 units doing very well.  She is feeling much better.  Further evaluation by GI is pending.    Review of Systems   Constitutional: Positive for activity change, appetite change and fatigue. Negative for chills and fever.   HENT: Negative for congestion and rhinorrhea.    Respiratory: Negative for chest tightness, shortness of breath and wheezing.    Cardiovascular: Negative for chest pain, palpitations and leg swelling.   Gastrointestinal: Negative for abdominal pain, anal bleeding, blood in stool, constipation, diarrhea, nausea and vomiting.   Genitourinary: Negative for dysuria, frequency and urgency.   Musculoskeletal: Negative for back pain.   Neurological: Negative for dizziness and light-headedness.   Hematological: Negative for adenopathy. Does not bruise/bleed easily.   Psychiatric/Behavioral: Negative for agitation, behavioral problems and confusion.        All pertinent negatives and positives are as above. All other systems have been reviewed and are negative unless otherwise stated.     Objective      Vital Sign Min/Max for last 24 hours  Temp  Min: 96.6 °F (35.9 °C)  Max: 99 °F (37.2 °C)   BP  Min: 93/49  Max: 152/68   Pulse  Min: 58  Max: 101   Resp  Min: 18  Max: 20   SpO2  Min: 90 %  Max: 100 %   Flow (L/min)  Min: 2  Max: 2   Weight  Min: 68.9 kg (152 lb)  Max: 68.9 kg (152 lb)         Physical Exam   Constitutional: She is oriented to person, place, and time. She appears well-developed and well-nourished.   HENT:   Head: Normocephalic and atraumatic.   Eyes: EOM are normal. Pupils are equal, round, and reactive to light.   Neck: Normal range of motion.   Cardiovascular: Normal rate, regular rhythm and normal heart sounds.    Pulmonary/Chest: Effort  normal and breath sounds normal. No respiratory distress. She has no wheezes.   Abdominal: Soft. Bowel sounds are normal.   Musculoskeletal: Normal range of motion.   Neurological: She is alert and oriented to person, place, and time.   Skin: Skin is warm. Capillary refill takes less than 2 seconds.   Psychiatric: She has a normal mood and affect. Her behavior is normal.   Vitals reviewed.      Current Facility-Administered Medications   Medication Dose Route Frequency Provider Last Rate Last Dose   • furosemide (LASIX) tablet 20 mg  20 mg Oral BID Ernesto Sharif MD   20 mg at 06/20/18 0905   • gabapentin (NEURONTIN) capsule 100 mg  100 mg Oral Q12H Ernesto Sharif MD   100 mg at 06/20/18 0905   • HYDROcodone-acetaminophen (NORCO) 7.5-325 MG per tablet 1 tablet  1 tablet Oral BID PRN Ernesto Sharif MD       • lisinopril (PRINIVIL,ZESTRIL) tablet 10 mg  10 mg Oral Daily Ernesto Sharif MD   10 mg at 06/20/18 0905   • pantoprazole (PROTONIX) EC tablet 40 mg  40 mg Oral Daily Ernesto Sharif MD   40 mg at 06/20/18 0905   • rOPINIRole (REQUIP) tablet 1 mg  1 mg Oral Nightly Ernesto Sharif MD       • sodium chloride 0.9 % flush 1-10 mL  1-10 mL Intravenous PRN Ernesto Sharif MD       • sodium chloride 0.9 % flush 10 mL  10 mL Intravenous PRN Adam Garcia MD       • sotalol (BETAPACE) tablet 80 mg  80 mg Oral Q24H Ernesto Sharif MD   80 mg at 06/20/18 0905   • traZODone (DESYREL) tablet 50 mg  50 mg Oral Nightly Ernesto Sharif MD               Results Review:  I have reviewed the labs, radiology results, and diagnostic studies.    Laboratory Data:     Results from last 7 days  Lab Units 06/20/18 0609 06/19/18  2112   SODIUM mmol/L 140 140   POTASSIUM mmol/L 3.5 3.5   CHLORIDE mmol/L 103 101   CO2 mmol/L 28.0 30.0   BUN mg/dL 23* 26*   CREATININE mg/dL 0.89 1.13*   GLUCOSE mg/dL 90 99   CALCIUM mg/dL 9.0 9.1   ANION GAP mmol/L 9.0 9.0     Estimated Creatinine Clearance: 56.7 mL/min (by C-G formula based on SCr of  0.89 mg/dL).    Results from last 7 days  Lab Units 06/19/18  2112   MAGNESIUM mg/dL 2.2   PHOSPHORUS mg/dL 3.8           Results from last 7 days  Lab Units 06/20/18  0609 06/19/18  2112   WBC 10*3/mm3 6.83 7.76   HEMOGLOBIN g/dL 7.8* 5.4*   HEMATOCRIT % 25.8* 20.4*   PLATELETS 10*3/mm3 234 322       Results from last 7 days  Lab Units 06/19/18  2112   INR  1.11           Culture Data:   No results found for: BLOODCX  No results found for: URINECX  No results found for: RESPCX  No results found for: WOUNDCX  No results found for: STOOLCX  No components found for: BODYFLD      Radiology Data:   Imaging Results (last 24 hours)     ** No results found for the last 24 hours. **          I have reviewed the patient current medications.     Assessment/Plan     Active Hospital Problems (** Indicates Principal Problem)    Diagnosis Date Noted   • **Chronic blood loss anemia [D50.0] 06/19/2018   • Symptomatic anemia [D64.9] 06/19/2018   • Paroxysmal atrial fibrillation [I48.0] 03/23/2017   • COPD (chronic obstructive pulmonary disease) [J44.9] 03/23/2017   • Hypertension [I10] 03/23/2017      Resolved Hospital Problems    Diagnosis Date Noted Date Resolved   No resolved problems to display.     1.  Chronic blood loss anemia: Patient was on aspirin and Plavix, there on hold.  She is status post transfusion 2 units.  Hemoglobin is 7.8 now.  Further GI evaluation is pending.  We'll contact patient vascular surgeon to determine whether she needs to stay on Plavix and aspirin or not.  2.  Paroxysmal atrial fibrillation: Patient has been on sotalol and she has been in normal sinus rhythm.  She is not on any anticoagulation due to repeated GI bleeding.  3. COPD without exacerbation: Currently she appeared to be doing well and will continue with current therapy.  4.  Hypertension: Currently normotensive on lisinopril and continue to monitor.  5. JACQUES: resolved.     Discharge Planning: I expect patient to be discharged to home in 1-2  days.      DVT Prophylaxis: scd/teds               This document has been electronically signed by Robbin Spicer MD on June 20, 2018 2:34 PM          Electronically signed by Robbin Spicer MD at 6/20/2018  2:41 PM          Consult Notes (last 72 hours) (Notes from 6/18/2018  8:00 AM through 6/21/2018  8:00 AM)      Ramiro Hart DO at 6/20/2018  9:04 PM           Ramiro Hart DO,[facility]  Gastroenterology  Hepatology  Endoscopy  Board Certified in Internal Medicine and gastroenterology  [address] [address], [address]  [place]. [address]  T- [phone]   F - [phone]     GASTROENTEROLOGY CONSULT NOTE   RAMIRO HART DO.         SUBJECTIVE:   6/20/2018    Name: Sarah Layton  DOD: 1940    REASON FOR CONSULT: Anemia with chronic blood loss    Chief Complaint:     Chief Complaint   Patient presents with   • Weakness - Generalized       Subjective : Weakness with finding of severe anemia    Patient is 77 y.o. 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  The patient has been on Plavix.  Denies any abdominal pain.  No nausea vomiting.  No vomiting of blood or passage of blood through the stools.    Upon entering the room, she says that she does not want to take any type of treatments regarding endoscopy.    She is anxious for discharge from the hospital.     ROS/HISTORY/ CURRENT MEDICATIONS/OBJECTIVE/VS/PE:   Review of Systems:   Review of Systems   Constitutional: Positive for activity change, appetite change and fatigue.   HENT: Negative.    Eyes: Negative.    Respiratory: Positive for shortness of breath.    Cardiovascular: Negative.    Gastrointestinal: Negative.    Endocrine: Negative.    Genitourinary: Negative.    Musculoskeletal: Positive for arthralgias and back pain.   Skin: Positive for color change and pallor.   Allergic/Immunologic: Negative.    Neurological: Positive  for weakness and light-headedness.   Hematological: Bruises/bleeds easily.   Psychiatric/Behavioral: The patient is nervous/anxious.        History:     Past Medical History:   Diagnosis Date   • Atherosclerosis    • Chronic blood loss anemia    • COPD (chronic obstructive pulmonary disease)    • Depression    • Emphysema of lung    • History of transfusion    • Hypertension    • Osteoporosis    • Paroxysmal atrial fibrillation    • PVD (peripheral vascular disease)    • Stroke      Past Surgical History:   Procedure Laterality Date   • APPENDECTOMY     • CEREBRAL ANEURYSM REPAIR     • CHOLECYSTECTOMY     • COLONOSCOPY N/A 5/9/2017   • ENDOSCOPY N/A 5/9/2017   • HYSTERECTOMY       Family History   Problem Relation Age of Onset   • Hypertension Father      Social History   Substance Use Topics   • Smoking status: Current Every Day Smoker     Packs/day: 1.00     Years: 60.00     Types: Cigarettes   • Smokeless tobacco: Never Used   • Alcohol use No     Prescriptions Prior to Admission   Medication Sig Dispense Refill Last Dose   • aspirin 81 MG chewable tablet Chew 81 mg Daily.   6/19/2018 at 0800   • clopidogrel (PLAVIX) 75 MG tablet Take 75 mg by mouth Daily.   6/19/2018 at 0800   • famotidine (PEPCID) 40 MG tablet Take 40 mg by mouth Daily.   6/19/2018 at 1900   • furosemide (LASIX) 20 MG tablet Take 1 tablet by mouth 3 (Three) Times a Week. Monday, Wednesday, Friday 12 tablet 6 6/19/2018 at 0800   • gabapentin (NEURONTIN) 100 MG capsule Take 100 mg by mouth 3 (Three) Times a Day.   6/19/2018 at 1900   • HYDROcodone-acetaminophen (NORCO) 5-325 MG per tablet Take 1 tablet by mouth 2 (Two) Times a Day As Needed.   6/19/2018 at 1900   • lisinopril (PRINIVIL,ZESTRIL) 10 MG tablet Take 10 mg by mouth Daily.   6/19/2018 at 1900   • Nutritional Supplements (ENSURE ACTIVE) liquid Take  by mouth.   6/19/2018 at Unknown time   • pantoprazole (PROTONIX) 40 MG EC tablet Take 40 mg by mouth Daily.   6/19/2018 at 0800   •  rOPINIRole (REQUIP) 1 MG tablet Take 1 mg by mouth Every Night. Take 1 hour before bedtime.   6/19/2018 at 1900   • simvastatin (ZOCOR) 20 MG tablet Take 20 mg by mouth Every Night.   6/19/2018 at 1900   • sotalol (BETAPACE) 80 MG tablet Take 80 mg by mouth 2 (Two) Times a Day.   6/19/2018 at 1900   • traZODone (DESYREL) 50 MG tablet Take 50 mg by mouth Every Night.   6/19/2018 at 1900     Allergies:  Iodides and Penicillins    I have reviewed the patients medical history, surgical history and family history in the available medical record system.     Current Medications:     Current Facility-Administered Medications   Medication Dose Route Frequency Provider Last Rate Last Dose   • furosemide (LASIX) tablet 20 mg  20 mg Oral BID Ernesto Sharif MD   20 mg at 06/20/18 1739   • gabapentin (NEURONTIN) capsule 100 mg  100 mg Oral Q12H Ernesto Sharif MD   100 mg at 06/20/18 2018   • HYDROcodone-acetaminophen (NORCO) 7.5-325 MG per tablet 1 tablet  1 tablet Oral BID PRN Ernesto Sharif MD       • lisinopril (PRINIVIL,ZESTRIL) tablet 10 mg  10 mg Oral Daily Ernesto Sharif MD   10 mg at 06/20/18 0905   • pantoprazole (PROTONIX) EC tablet 40 mg  40 mg Oral Daily Ernesto Sharif MD   40 mg at 06/20/18 0905   • rOPINIRole (REQUIP) tablet 1 mg  1 mg Oral Nightly Ernesto Sharif MD   1 mg at 06/20/18 2018   • sodium chloride 0.9 % flush 1-10 mL  1-10 mL Intravenous PRN Ernesto Sharif MD       • sodium chloride 0.9 % flush 10 mL  10 mL Intravenous PRN Adam Garcia MD       • sotalol (BETAPACE) tablet 80 mg  80 mg Oral Q24H Ernesto Sharif MD   80 mg at 06/20/18 0905   • traZODone (DESYREL) tablet 50 mg  50 mg Oral Nightly Ernesto Sharif MD   50 mg at 06/20/18 2019       Objective     Physical Exam:   Temp:  [96.6 °F (35.9 °C)-97.7 °F (36.5 °C)] 97.5 °F (36.4 °C)  Heart Rate:  [] 89  Resp:  [18-20] 18  BP: ()/(48-84) 123/84    Physical Exam:  General Appearance:    Alert, cooperative, in no acute distress    Head:    Normocephalic, without obvious abnormality, atraumatic   Eyes:            Lids and lashes normal, conjunctivae and sclerae normal, no   icterus, no pallor, corneas clear, PERRLA   Ears:    Ears appear intact with no abnormalities noted   Throat:   No oral lesions, no thrush, oral mucosa moist   Neck:   No adenopathy, supple, trachea midline, no thyromegaly, no     carotid bruit, no JVD   Back:     No kyphosis present, no scoliosis present, no skin lesions,       erythema or scars, no tenderness to percussion or                   palpation,   range of motion normal   Lungs:     Clear to auscultation,respirations regular, even and                   unlabored    Heart:    Regular rhythm and normal rate, normal S1 and S2, no            murmur, no gallop, no rub, no click   Breast Exam:    Deferred   Abdomen:     Normal bowel sounds, no masses, no organomegaly, soft        non-tender, non-distended, no guarding, no rebound                 tenderness   Genitalia:    Deferred   Extremities:   Moves all extremities well, no edema, no cyanosis, no              redness   Pulses:   Pulses palpable and equal bilaterally   Skin:   No bleeding, bruising or rash   Lymph nodes:   No palpable adenopathy   Neurologic:   Cranial nerves 2 - 12 grossly intact, sensation intact, DTR        present and equal bilaterally      Results Review:     Lab Results   Component Value Date    WBC 6.83 06/20/2018    WBC 7.76 06/19/2018    WBC 3.38 03/24/2017    HGB 7.8 (L) 06/20/2018    HGB 5.4 (C) 06/19/2018    HGB 10.9 (L) 03/24/2017    HCT 25.8 (L) 06/20/2018    HCT 20.4 (L) 06/19/2018    HCT 32.5 (L) 03/24/2017     06/20/2018     06/19/2018     03/24/2017             No results found for: LIPASE  Lab Results   Component Value Date    INR 1.11 06/19/2018    INR 1.06 03/22/2017    INR 1.01 03/22/2017          Radiology Review:  Imaging Results (last 72 hours)     ** No results found for the last 72 hours. **            I reviewed the patient's new clinical results.  I reviewed the patient's new imaging results and agree with the interpretation.     ASSESSMENT/PLAN:   ASSESSMENT:   1.  Anemia secondary to chronic blood loss, secondary to vascular malformations of the gastrointestinal system most likely    PLAN:   1.  No endoscopic evaluation is to be done as per the patient's request  2.  I would recommend evaluation of the aspirin and Plavix and the necessity of taking this.  If you continue to give the patient aspirin or Plavix I would recommend at least CBCs on a monthly basis.  3.  Continue iron therapy, probably lifelong  4.  If I can be of any further assistance, please do not hesitate to call.       Bhaskar Dickson DO  06/20/18  9:04 PM       Electronically signed by Bhaskar Dickson DO at 6/20/2018  9:09 PM

## 2018-06-22 NOTE — PAYOR COMM NOTE
"Sarah Layton (77 y.o. Female)     Date of Birth Social Security Number Address Home Phone MRN    1940  [address] [place] [address] [phone] [id number]    Yazdanism Marital Status          Sikh        Admission Date Admission Type Admitting Provider Attending Provider Department, Room/Bed    18 Emergency Ernesto Sharif MD  [facility], 380/1    Discharge Date Discharge Disposition Discharge Destination        2018 Home-Health Care Svc              Attending Provider:  (none)   Allergies:  Iodides, Penicillins    Isolation:  None   Infection:  None   Code Status:  Prior    Ht:  170.2 cm (67\")   Wt:  68.9 kg (152 lb)    Admission Cmt:  None   Principal Problem:  Chronic blood loss anemia [D50.0]                 Active Insurance as of 2018     Primary Coverage     Payor Plan Insurance Group Employer/Plan Group    AET[employer] MEDICARE REPLACEMENT AETNA [id number]     Payor Plan Address Payor Plan Phone Number Effective From Effective To    [address] [phone] 2017     [facility] [id number]       Subscriber Name Subscriber Birth Date Member ID       SARAH LAYTON 1940 [id number]                 Emergency Contacts      (Rel.) Home Phone Work Phone Mobile Phone    Keli Hilario (Guardian) [phone] -- [phone]               Discharge Summary      Robbin Spicer MD at 2018  4:33 PM           [address] [address]  T - [phone]     DISCHARGE SUMMARY         PATIENT  DEMOGRAPHICS   PATIENT NAME: Sarah Layton                      PHYSICIAN: Robbin Spicer MD  : 1940  MRN: [id number]    ADMISSION/DISCHARGE INFO   ADMISSION DATE: 2018   DISCHARGE DATE: 18    ADMISSION DIAGNOSES: Symptomatic anemia [D64.9]  Other fatigue [R53.83]  DISCHARGE DIAGNOSES:     Principal Problem:    Chronic blood loss anemia  Active Problems:    COPD " (chronic obstructive pulmonary disease)    Paroxysmal atrial fibrillation    Hypertension    Symptomatic anemia      SERVICE:  Medicine       CONSULTS   Consult Orders (all)     Start     Ordered    06/19/18 2218  Inpatient Gastroenterology Consult  Once     Specialty:  Gastroenterology  Provider:  Bhaskar Dickson,     06/19/18 2217          PROCEDURES     Imaging Results (last 24 hours)     ** No results found for the last 24 hours. **          No results found.    HISTORY OF PRESENT ILLNESS   Sarah Layton is a 77 y.o. female admitted for general weakness, leg of energy, dyspnea, dizziness.  She has been started on Plavix since January for her PVD, her symptom have progressively worsened until she was found to have hemoglobin of 5.4.  Patient was then referred from her PCP clinic to the ER.  Patient denied any episode of blood in her stool.  Patient does have a history of GI bleed in the past required multiple transfusions.    DIAGNOSTIC DATA   Labs  Images    HOSPITAL COURSE   Patient was admitted to the medical floor, she was given 2 units of packed red blood cells.  Patient tolerated the transfusion very well.  Her hemoglobin improved to 7.8 subsequently improved to 8.7.  GI was consulted for possible endoscopy.  Patient refused endoscopy.  I discussed the case with her daughter who is the guardian and they came to an agreement to have this done as outpatient on Monday.  I also called and discussed the case with Dr. Grace who is the vascular surgeon who had done the stent and also broke.  He recommended for patient to have upper and lower endoscopy as well.  He stated the patient can be off of aspirin and Plavix for 2-3 weeks until she sees him in his clinic.  I discussed the case with Dr. Dickson he agree for patient to have endoscopy done next week and on outpatient basis.  Patient daughter understand that patient will need a follow-up labs as outpatient basis to ensure that she is not losing any  further blood.  Patient will be given IV iron replacement here in the hospital, and will be started on PO iron supplement as out pt.      DISCHARGE CONDITION   Stable    DISPOSITION   To home with home health      DISCHARGE MEDICATIONS        Discharge Medications      New Medications      Instructions Start Date   ferrous sulfate 325 (65 FE) MG tablet   325 mg, Oral, Daily With Breakfast      sucralfate 1 g tablet  Commonly known as:  CARAFATE   1 g, Oral, 4 Times Daily         Changes to Medications      Instructions Start Date   pantoprazole 40 MG EC tablet  Commonly known as:  PROTONIX  What changed:  when to take this   40 mg, Oral, 2 Times Daily         Continue These Medications      Instructions Start Date   BETAPACE 80 MG tablet  Generic drug:  sotalol   80 mg, Oral, 2 Times Daily      ENSURE ACTIVE liquid   Oral      furosemide 20 MG tablet  Commonly known as:  LASIX   20 mg, Oral, 3 Times Weekly, Monday, Wednesday, Friday      gabapentin 100 MG capsule  Commonly known as:  NEURONTIN   100 mg, Oral, 3 Times Daily      HYDROcodone-acetaminophen 5-325 MG per tablet  Commonly known as:  NORCO   1 tablet, Oral, 2 Times Daily PRN      lisinopril 10 MG tablet  Commonly known as:  PRINIVIL,ZESTRIL   10 mg, Oral, Daily      rOPINIRole 1 MG tablet  Commonly known as:  REQUIP   1 mg, Oral, Nightly, Take 1 hour before bedtime.       simvastatin 20 MG tablet  Commonly known as:  ZOCOR   20 mg, Oral, Nightly      traZODone 50 MG tablet  Commonly known as:  DESYREL   50 mg, Oral, Nightly         Stop These Medications    aspirin 81 MG chewable tablet     clopidogrel 75 MG tablet  Commonly known as:  PLAVIX     famotidine 40 MG tablet  Commonly known as:  PEPCID              INSTRUCTIONS   Activity:   Activity Instructions     Discharge Activity       As tolerated          Diet:   Diet Instructions     Diet: Regular, Cardiac       Discharge Diet:   Regular  Cardiac             Special Instructions: Patient instructed to  call M.D. or return to ED with worsening shortness of breath, chest pain, fever greater than 100.4°F or any other medical concerns.    FOLLOW UP   Follow-up Information     Charles Everett MD Follow up.    Specialty:  Family Medicine  Contact information:  [address] [facility] [address] [phone]             Bhaskar Dickson DO Follow up on 6/25/2018.    Specialty:  Gastroenterology  Contact information:  [address] [address] [phone]             Bhaskar Grace Jr., MD Follow up in 3 week(s).    Specialty:  General Surgery  Contact information:  [address] [facility] [address]  [phone]                  PENDING TEST RESULTS AT DISCHARGE      TIME   Time: 30 minutes were spent planning this discharge.                      This document has been electronically signed by Robbin Spicer MD on June 21, 2018 4:41 PM             Electronically signed by Robbin Spicer MD at 6/21/2018  4:41 PM

## 2018-06-23 LAB
ABO + RH BLD: NORMAL
ABO + RH BLD: NORMAL
BH BB BLOOD EXPIRATION DATE: NORMAL
BH BB BLOOD EXPIRATION DATE: NORMAL
BH BB BLOOD TYPE BARCODE: 6200
BH BB BLOOD TYPE BARCODE: 6200
BH BB DISPENSE STATUS: NORMAL
BH BB DISPENSE STATUS: NORMAL
BH BB PRODUCT CODE: NORMAL
BH BB PRODUCT CODE: NORMAL
BH BB UNIT NUMBER: NORMAL
BH BB UNIT NUMBER: NORMAL
UNIT  ABO: NORMAL
UNIT  ABO: NORMAL
UNIT  RH: NORMAL
UNIT  RH: NORMAL

## 2018-06-27 ENCOUNTER — ANESTHESIA EVENT (OUTPATIENT)
Dept: GASTROENTEROLOGY | Facility: HOSPITAL | Age: 78
End: 2018-06-27

## 2018-06-27 ENCOUNTER — ANESTHESIA (OUTPATIENT)
Dept: GASTROENTEROLOGY | Facility: HOSPITAL | Age: 78
End: 2018-06-27

## 2018-06-27 ENCOUNTER — HOSPITAL ENCOUNTER (OUTPATIENT)
Facility: HOSPITAL | Age: 78
Setting detail: HOSPITAL OUTPATIENT SURGERY
Discharge: HOME | End: 2018-06-27
Attending: INTERNAL MEDICINE | Admitting: INTERNAL MEDICINE

## 2018-06-27 VITALS
SYSTOLIC BLOOD PRESSURE: 101 MMHG | RESPIRATION RATE: 18 BRPM | DIASTOLIC BLOOD PRESSURE: 55 MMHG | TEMPERATURE: 97.9 F | WEIGHT: 146 LBS | HEART RATE: 71 BPM | HEIGHT: 67 IN | BODY MASS INDEX: 22.91 KG/M2 | OXYGEN SATURATION: 95 %

## 2018-06-27 DIAGNOSIS — K55.20 ANGIODYSPLASIA OF COLON: ICD-10-CM

## 2018-06-27 DIAGNOSIS — R19.5 ABNORMAL FECES: ICD-10-CM

## 2018-06-27 PROCEDURE — 25010000002 FENTANYL CITRATE (PF) 100 MCG/2ML SOLUTION: Performed by: NURSE ANESTHETIST, CERTIFIED REGISTERED

## 2018-06-27 PROCEDURE — 25010000002 PROPOFOL 10 MG/ML EMULSION: Performed by: NURSE ANESTHETIST, CERTIFIED REGISTERED

## 2018-06-27 PROCEDURE — 88305 TISSUE EXAM BY PATHOLOGIST: CPT | Performed by: PATHOLOGY

## 2018-06-27 PROCEDURE — 88305 TISSUE EXAM BY PATHOLOGIST: CPT | Performed by: INTERNAL MEDICINE

## 2018-06-27 DEVICE — DEV CLIP ENDO RESOLUTION360 CONTRL ROT 235CM: Type: IMPLANTABLE DEVICE | Site: STOMACH | Status: FUNCTIONAL

## 2018-06-27 RX ORDER — FENTANYL CITRATE 50 UG/ML
INJECTION, SOLUTION INTRAMUSCULAR; INTRAVENOUS AS NEEDED
Status: DISCONTINUED | OUTPATIENT
Start: 2018-06-27 | End: 2018-06-27 | Stop reason: SURG

## 2018-06-27 RX ORDER — ONDANSETRON 2 MG/ML
4 INJECTION INTRAMUSCULAR; INTRAVENOUS ONCE AS NEEDED
Status: DISCONTINUED | OUTPATIENT
Start: 2018-06-27 | End: 2018-06-27 | Stop reason: HOSPADM

## 2018-06-27 RX ORDER — DEXTROSE AND SODIUM CHLORIDE 5; .45 G/100ML; G/100ML
20 INJECTION, SOLUTION INTRAVENOUS CONTINUOUS
Status: DISCONTINUED | OUTPATIENT
Start: 2018-06-27 | End: 2018-06-27 | Stop reason: HOSPADM

## 2018-06-27 RX ORDER — PROPOFOL 10 MG/ML
VIAL (ML) INTRAVENOUS AS NEEDED
Status: DISCONTINUED | OUTPATIENT
Start: 2018-06-27 | End: 2018-06-27 | Stop reason: SURG

## 2018-06-27 RX ORDER — PROMETHAZINE HYDROCHLORIDE 25 MG/1
25 TABLET ORAL ONCE AS NEEDED
Status: DISCONTINUED | OUTPATIENT
Start: 2018-06-27 | End: 2018-06-27 | Stop reason: HOSPADM

## 2018-06-27 RX ORDER — PROMETHAZINE HYDROCHLORIDE 25 MG/ML
12.5 INJECTION, SOLUTION INTRAMUSCULAR; INTRAVENOUS ONCE AS NEEDED
Status: DISCONTINUED | OUTPATIENT
Start: 2018-06-27 | End: 2018-06-27 | Stop reason: HOSPADM

## 2018-06-27 RX ORDER — PROMETHAZINE HYDROCHLORIDE 25 MG/1
25 SUPPOSITORY RECTAL ONCE AS NEEDED
Status: DISCONTINUED | OUTPATIENT
Start: 2018-06-27 | End: 2018-06-27 | Stop reason: HOSPADM

## 2018-06-27 RX ADMIN — FENTANYL CITRATE 50 MCG: 50 INJECTION, SOLUTION INTRAMUSCULAR; INTRAVENOUS at 16:42

## 2018-06-27 RX ADMIN — PROPOFOL 30 MG: 10 INJECTION, EMULSION INTRAVENOUS at 16:47

## 2018-06-27 RX ADMIN — DEXTROSE AND SODIUM CHLORIDE 20 ML/HR: 5; 450 INJECTION, SOLUTION INTRAVENOUS at 15:54

## 2018-06-27 RX ADMIN — FENTANYL CITRATE 50 MCG: 50 INJECTION, SOLUTION INTRAMUSCULAR; INTRAVENOUS at 16:28

## 2018-06-27 RX ADMIN — PROPOFOL 30 MG: 10 INJECTION, EMULSION INTRAVENOUS at 16:37

## 2018-06-27 RX ADMIN — PROPOFOL 30 MG: 10 INJECTION, EMULSION INTRAVENOUS at 16:40

## 2018-06-27 RX ADMIN — PROPOFOL 50 MG: 10 INJECTION, EMULSION INTRAVENOUS at 16:34

## 2018-06-27 RX ADMIN — PROPOFOL 30 MG: 10 INJECTION, EMULSION INTRAVENOUS at 16:43

## 2018-06-27 RX ADMIN — PROPOFOL 30 MG: 10 INJECTION, EMULSION INTRAVENOUS at 16:53

## 2018-06-27 NOTE — ANESTHESIA PREPROCEDURE EVALUATION
Anesthesia Evaluation     Patient summary reviewed and Nursing notes reviewed   no history of anesthetic complications:               Airway   Mallampati: II  TM distance: >3 FB  Neck ROM: full  Possible difficult intubation and No difficulty expected  Dental    (+) upper dentures and lower dentures    Pulmonary    (+) a smoker Current Smoked day of surgery, COPD, rhonchi,   (-) pulmonary embolism  Cardiovascular - normal exam    (+) hypertension, PVD, hyperlipidemia,       Neuro/Psych  (+) CVA residual symptoms, psychiatric history Anxiety and Depression, poor historian.,     GI/Hepatic/Renal/Endo    (+)  GERD,      Musculoskeletal (-) negative ROS    Abdominal  - normal exam   Substance History - negative use     OB/GYN negative ob/gyn ROS         Other - negative ROS                       Anesthesia Plan    ASA 3     MAC     Anesthetic plan and risks discussed with patient, healthcare power of  and child.

## 2018-06-27 NOTE — ANESTHESIA POSTPROCEDURE EVALUATION
Patient: Sarah Layton    Procedure Summary     Date:  06/27/18 Room / Location:  [facility] ENDOSCOPY 2 / [facility] ENDOSCOPY    Anesthesia Start:  1628 Anesthesia Stop:  1704    Procedures:       ESOPHAGOGASTRODUODENOSCOPY (N/A )      COLONOSCOPY (N/A ) Diagnosis:       Abnormal feces      Angiodysplasia of colon      (Abnormal feces [R19.5])      (Angiodysplasia of colon [K55.20])    Surgeon:  Bhaskar Dickson DO Provider:  Robyn Go CRNA    Anesthesia Type:  MAC ASA Status:  3          Anesthesia Type: MAC  Last vitals  BP   145/75 (06/27/18 1536)   Temp   97.7 °F (36.5 °C) (06/27/18 1536)   Pulse   86 (06/27/18 1536)   Resp   12 (06/27/18 1536)     SpO2   93 % (06/27/18 1546)     Post Anesthesia Care and Evaluation    Patient location during evaluation: bedside  Patient participation: complete - patient participated  Level of consciousness: awake and awake and alert  Pain management: adequate  Airway patency: patent  Anesthetic complications: No anesthetic complications  PONV Status: none  Cardiovascular status: acceptable  Respiratory status: acceptable  Hydration status: acceptable

## 2018-06-29 LAB
LAB AP CASE REPORT: NORMAL
PATH REPORT.FINAL DX SPEC: NORMAL
PATH REPORT.GROSS SPEC: NORMAL

## 2019-01-03 ENCOUNTER — HOSPITAL ENCOUNTER (OUTPATIENT)
Facility: HOSPITAL | Age: 79
Setting detail: OBSERVATION
Discharge: HOME | End: 2019-01-04
Attending: FAMILY MEDICINE | Admitting: INTERNAL MEDICINE

## 2019-01-03 DIAGNOSIS — G47.00 PERSISTENT INSOMNIA: ICD-10-CM

## 2019-01-03 DIAGNOSIS — R53.81 MALAISE AND FATIGUE: ICD-10-CM

## 2019-01-03 DIAGNOSIS — R26.2 DIFFICULTY WALKING: ICD-10-CM

## 2019-01-03 DIAGNOSIS — D50.0 CHRONIC BLOOD LOSS ANEMIA: Chronic | ICD-10-CM

## 2019-01-03 DIAGNOSIS — I48.0 PAROXYSMAL ATRIAL FIBRILLATION (HCC): Chronic | ICD-10-CM

## 2019-01-03 DIAGNOSIS — I26.99 OTHER PULMONARY EMBOLISM WITHOUT ACUTE COR PULMONALE, UNSPECIFIED CHRONICITY (HCC): ICD-10-CM

## 2019-01-03 DIAGNOSIS — M54.6 PAIN IN THORACIC SPINE: ICD-10-CM

## 2019-01-03 DIAGNOSIS — D64.9 SYMPTOMATIC ANEMIA: Primary | ICD-10-CM

## 2019-01-03 DIAGNOSIS — R06.02 SHORTNESS OF BREATH: ICD-10-CM

## 2019-01-03 DIAGNOSIS — Z72.0 TOBACCO USE: ICD-10-CM

## 2019-01-03 DIAGNOSIS — E78.5 HYPERLIPIDEMIA, UNSPECIFIED HYPERLIPIDEMIA TYPE: ICD-10-CM

## 2019-01-03 DIAGNOSIS — Z86.73 HISTORY OF STROKE: Chronic | ICD-10-CM

## 2019-01-03 DIAGNOSIS — R53.83 MALAISE AND FATIGUE: ICD-10-CM

## 2019-01-03 DIAGNOSIS — G25.81 RESTLESS LEGS SYNDROME: ICD-10-CM

## 2019-01-03 DIAGNOSIS — R47.01 EXPRESSIVE APHASIA: Chronic | ICD-10-CM

## 2019-01-03 DIAGNOSIS — R41.89 IMPAIRED COGNITION: ICD-10-CM

## 2019-01-03 LAB
ABO GROUP BLD: NORMAL
ALBUMIN SERPL-MCNC: 3.7 G/DL (ref 3.4–4.8)
ALBUMIN/GLOB SERPL: 1.3 G/DL (ref 1.1–1.8)
ALP SERPL-CCNC: 81 U/L (ref 38–126)
ALT SERPL W P-5'-P-CCNC: 14 U/L (ref 9–52)
ANION GAP SERPL CALCULATED.3IONS-SCNC: 6 MMOL/L (ref 5–15)
ANISOCYTOSIS BLD QL: ABNORMAL
APTT PPP: 27.6 SECONDS (ref 20–40.3)
AST SERPL-CCNC: 31 U/L (ref 14–36)
BASOPHILS # BLD MANUAL: 0.09 10*3/MM3 (ref 0–0.2)
BASOPHILS NFR BLD AUTO: 1 % (ref 0–2)
BILIRUB SERPL-MCNC: 0.5 MG/DL (ref 0.2–1.3)
BLD GP AB SCN SERPL QL: NEGATIVE
BUN BLD-MCNC: 11 MG/DL (ref 7–21)
BUN/CREAT SERPL: 15.1 (ref 7–25)
CALCIUM SPEC-SCNC: 8.9 MG/DL (ref 8.4–10.2)
CHLORIDE SERPL-SCNC: 98 MMOL/L (ref 95–110)
CO2 SERPL-SCNC: 31 MMOL/L (ref 22–31)
CREAT BLD-MCNC: 0.73 MG/DL (ref 0.5–1)
DEPRECATED RDW RBC AUTO: 45.2 FL (ref 36.4–46.3)
EOSINOPHIL # BLD MANUAL: 0.45 10*3/MM3 (ref 0–0.7)
EOSINOPHIL NFR BLD MANUAL: 5 % (ref 0–7)
ERYTHROCYTE [DISTWIDTH] IN BLOOD BY AUTOMATED COUNT: 17.6 % (ref 11.5–14.5)
GFR SERPL CREATININE-BSD FRML MDRD: 77 ML/MIN/1.73 (ref 39–90)
GLOBULIN UR ELPH-MCNC: 2.8 GM/DL (ref 2.3–3.5)
GLUCOSE BLD-MCNC: 100 MG/DL (ref 60–100)
HCT VFR BLD AUTO: 25.4 % (ref 35–45)
HGB BLD-MCNC: 7 G/DL (ref 12–15.5)
HOLD SPECIMEN: NORMAL
HOLD SPECIMEN: NORMAL
HYPOCHROMIA BLD QL: ABNORMAL
INR PPP: 1.2 (ref 0.8–1.2)
LYMPHOCYTES # BLD MANUAL: 0.81 10*3/MM3 (ref 0.6–4.2)
LYMPHOCYTES NFR BLD MANUAL: 11 % (ref 0–12)
LYMPHOCYTES NFR BLD MANUAL: 9 % (ref 10–50)
MCH RBC QN AUTO: 19.4 PG (ref 26.5–34)
MCHC RBC AUTO-ENTMCNC: 27.6 G/DL (ref 31.4–36)
MCV RBC AUTO: 70.6 FL (ref 80–98)
METAMYELOCYTES NFR BLD MANUAL: 1 % (ref 0–0)
MONOCYTES # BLD AUTO: 0.99 10*3/MM3 (ref 0–0.9)
NEUTROPHILS # BLD AUTO: 6.55 10*3/MM3 (ref 2–8.6)
NEUTROPHILS NFR BLD MANUAL: 72 % (ref 37–80)
NEUTS BAND NFR BLD MANUAL: 1 % (ref 0–5)
PLAT MORPH BLD: NORMAL
PLATELET # BLD AUTO: 281 10*3/MM3 (ref 150–450)
PMV BLD AUTO: 10.2 FL (ref 8–12)
POLYCHROMASIA BLD QL SMEAR: ABNORMAL
POTASSIUM BLD-SCNC: 3.7 MMOL/L (ref 3.5–5.1)
PROT SERPL-MCNC: 6.5 G/DL (ref 6.3–8.6)
PROTHROMBIN TIME: 14.9 SECONDS (ref 11.1–15.3)
RBC # BLD AUTO: 3.6 10*6/MM3 (ref 3.77–5.16)
RH BLD: POSITIVE
SODIUM BLD-SCNC: 135 MMOL/L (ref 137–145)
T&S EXPIRATION DATE: NORMAL
WBC MORPH BLD: NORMAL
WBC NRBC COR # BLD: 8.97 10*3/MM3 (ref 3.2–9.8)
WHOLE BLOOD HOLD SPECIMEN: NORMAL

## 2019-01-03 PROCEDURE — G0378 HOSPITAL OBSERVATION PER HR: HCPCS

## 2019-01-03 PROCEDURE — 85007 BL SMEAR W/DIFF WBC COUNT: CPT | Performed by: FAMILY MEDICINE

## 2019-01-03 PROCEDURE — 80053 COMPREHEN METABOLIC PANEL: CPT | Performed by: FAMILY MEDICINE

## 2019-01-03 PROCEDURE — 86900 BLOOD TYPING SEROLOGIC ABO: CPT | Performed by: FAMILY MEDICINE

## 2019-01-03 PROCEDURE — 36430 TRANSFUSION BLD/BLD COMPNT: CPT

## 2019-01-03 PROCEDURE — 85610 PROTHROMBIN TIME: CPT | Performed by: PHYSICIAN ASSISTANT

## 2019-01-03 PROCEDURE — 86900 BLOOD TYPING SEROLOGIC ABO: CPT

## 2019-01-03 PROCEDURE — 85025 COMPLETE CBC W/AUTO DIFF WBC: CPT | Performed by: FAMILY MEDICINE

## 2019-01-03 PROCEDURE — 86901 BLOOD TYPING SEROLOGIC RH(D): CPT | Performed by: FAMILY MEDICINE

## 2019-01-03 PROCEDURE — 36415 COLL VENOUS BLD VENIPUNCTURE: CPT | Performed by: PHYSICIAN ASSISTANT

## 2019-01-03 PROCEDURE — 85730 THROMBOPLASTIN TIME PARTIAL: CPT | Performed by: PHYSICIAN ASSISTANT

## 2019-01-03 PROCEDURE — 86850 RBC ANTIBODY SCREEN: CPT | Performed by: FAMILY MEDICINE

## 2019-01-03 PROCEDURE — P9016 RBC LEUKOCYTES REDUCED: HCPCS

## 2019-01-03 PROCEDURE — 86923 COMPATIBILITY TEST ELECTRIC: CPT

## 2019-01-03 PROCEDURE — 99285 EMERGENCY DEPT VISIT HI MDM: CPT

## 2019-01-03 RX ORDER — CLOPIDOGREL BISULFATE 75 MG/1
75 TABLET ORAL DAILY
COMMUNITY

## 2019-01-03 RX ORDER — FUROSEMIDE 20 MG/1
20 TABLET ORAL 3 TIMES WEEKLY
Status: DISCONTINUED | OUTPATIENT
Start: 2019-01-04 | End: 2019-01-04 | Stop reason: HOSPADM

## 2019-01-03 RX ORDER — TRAZODONE HYDROCHLORIDE 50 MG/1
50 TABLET ORAL NIGHTLY
Status: DISCONTINUED | OUTPATIENT
Start: 2019-01-03 | End: 2019-01-04 | Stop reason: HOSPADM

## 2019-01-03 RX ORDER — FERROUS SULFATE TAB EC 324 MG (65 MG FE EQUIVALENT) 324 (65 FE) MG
324 TABLET DELAYED RESPONSE ORAL
Status: DISCONTINUED | OUTPATIENT
Start: 2019-01-04 | End: 2019-01-04 | Stop reason: HOSPADM

## 2019-01-03 RX ORDER — ATORVASTATIN CALCIUM 10 MG/1
10 TABLET, FILM COATED ORAL DAILY
Status: DISCONTINUED | OUTPATIENT
Start: 2019-01-04 | End: 2019-01-04 | Stop reason: HOSPADM

## 2019-01-03 RX ORDER — ROPINIROLE 1 MG/1
1 TABLET, FILM COATED ORAL NIGHTLY
Status: DISCONTINUED | OUTPATIENT
Start: 2019-01-03 | End: 2019-01-04 | Stop reason: HOSPADM

## 2019-01-03 RX ORDER — MORPHINE SULFATE 2 MG/ML
1 INJECTION, SOLUTION INTRAMUSCULAR; INTRAVENOUS EVERY 4 HOURS PRN
Status: DISCONTINUED | OUTPATIENT
Start: 2019-01-03 | End: 2019-01-04 | Stop reason: HOSPADM

## 2019-01-03 RX ORDER — SOTALOL HYDROCHLORIDE 80 MG/1
80 TABLET ORAL EVERY 12 HOURS SCHEDULED
Status: DISCONTINUED | OUTPATIENT
Start: 2019-01-03 | End: 2019-01-04 | Stop reason: HOSPADM

## 2019-01-03 RX ORDER — PANTOPRAZOLE SODIUM 40 MG/1
40 TABLET, DELAYED RELEASE ORAL 2 TIMES DAILY
Status: DISCONTINUED | OUTPATIENT
Start: 2019-01-03 | End: 2019-01-04 | Stop reason: HOSPADM

## 2019-01-03 RX ORDER — SODIUM CHLORIDE 0.9 % (FLUSH) 0.9 %
3 SYRINGE (ML) INJECTION EVERY 12 HOURS SCHEDULED
Status: DISCONTINUED | OUTPATIENT
Start: 2019-01-03 | End: 2019-01-04 | Stop reason: HOSPADM

## 2019-01-03 RX ORDER — NALOXONE HCL 0.4 MG/ML
0.4 VIAL (ML) INJECTION
Status: DISCONTINUED | OUTPATIENT
Start: 2019-01-03 | End: 2019-01-04 | Stop reason: HOSPADM

## 2019-01-03 RX ORDER — SODIUM CHLORIDE 0.9 % (FLUSH) 0.9 %
3-10 SYRINGE (ML) INJECTION AS NEEDED
Status: DISCONTINUED | OUTPATIENT
Start: 2019-01-03 | End: 2019-01-04 | Stop reason: HOSPADM

## 2019-01-03 RX ORDER — GABAPENTIN 100 MG/1
100 CAPSULE ORAL 3 TIMES DAILY
Status: DISCONTINUED | OUTPATIENT
Start: 2019-01-03 | End: 2019-01-04 | Stop reason: HOSPADM

## 2019-01-03 RX ORDER — SODIUM CHLORIDE 9 MG/ML
50 INJECTION, SOLUTION INTRAVENOUS CONTINUOUS
Status: DISCONTINUED | OUTPATIENT
Start: 2019-01-03 | End: 2019-01-04

## 2019-01-03 RX ORDER — SUCRALFATE 1 G/1
1 TABLET ORAL 4 TIMES DAILY
Status: DISCONTINUED | OUTPATIENT
Start: 2019-01-03 | End: 2019-01-04 | Stop reason: HOSPADM

## 2019-01-03 RX ADMIN — TRAZODONE HYDROCHLORIDE 50 MG: 50 TABLET ORAL at 22:01

## 2019-01-03 RX ADMIN — SODIUM CHLORIDE 50 ML/HR: 900 INJECTION, SOLUTION INTRAVENOUS at 22:06

## 2019-01-03 RX ADMIN — SUCRALFATE 1 G: 1 TABLET ORAL at 22:01

## 2019-01-03 RX ADMIN — SOTALOL HYDROCHLORIDE 80 MG: 80 TABLET ORAL at 22:02

## 2019-01-03 RX ADMIN — PANTOPRAZOLE SODIUM 40 MG: 40 TABLET, DELAYED RELEASE ORAL at 22:01

## 2019-01-03 RX ADMIN — GABAPENTIN 100 MG: 100 CAPSULE ORAL at 22:05

## 2019-01-03 RX ADMIN — ROPINIROLE 1 MG: 1 TABLET ORAL at 22:01

## 2019-01-03 RX ADMIN — SODIUM CHLORIDE, PRESERVATIVE FREE 3 ML: 5 INJECTION INTRAVENOUS at 22:03

## 2019-01-04 VITALS
WEIGHT: 159.7 LBS | BODY MASS INDEX: 25.07 KG/M2 | RESPIRATION RATE: 16 BRPM | OXYGEN SATURATION: 93 % | HEART RATE: 85 BPM | TEMPERATURE: 97.8 F | DIASTOLIC BLOOD PRESSURE: 63 MMHG | SYSTOLIC BLOOD PRESSURE: 152 MMHG | HEIGHT: 67 IN

## 2019-01-04 LAB
ALBUMIN SERPL-MCNC: 3.3 G/DL (ref 3.4–4.8)
ALBUMIN/GLOB SERPL: 1.2 G/DL (ref 1.1–1.8)
ALP SERPL-CCNC: 77 U/L (ref 38–126)
ALT SERPL W P-5'-P-CCNC: 12 U/L (ref 9–52)
ANION GAP SERPL CALCULATED.3IONS-SCNC: 4 MMOL/L (ref 5–15)
AST SERPL-CCNC: 21 U/L (ref 14–36)
BASOPHILS # BLD AUTO: 0.06 10*3/MM3 (ref 0–0.2)
BASOPHILS # BLD AUTO: 0.07 10*3/MM3 (ref 0–0.2)
BASOPHILS NFR BLD AUTO: 0.7 % (ref 0–2)
BASOPHILS NFR BLD AUTO: 0.9 % (ref 0–2)
BILIRUB SERPL-MCNC: 0.5 MG/DL (ref 0.2–1.3)
BUN BLD-MCNC: 10 MG/DL (ref 7–21)
BUN/CREAT SERPL: 12.7 (ref 7–25)
CALCIUM SPEC-SCNC: 8.7 MG/DL (ref 8.4–10.2)
CHLORIDE SERPL-SCNC: 103 MMOL/L (ref 95–110)
CO2 SERPL-SCNC: 31 MMOL/L (ref 22–31)
CREAT BLD-MCNC: 0.79 MG/DL (ref 0.5–1)
DEPRECATED RDW RBC AUTO: 46.8 FL (ref 36.4–46.3)
DEPRECATED RDW RBC AUTO: 48.9 FL (ref 36.4–46.3)
EOSINOPHIL # BLD AUTO: 0.19 10*3/MM3 (ref 0–0.7)
EOSINOPHIL # BLD AUTO: 0.23 10*3/MM3 (ref 0–0.7)
EOSINOPHIL NFR BLD AUTO: 2.5 % (ref 0–7)
EOSINOPHIL NFR BLD AUTO: 2.8 % (ref 0–7)
ERYTHROCYTE [DISTWIDTH] IN BLOOD BY AUTOMATED COUNT: 17.6 % (ref 11.5–14.5)
ERYTHROCYTE [DISTWIDTH] IN BLOOD BY AUTOMATED COUNT: 17.9 % (ref 11.5–14.5)
GFR SERPL CREATININE-BSD FRML MDRD: 70 ML/MIN/1.73 (ref 39–90)
GLOBULIN UR ELPH-MCNC: 2.7 GM/DL (ref 2.3–3.5)
GLUCOSE BLD-MCNC: 102 MG/DL (ref 60–100)
HCT VFR BLD AUTO: 29.2 % (ref 35–45)
HCT VFR BLD AUTO: 31 % (ref 35–45)
HGB BLD-MCNC: 8.2 G/DL (ref 12–15.5)
HGB BLD-MCNC: 8.9 G/DL (ref 12–15.5)
IMM GRANULOCYTES # BLD AUTO: 0.02 10*3/MM3 (ref 0–0.02)
IMM GRANULOCYTES # BLD AUTO: 0.02 10*3/MM3 (ref 0–0.02)
IMM GRANULOCYTES NFR BLD AUTO: 0.2 % (ref 0–0.5)
IMM GRANULOCYTES NFR BLD AUTO: 0.3 % (ref 0–0.5)
LYMPHOCYTES # BLD AUTO: 1.11 10*3/MM3 (ref 0.6–4.2)
LYMPHOCYTES # BLD AUTO: 1.27 10*3/MM3 (ref 0.6–4.2)
LYMPHOCYTES NFR BLD AUTO: 13.4 % (ref 10–50)
LYMPHOCYTES NFR BLD AUTO: 16.8 % (ref 10–50)
Lab: NORMAL
MCH RBC QN AUTO: 20.8 PG (ref 26.5–34)
MCH RBC QN AUTO: 20.9 PG (ref 26.5–34)
MCHC RBC AUTO-ENTMCNC: 28.1 G/DL (ref 31.4–36)
MCHC RBC AUTO-ENTMCNC: 28.7 G/DL (ref 31.4–36)
MCV RBC AUTO: 72.9 FL (ref 80–98)
MCV RBC AUTO: 73.9 FL (ref 80–98)
MONOCYTES # BLD AUTO: 0.89 10*3/MM3 (ref 0–0.9)
MONOCYTES # BLD AUTO: 0.95 10*3/MM3 (ref 0–0.9)
MONOCYTES NFR BLD AUTO: 11.5 % (ref 0–12)
MONOCYTES NFR BLD AUTO: 11.7 % (ref 0–12)
NEUTROPHILS # BLD AUTO: 5.14 10*3/MM3 (ref 2–8.6)
NEUTROPHILS # BLD AUTO: 5.91 10*3/MM3 (ref 2–8.6)
NEUTROPHILS NFR BLD AUTO: 67.8 % (ref 37–80)
NEUTROPHILS NFR BLD AUTO: 71.4 % (ref 37–80)
PLATELET # BLD AUTO: 247 10*3/MM3 (ref 150–450)
PLATELET # BLD AUTO: 250 10*3/MM3 (ref 150–450)
PMV BLD AUTO: 10.1 FL (ref 8–12)
PMV BLD AUTO: 10.1 FL (ref 8–12)
POTASSIUM BLD-SCNC: 4 MMOL/L (ref 3.5–5.1)
PROT SERPL-MCNC: 6 G/DL (ref 6.3–8.6)
RBC # BLD AUTO: 3.95 10*6/MM3 (ref 3.77–5.16)
RBC # BLD AUTO: 4.25 10*6/MM3 (ref 3.77–5.16)
SODIUM BLD-SCNC: 138 MMOL/L (ref 137–145)
WBC NRBC COR # BLD: 7.58 10*3/MM3 (ref 3.2–9.8)
WBC NRBC COR # BLD: 8.28 10*3/MM3 (ref 3.2–9.8)

## 2019-01-04 PROCEDURE — 85025 COMPLETE CBC W/AUTO DIFF WBC: CPT | Performed by: NURSE PRACTITIONER

## 2019-01-04 PROCEDURE — 80053 COMPREHEN METABOLIC PANEL: CPT | Performed by: FAMILY MEDICINE

## 2019-01-04 PROCEDURE — G0378 HOSPITAL OBSERVATION PER HR: HCPCS

## 2019-01-04 PROCEDURE — 85025 COMPLETE CBC W/AUTO DIFF WBC: CPT | Performed by: FAMILY MEDICINE

## 2019-01-04 RX ADMIN — GABAPENTIN 100 MG: 100 CAPSULE ORAL at 09:12

## 2019-01-04 RX ADMIN — SUCRALFATE 1 G: 1 TABLET ORAL at 11:08

## 2019-01-04 RX ADMIN — FERROUS SULFATE TAB EC 324 MG (65 MG FE EQUIVALENT) 324 MG: 324 (65 FE) TABLET DELAYED RESPONSE at 09:12

## 2019-01-04 RX ADMIN — FUROSEMIDE 20 MG: 20 TABLET ORAL at 09:12

## 2019-01-04 RX ADMIN — ATORVASTATIN CALCIUM 10 MG: 10 TABLET, FILM COATED ORAL at 09:12

## 2019-01-04 RX ADMIN — SOTALOL HYDROCHLORIDE 80 MG: 80 TABLET ORAL at 09:12

## 2019-01-04 RX ADMIN — PANTOPRAZOLE SODIUM 40 MG: 40 TABLET, DELAYED RELEASE ORAL at 09:12

## 2019-01-04 RX ADMIN — SUCRALFATE 1 G: 1 TABLET ORAL at 09:11

## 2019-01-04 NOTE — H&P
[address] [address]  T - [id number]     H&P         SUBJECTIVE:   Patient Care Team:  Charles Everett MD as PCP - General    Chief Complaint:     Chief Complaint   Patient presents with   • Abnormal Lab   • Anemia   • Weakness - Generalized   • Fatigue       Patient is 78 y.o. female presents with a past medical history of tobacco use, symptomatic anemia, restless leg syndrome, history of pulmonary embolism, paroxysmal atrial fibrillation, iron deficiency anemia hypertension, hyperlipidemia, history of stroke, COPD, was presented to the ED after having a lab drawn by her PCP which showed her hemoglobin had dropped to 7.8.  Patient presented to the ED with a complaint of generalized weakness and wants to sleep all the time, her hemoglobin was found to be at 7.0.  Patient has had a history of GI bleed in the past and has been transfused a few months ago.  Patient was supposed to follow-up with her vascular surgeon who had placed her on Plavix however she has not done so yet.  She has an appointment on 22nd of January with Dr. Grace in [place].  Patient also has a history of dementia, her daughter who is the guardian is present in the room I discussed the risk and benefit of transfusion with her she understand and agree with the transfusion.     HPI     ROS/HISTORY/ CURRENT MEDICATIONS/OBJECTIVE/VS/PE:   Review of Systems:   Review of Systems   Constitutional: Positive for activity change, appetite change and fatigue.   HENT: Negative for congestion and rhinorrhea.    Respiratory: Negative for cough, chest tightness, shortness of breath and wheezing.    Cardiovascular: Negative for chest pain, palpitations and leg swelling.   Gastrointestinal: Negative for abdominal pain, anal bleeding, constipation, diarrhea, nausea and vomiting.   Genitourinary: Negative for dysuria and frequency.   Neurological: Positive for weakness. Negative for dizziness and  light-headedness.   Psychiatric/Behavioral: Negative for agitation and confusion.       History:     Past Medical History:   Diagnosis Date   • Atherosclerosis    • Chronic blood loss anemia    • COPD (chronic obstructive pulmonary disease) (CMS/HCC)    • Depression    • Emphysema of lung (CMS/HCC)    • History of transfusion    • Hypertension    • Osteoporosis    • Paroxysmal atrial fibrillation (CMS/HCC)    • PVD (peripheral vascular disease) (CMS/HCC)    • Stroke (CMS/HCC)      Past Surgical History:   Procedure Laterality Date   • APPENDECTOMY     • CEREBRAL ANEURYSM REPAIR     • CHOLECYSTECTOMY     • COLONOSCOPY N/A 5/9/2017   • COLONOSCOPY N/A 6/27/2018    Procedure: COLONOSCOPY;  Surgeon: Bhaskar Dickson DO;  Location: [facility] ENDOSCOPY;  Service: Gastroenterology   • ENDOSCOPY N/A 5/9/2017   • ENDOSCOPY N/A 6/27/2018    Procedure: ESOPHAGOGASTRODUODENOSCOPY;  Surgeon: Bhaskar Dickson DO;  Location: [facility] ENDOSCOPY;  Service: Gastroenterology   • HYSTERECTOMY       Family History   Problem Relation Age of Onset   • Hypertension Father      Social History     Tobacco Use   • Smoking status: Current Every Day Smoker     Packs/day: 1.00     Years: 60.00     Pack years: 60.00     Types: Cigarettes   • Smokeless tobacco: Never Used   Substance Use Topics   • Alcohol use: No   • Drug use: No       (Not in a hospital admission)  Allergies:  Iodides and Penicillins    Current Medications:     No current facility-administered medications for this encounter.      Current Outpatient Medications   Medication Sig Dispense Refill   • clopidogrel (PLAVIX) 75 MG tablet Take 75 mg by mouth Daily.     • ferrous sulfate 325 (65 FE) MG tablet Take 1 tablet by mouth Daily With Breakfast. 30 tablet 0   • furosemide (LASIX) 20 MG tablet Take 1 tablet by mouth 3 (Three) Times a Week. Monday, Wednesday, Friday 12 tablet 6   • gabapentin (NEURONTIN) 100 MG capsule Take 100 mg by mouth 3 (Three) Times a Day.     • Nutritional  Supplements (ENSURE ACTIVE) liquid Take  by mouth.     • pantoprazole (PROTONIX) 40 MG EC tablet Take 1 tablet by mouth 2 (Two) Times a Day. 30 tablet 0   • rOPINIRole (REQUIP) 1 MG tablet Take 1 mg by mouth Every Night. Take 1 hour before bedtime.     • simvastatin (ZOCOR) 20 MG tablet Take 20 mg by mouth Every Night.     • sotalol (BETAPACE) 80 MG tablet Take 80 mg by mouth 2 (Two) Times a Day.     • sucralfate (CARAFATE) 1 g tablet Take 1 tablet by mouth 4 (Four) Times a Day. 40 tablet 0   • traZODone (DESYREL) 50 MG tablet Take 50 mg by mouth Every Night.         Physical Exam:     Vital Sign Min/Max for last 24 hours  Temp  Min: 97.3 °F (36.3 °C)  Max: 97.3 °F (36.3 °C)   BP  Min: 132/87  Max: 139/60   Pulse  Min: 104  Max: 123   Resp  Min: 20  Max: 20   SpO2  Min: 94 %  Max: 98 %   No Data Recorded   Weight  Min: 72.4 kg (159 lb 11.2 oz)  Max: 72.4 kg (159 lb 11.2 oz)       Physical Exam:    Physical Exam   Constitutional: She appears well-developed and well-nourished.   HENT:   Head: Normocephalic and atraumatic.   Eyes: EOM are normal. Pupils are equal, round, and reactive to light.   Neck: Normal range of motion.   Cardiovascular: Normal rate, regular rhythm and normal heart sounds.   Pulmonary/Chest: Effort normal and breath sounds normal.   Abdominal: Soft. Bowel sounds are normal.   Musculoskeletal: Normal range of motion.   Neurological: She is alert.   Skin: Skin is warm.   Psychiatric: She has a normal mood and affect. Her behavior is normal.   Vitals reviewed.       Results Review:   Lab Results (last 24 hours)     Procedure Component Value Units Date/Time    CBC & Differential [[id number]] Collected:  01/03/19 1741    Specimen:  Blood Updated:  01/03/19 1925    Narrative:       The following orders were created for panel order CBC & Differential.  Procedure                               Abnormality         Status                     ---------                               -----------         ------                      Manual Differential[[id number]]          Abnormal            Final result               CBC Auto Differential[[id number]]        Abnormal            Final result                 Please view results for these tests on the individual orders.    Manual Differential [[id number]]  (Abnormal) Collected:  01/03/19 1741    Specimen:  Blood Updated:  01/03/19 1925     Neutrophil % 72.0 %      Lymphocyte % 9.0 %      Monocyte % 11.0 %      Eosinophil % 5.0 %      Basophil % 1.0 %      Bands %  1.0 %      Metamyelocyte % 1.0 %      Neutrophils Absolute 6.55 10*3/mm3      Lymphocytes Absolute 0.81 10*3/mm3      Monocytes Absolute 0.99 10*3/mm3      Eosinophils Absolute 0.45 10*3/mm3      Basophils Absolute 0.09 10*3/mm3      Anisocytosis Mod/2+     Hypochromia Mod/2+     Polychromasia Slight/1+     WBC Morphology Normal     Platelet Morphology Normal    aPTT [[id number]]  (Normal) Collected:  01/03/19 1741    Specimen:  Blood Updated:  01/03/19 1908     PTT 27.6 seconds     Narrative:       The recommended Heparin therapeutic range is 68-97 seconds.    Protime-INR [[id number]]  (Normal) Collected:  01/03/19 1741    Specimen:  Blood Updated:  01/03/19 1908     Protime 14.9 Seconds      INR 1.20    Narrative:       Therapeutic range for most indications is 2.0-3.0 INR,  or 2.5-3.5 for mechanical heart valves.    [place] Draw [[id number]] Collected:  01/03/19 1741    Specimen:  Blood Updated:  01/03/19 1845    Narrative:       The following orders were created for panel order [place] Draw.  Procedure                               Abnormality         Status                     ---------                               -----------         ------                     Light Blue Top[[id number]]                                   Final result               Green Top (Gel)[[id number]]                                  Final result               Lavender Top[[id number]]                                     Final result                Gold Top - SST[[id number]]                                   Final result                 Please view results for these tests on the individual orders.    Light Blue Top [[id number]] Collected:  01/03/19 1741    Specimen:  Blood Updated:  01/03/19 1845     Extra Tube hold for add-on     Comment: Auto resulted       Green Top (Gel) [[id number]] Collected:  01/03/19 1741    Specimen:  Blood Updated:  01/03/19 1845     Extra Tube Hold for add-ons.     Comment: Auto resulted.       Lavender Top [[id number]] Collected:  01/03/19 1741    Specimen:  Blood Updated:  01/03/19 1845     Extra Tube hold for add-on     Comment: Auto resulted       Gold Top - SST [[id number]] Collected:  01/03/19 1741    Specimen:  Blood Updated:  01/03/19 1845     Extra Tube Hold for add-ons.     Comment: Auto resulted.       Comprehensive Metabolic Panel [[id number]]  (Abnormal) Collected:  01/03/19 1741    Specimen:  Blood Updated:  01/03/19 1838     Glucose 100 mg/dL      BUN 11 mg/dL      Creatinine 0.73 mg/dL      Sodium 135 mmol/L      Potassium 3.7 mmol/L      Chloride 98 mmol/L      CO2 31.0 mmol/L      Calcium 8.9 mg/dL      Total Protein 6.5 g/dL      Albumin 3.70 g/dL      ALT (SGPT) 14 U/L      AST (SGOT) 31 U/L      Alkaline Phosphatase 81 U/L      Total Bilirubin 0.5 mg/dL      eGFR Non African Amer 77 mL/min/1.73      Globulin 2.8 gm/dL      A/G Ratio 1.3 g/dL      BUN/Creatinine Ratio 15.1     Anion Gap 6.0 mmol/L     Narrative:       The MDRD GFR formula is only valid for adults with stable renal function between ages 18 and 70.    CBC Auto Differential [[id number]]  (Abnormal) Collected:  01/03/19 1741    Specimen:  Blood Updated:  01/03/19 1811     WBC 8.97 10*3/mm3      RBC 3.60 10*6/mm3      Hemoglobin 7.0 g/dL      Comment: DELTA CHECK FROM 6/2018         Hematocrit 25.4 %      MCV 70.6 fL      MCH 19.4 pg      MCHC 27.6 g/dL      RDW 17.6 %      RDW-SD 45.2 fl      MPV 10.2 fL      Platelets 281 10*3/mm3               Imaging  Results (last 24 hours)     ** No results found for the last 24 hours. **           I reviewed the patient's new clinical results.  I reviewed the patient's new imaging results and agree with the interpretation.     ASSESSMENT/PLAN:   Assessment/Plan   Active Hospital Problems    Diagnosis Date Noted   • Symptomatic anemia [D64.9] 06/19/2018   • Anemia [D64.9] 03/23/2017   • Paroxysmal atrial fibrillation (CMS/[facility]) [I48.0] 03/23/2017   • Hypertension [I10] 03/23/2017   • History of stroke [Z86.73] 03/23/2017   • COPD (chronic obstructive pulmonary disease) (CMS/[facility]) [J44.9] 03/23/2017   • Cerebral embolism with cerebral infarction (CMS/[facility]) [I63.40] 02/23/2017   • Tobacco use [Z72.0] 07/25/2016   • Iron deficiency anemia [D50.9] 04/26/2016   • Restless legs syndrome [G25.81] 05/20/2013   • Hyperlipidemia [E78.5] 02/15/2012   • Pulmonary embolism without acute cor pulmonale (CMS/HCC) [I26.99] 09/23/2010     1. Symptomatic anemia: Hb is at 7.0, discussed with daughter who is POA is present at the bedside, risk and benefit of transfusion have been discussed. Will give her 1 unit of RBC. Get GI consult if required.   2. A. Fib: rate control. Not on any anticoagulation, due to GI bleed.   3. HTN: stable, continue to monitor.   4. History of PE: not on any anticoagulation. Doing well.     DVT ppx: SCD    I discussed the patient's findings and my recommendations with patient and family.              This document has been electronically signed by Robbin Spicer MD on January 3, 2019 7:43 PM

## 2019-01-04 NOTE — ED PROVIDER NOTES
Subjective   Pt, hx of chronic anemia, GI bleed, and angiodysplasia of colon, had blood work done at PCP office yesterday, showed low hemoglobin, and advised pt to go to ED for further evaluation. Pt reports chronic weakness for several months. Last colonoscopy was done in June 2018.        History provided by:  Patient   used: No    Weakness - Generalized   Severity:  Moderate  Onset quality:  Gradual  Timing:  Constant  Progression:  Unchanged  Chronicity:  Recurrent  Relieved by:  Nothing  Worsened by:  Nothing  Associated symptoms: no cough, no shortness of breath and no vomiting        Review of Systems   Constitutional: Negative for fatigue.   HENT: Negative for congestion.    Respiratory: Negative for cough and shortness of breath.    Gastrointestinal: Negative for vomiting.   Endocrine: Negative for polyuria.   Skin: Negative for color change.   Neurological: Negative for syncope.   Psychiatric/Behavioral: Negative for agitation.   All other systems reviewed and are negative.      Past Medical History:   Diagnosis Date   • Atherosclerosis    • Chronic blood loss anemia    • COPD (chronic obstructive pulmonary disease) (CMS/HCC)    • Depression    • Emphysema of lung (CMS/HCC)    • History of transfusion    • Hypertension    • Osteoporosis    • Paroxysmal atrial fibrillation (CMS/HCC)    • PVD (peripheral vascular disease) (CMS/HCC)    • Stroke (CMS/HCC)        Allergies   Allergen Reactions   • Iodides Rash   • Penicillins Rash       Past Surgical History:   Procedure Laterality Date   • APPENDECTOMY     • CEREBRAL ANEURYSM REPAIR     • CHOLECYSTECTOMY     • COLONOSCOPY N/A 5/9/2017   • COLONOSCOPY N/A 6/27/2018    Procedure: COLONOSCOPY;  Surgeon: Bhaskar Dickson DO;  Location: [facility] ENDOSCOPY;  Service: Gastroenterology   • ENDOSCOPY N/A 5/9/2017   • ENDOSCOPY N/A 6/27/2018    Procedure: ESOPHAGOGASTRODUODENOSCOPY;  Surgeon: Bhaskar Dickson DO;  Location: [facility] ENDOSCOPY;   Service: Gastroenterology   • HYSTERECTOMY         Family History   Problem Relation Age of Onset   • Hypertension Father        Social History     Socioeconomic History   • Marital status:      Spouse name: Not on file   • Number of children: Not on file   • Years of education: Not on file   • Highest education level: Not on file   Tobacco Use   • Smoking status: Current Every Day Smoker     Packs/day: 1.00     Years: 60.00     Pack years: 60.00     Types: Cigarettes   • Smokeless tobacco: Never Used   Substance and Sexual Activity   • Alcohol use: No   • Drug use: No   • Sexual activity: Not Currently           Objective   Physical Exam   Constitutional: She is oriented to person, place, and time. She appears well-developed and well-nourished.   HENT:   Head: Normocephalic.   Right Ear: Hearing normal.   Left Ear: Hearing normal.   Nose: Nose normal.   Eyes: Conjunctivae, EOM and lids are normal.   Neck: Trachea normal and full passive range of motion without pain.   Cardiovascular: Regular rhythm, S1 normal, S2 normal, normal heart sounds and normal pulses.   Pulmonary/Chest: Effort normal and breath sounds normal.   Abdominal: Normal appearance and bowel sounds are normal.   Genitourinary: Rectal exam shows guaiac negative stool.   Neurological: She is alert and oriented to person, place, and time. She is not disoriented.   Skin: Skin is warm and dry. There is pallor.   Psychiatric: She has a normal mood and affect. Her speech is normal and behavior is normal. Thought content normal.   Nursing note and vitals reviewed.      Procedures           ED Course      6:30P  Obtained stool for hemoccult test 2x. Both exams were hemoccult negative.    7:00P  Discussed with Dr. PAULINA Spicer about pt's condition via phone. Agrees to admit pt and he spoke with Dr. Dickson about consult. All questions addressed at this time.             MDM      Final diagnoses:   Symptomatic anemia            Chrissie Marin PA-C  01/03/19  [id number]

## 2019-01-04 NOTE — DISCHARGE SUMMARY
[facility] Medicine Services  DISCHARGE SUMMARY       Date of Admission: 1/3/2019  Date of Discharge:  1/4/2019  Primary Care Physician: Charles Everett MD    Presenting Problem/History of Present Illness:  Anemia [D64.9]  Symptomatic anemia [D64.9]     Final Discharge Diagnoses:  Active Hospital Problems    Diagnosis   • Symptomatic anemia   • Anemia   • Paroxysmal atrial fibrillation (CMS/HCC)   • Hypertension   • History of stroke   • COPD (chronic obstructive pulmonary disease) (CMS/HCC)   • Cerebral embolism with cerebral infarction (CMS/HCC)   • Tobacco use   • Iron deficiency anemia   • Restless legs syndrome   • Hyperlipidemia   • Pulmonary embolism without acute cor pulmonale (CMS/HCC)       Consults:   Consults     No orders found for last 30 day(s).        Pertinent Test Results:   Lab Results (last 24 hours)     Procedure Component Value Units Date/Time    CBC & Differential [[id number]] Collected:  01/04/19 1246    Specimen:  Blood Updated:  01/04/19 1319    Narrative:       The following orders were created for panel order CBC & Differential.  Procedure                               Abnormality         Status                     ---------                               -----------         ------                     Scan Slide[[id number]]                                       In process                 CBC Auto Differential[[id number]]        Abnormal            Final result                 Please view results for these tests on the individual orders.    CBC Auto Differential [[id number]]  (Abnormal) Collected:  01/04/19 1246    Specimen:  Blood Updated:  01/04/19 1319     WBC 8.28 10*3/mm3      RBC 4.25 10*6/mm3      Hemoglobin 8.9 g/dL      Hematocrit 31.0 %      MCV 72.9 fL      MCH 20.9 pg      MCHC 28.7 g/dL      RDW 17.6 %      RDW-SD 46.8 fl      MPV 10.1 fL      Platelets 250 10*3/mm3      Neutrophil % 71.4 %      Lymphocyte % 13.4 %      Monocyte % 11.5 %       Eosinophil % 2.8 %      Basophil % 0.7 %      Immature Grans % 0.2 %      Neutrophils, Absolute 5.91 10*3/mm3      Lymphocytes, Absolute 1.11 10*3/mm3      Monocytes, Absolute 0.95 10*3/mm3      Eosinophils, Absolute 0.23 10*3/mm3      Basophils, Absolute 0.06 10*3/mm3      Immature Grans, Absolute 0.02 10*3/mm3     Scan Slide [[id number]] Collected:  01/04/19 1246    Specimen:  Blood Updated:  01/04/19 1258    Comprehensive Metabolic Panel [[id number]]  (Abnormal) Collected:  01/04/19 0606    Specimen:  Blood Updated:  01/04/19 0710     Glucose 102 mg/dL      BUN 10 mg/dL      Creatinine 0.79 mg/dL      Sodium 138 mmol/L      Potassium 4.0 mmol/L      Chloride 103 mmol/L      CO2 31.0 mmol/L      Calcium 8.7 mg/dL      Total Protein 6.0 g/dL      Albumin 3.30 g/dL      ALT (SGPT) 12 U/L      AST (SGOT) 21 U/L      Alkaline Phosphatase 77 U/L      Total Bilirubin 0.5 mg/dL      eGFR Non African Amer 70 mL/min/1.73      Globulin 2.7 gm/dL      A/G Ratio 1.2 g/dL      BUN/Creatinine Ratio 12.7     Anion Gap 4.0 mmol/L     Narrative:       The MDRD GFR formula is only valid for adults with stable renal function between ages 18 and 70.    CBC & Differential [[id number]] Collected:  01/04/19 0606    Specimen:  Blood Updated:  01/04/19 0655    Narrative:       The following orders were created for panel order CBC & Differential.  Procedure                               Abnormality         Status                     ---------                               -----------         ------                     Scan Slide[[id number]]                                                                  CBC Auto Differential[[id number]]        Abnormal            Final result                 Please view results for these tests on the individual orders.    CBC Auto Differential [[id number]]  (Abnormal) Collected:  01/04/19 0606    Specimen:  Blood Updated:  01/04/19 0655     WBC 7.58 10*3/mm3      RBC 3.95 10*6/mm3      Hemoglobin 8.2 g/dL       Hematocrit 29.2 %      MCV 73.9 fL      MCH 20.8 pg      MCHC 28.1 g/dL      RDW 17.9 %      RDW-SD 48.9 fl      MPV 10.1 fL      Platelets 247 10*3/mm3      Neutrophil % 67.8 %      Lymphocyte % 16.8 %      Monocyte % 11.7 %      Eosinophil % 2.5 %      Basophil % 0.9 %      Immature Grans % 0.3 %      Neutrophils, Absolute 5.14 10*3/mm3      Lymphocytes, Absolute 1.27 10*3/mm3      Monocytes, Absolute 0.89 10*3/mm3      Eosinophils, Absolute 0.19 10*3/mm3      Basophils, Absolute 0.07 10*3/mm3      Immature Grans, Absolute 0.02 10*3/mm3     Extra Tubes [[id number]] Collected:  01/03/19 2043    Specimen:  Blood, Venous Line Updated:  01/03/19 2145    Narrative:       The following orders were created for panel order Extra Tubes.  Procedure                               Abnormality         Status                     ---------                               -----------         ------                     Lavender Top[[id number]]                                     Final result                 Please view results for these tests on the individual orders.    Lavender Top [[id number]] Collected:  01/03/19 2043    Specimen:  Blood Updated:  01/03/19 2145     Extra Tube hold for add-on     Comment: Auto resulted       CBC & Differential [[id number]] Collected:  01/03/19 1741    Specimen:  Blood Updated:  01/03/19 1925    Narrative:       The following orders were created for panel order CBC & Differential.  Procedure                               Abnormality         Status                     ---------                               -----------         ------                     Manual Differential[[id number]]          Abnormal            Final result               CBC Auto Differential[[id number]]        Abnormal            Final result                 Please view results for these tests on the individual orders.    Manual Differential [[id number]]  (Abnormal) Collected:  01/03/19 1741    Specimen:  Blood Updated:  01/03/19  1925     Neutrophil % 72.0 %      Lymphocyte % 9.0 %      Monocyte % 11.0 %      Eosinophil % 5.0 %      Basophil % 1.0 %      Bands %  1.0 %      Metamyelocyte % 1.0 %      Neutrophils Absolute 6.55 10*3/mm3      Lymphocytes Absolute 0.81 10*3/mm3      Monocytes Absolute 0.99 10*3/mm3      Eosinophils Absolute 0.45 10*3/mm3      Basophils Absolute 0.09 10*3/mm3      Anisocytosis Mod/2+     Hypochromia Mod/2+     Polychromasia Slight/1+     WBC Morphology Normal     Platelet Morphology Normal    aPTT [[id number]]  (Normal) Collected:  01/03/19 1741    Specimen:  Blood Updated:  01/03/19 1908     PTT 27.6 seconds     Narrative:       The recommended Heparin therapeutic range is 68-97 seconds.    Protime-INR [[id number]]  (Normal) Collected:  01/03/19 1741    Specimen:  Blood Updated:  01/03/19 1908     Protime 14.9 Seconds      INR 1.20    Narrative:       Therapeutic range for most indications is 2.0-3.0 INR,  or 2.5-3.5 for mechanical heart valves.    [place] Draw [[id number]] Collected:  01/03/19 1741    Specimen:  Blood Updated:  01/03/19 1845    Narrative:       The following orders were created for panel order [place] Draw.  Procedure                               Abnormality         Status                     ---------                               -----------         ------                     Light Blue Top[[id number]]                                   Final result               Green Top (Gel)[[id number]]                                  Final result               Lavender Top[[id number]]                                     Final result               Gold Top - SST[[id number]]                                   Final result                 Please view results for these tests on the individual orders.    Light Blue Top [[id number]] Collected:  01/03/19 1741    Specimen:  Blood Updated:  01/03/19 1845     Extra Tube hold for add-on     Comment: Auto resulted       Green Top (Gel) [[id number]] Collected:  01/03/19 1741     Specimen:  Blood Updated:  01/03/19 1845     Extra Tube Hold for add-ons.     Comment: Auto resulted.       Lavender Top [[id number]] Collected:  01/03/19 1741    Specimen:  Blood Updated:  01/03/19 1845     Extra Tube hold for add-on     Comment: Auto resulted       Gold Top - SST [[id number]] Collected:  01/03/19 1741    Specimen:  Blood Updated:  01/03/19 1845     Extra Tube Hold for add-ons.     Comment: Auto resulted.       Comprehensive Metabolic Panel [[id number]]  (Abnormal) Collected:  01/03/19 1741    Specimen:  Blood Updated:  01/03/19 1838     Glucose 100 mg/dL      BUN 11 mg/dL      Creatinine 0.73 mg/dL      Sodium 135 mmol/L      Potassium 3.7 mmol/L      Chloride 98 mmol/L      CO2 31.0 mmol/L      Calcium 8.9 mg/dL      Total Protein 6.5 g/dL      Albumin 3.70 g/dL      ALT (SGPT) 14 U/L      AST (SGOT) 31 U/L      Alkaline Phosphatase 81 U/L      Total Bilirubin 0.5 mg/dL      eGFR Non African Amer 77 mL/min/1.73      Globulin 2.8 gm/dL      A/G Ratio 1.3 g/dL      BUN/Creatinine Ratio 15.1     Anion Gap 6.0 mmol/L     Narrative:       The MDRD GFR formula is only valid for adults with stable renal function between ages 18 and 70.    CBC Auto Differential [[id number]]  (Abnormal) Collected:  01/03/19 1741    Specimen:  Blood Updated:  01/03/19 1811     WBC 8.97 10*3/mm3      RBC 3.60 10*6/mm3      Hemoglobin 7.0 g/dL      Comment: DELTA CHECK FROM 6/2018         Hematocrit 25.4 %      MCV 70.6 fL      MCH 19.4 pg      MCHC 27.6 g/dL      RDW 17.6 %      RDW-SD 45.2 fl      MPV 10.2 fL      Platelets 281 10*3/mm3         Imaging Results (last 24 hours)     ** No results found for the last 24 hours. **        Chief Complaint on Day of Discharge: No complaints    Hospital Course:  This is a 78-year-old  female with past medical history of anemia, restless leg syndrome, history of pulmonary embolism, paroxysmal atrial fibrillation, iron deficiency, hypertension, hyperlipidemia, history of  "stroke and COPD that presented to [facility] on 1/3/2019 after receiving results of her hemoglobin from her primary care physician office.  On admission, her hemoglobin was found to be at 7.  The patient's daughter states that her mother has chronic AVMs and periodically requires blood transfusions.  She follows with a gastroenterologist (Dr. Grace) in [place].  The patient received 1 unit of packed red blood cells and her hemoglobin was 8.2 this a.m.  On recheck this afternoon hemoglobin was at 8.9.  The patient will follow-up with Dr. Everett on Monday with a CBC recheck.  She is going to discuss setting up outpatient transfusions through the [facility] at that time.      Condition on Discharge:  Stable    Physical Exam on Discharge:  /63 (BP Location: Left arm, Patient Position: Lying)   Pulse 85   Temp 97.8 °F (36.6 °C) (Oral)   Resp 16   Ht 170.2 cm (67\")   Wt 72.4 kg (159 lb 11.2 oz)   SpO2 93%   BMI 25.01 kg/m²   Physical Exam   Constitutional: She is oriented to person, place, and time. She appears well-developed and well-nourished.   HENT:   Head: Normocephalic and atraumatic.   Eyes: EOM are normal. Pupils are equal, round, and reactive to light.   Neck: Normal range of motion. Neck supple.   Cardiovascular: Normal rate and regular rhythm.   Pulmonary/Chest: Effort normal and breath sounds normal.   Abdominal: Soft. Bowel sounds are normal.   Musculoskeletal: Normal range of motion.   Neurological: She is alert and oriented to person, place, and time.   Skin: Skin is warm and dry.   Psychiatric: She has a normal mood and affect. Her behavior is normal.     Discharge Disposition:  Home or Self Care    Discharge Medications:     Discharge Medications      Continue These Medications      Instructions Start Date   BETAPACE 80 MG tablet  Generic drug:  sotalol  Notes to patient:  Next dose due 1/4/19 @ 2100   80 mg, Oral, 2 Times Daily      clopidogrel 75 MG tablet  Commonly known as:  " PLAVIX  Notes to patient:  Next dose due 1/5/19 @ 0900   75 mg, Oral, Daily      ENSURE ACTIVE liquid  Notes to patient:  As directed   Oral      ferrous sulfate 325 (65 FE) MG tablet  Notes to patient:  Next dose due 1/5/19 @ 0900   325 mg, Oral, Daily With Breakfast      furosemide 20 MG tablet  Commonly known as:  LASIX  Notes to patient:  Next dose due Monday 1/7/19 @ 0900   20 mg, Oral, 3 Times Weekly, Monday, Wednesday, Friday      gabapentin 100 MG capsule  Commonly known as:  NEURONTIN  Notes to patient:  Next dose due 1/4/19 @ 1600   100 mg, Oral, 3 Times Daily      pantoprazole 40 MG EC tablet  Commonly known as:  PROTONIX  Notes to patient:  Next dose due 1/4/19 @ 2100   40 mg, Oral, 2 Times Daily      rOPINIRole 1 MG tablet  Commonly known as:  REQUIP  Notes to patient:  Next dose due 1/4/19 @ 2100   1 mg, Oral, Nightly, Take 1 hour before bedtime.       simvastatin 20 MG tablet  Commonly known as:  ZOCOR  Notes to patient:  Next dose due 1/4/19 @ 2100   20 mg, Oral, Nightly      sucralfate 1 g tablet  Commonly known as:  CARAFATE  Notes to patient:  Next dose due 1/4/19 @ 1600   1 g, Oral, 4 Times Daily      traZODone 50 MG tablet  Commonly known as:  DESYREL  Notes to patient:  Next dose due 1/4/19 @ 2100   50 mg, Oral, Nightly             Discharge Diet:   Diet Instructions     Diet: Cardiac      Discharge Diet:  Cardiac          Activity at Discharge:   Activity Instructions     Activity as Tolerated            Discharge Care Plan/Instructions: As above    Follow-up Appointments:   No future appointments.    Test Results Pending at Discharge:    Order Current Status    CBC & Differential In process    Scan Slide In process            This document has been electronically signed by BRAYDEN Chávez on January 4, 2019 4:23 PM        Time: Greater than 30 minutes.

## 2019-01-04 NOTE — PLAN OF CARE
Problem: Patient Care Overview  Goal: Plan of Care Review  Outcome: Ongoing (interventions implemented as appropriate)   01/04/19 0055   Coping/Psychosocial   Plan of Care Reviewed With patient;family   Plan of Care Review   Progress no change   OTHER   Outcome Summary Pt currently resting, just received her first unit on blood. Will continue to monitor     Goal: Individualization and Mutuality  Outcome: Ongoing (interventions implemented as appropriate)    Goal: Discharge Needs Assessment  Outcome: Ongoing (interventions implemented as appropriate)      Problem: Fall Risk (Adult)  Goal: Identify Related Risk Factors and Signs and Symptoms  Outcome: Outcome(s) achieved Date Met: 01/04/19    Goal: Absence of Fall  Outcome: Outcome(s) achieved Date Met: 01/04/19      Problem: Anemia (Adult)  Goal: Identify Related Risk Factors and Signs and Symptoms  Outcome: Outcome(s) achieved Date Met: 01/04/19    Goal: Symptom Improvement  Outcome: Ongoing (interventions implemented as appropriate)      Problem: Arrhythmia/Dysrhythmia (Symptomatic) (Adult)  Goal: Signs and Symptoms of Listed Potential Problems Will be Absent, Minimized or Managed (Arrhythmia/Dysrhythmia)  Outcome: Ongoing (interventions implemented as appropriate)

## 2019-01-05 ENCOUNTER — READMISSION MANAGEMENT (OUTPATIENT)
Dept: CALL CENTER | Facility: HOSPITAL | Age: 79
End: 2019-01-05

## 2019-01-05 LAB
ABO + RH BLD: NORMAL
BH BB BLOOD EXPIRATION DATE: NORMAL
BH BB BLOOD TYPE BARCODE: 6200
BH BB DISPENSE STATUS: NORMAL
BH BB PRODUCT CODE: NORMAL
BH BB UNIT NUMBER: NORMAL
UNIT  ABO: NORMAL
UNIT  RH: NORMAL

## 2019-01-05 NOTE — OUTREACH NOTE
Prep Survey      Responses   Facility patient discharged from?  [facility]   Is patient eligible?  No   What are the reasons patient is not eligible?  Other   Does the patient have one of the following disease processes/diagnoses(primary or secondary)?  Other   Prep survey completed?  Yes          Tiffanie Sebastian RN

## 2021-03-15 ENCOUNTER — BULK ORDERING (OUTPATIENT)
Dept: CASE MANAGEMENT | Facility: OTHER | Age: 81
End: 2021-03-15

## 2021-03-15 DIAGNOSIS — Z23 IMMUNIZATION DUE: ICD-10-CM

## 2023-09-14 ENCOUNTER — OFFICE VISIT (OUTPATIENT)
Dept: PALLIATIVE CARE | Facility: CLINIC | Age: 83
End: 2023-09-14
Payer: MEDICARE

## 2023-09-14 VITALS
OXYGEN SATURATION: 93 % | WEIGHT: 169 LBS | BODY MASS INDEX: 26.47 KG/M2 | HEART RATE: 106 BPM | DIASTOLIC BLOOD PRESSURE: 82 MMHG | RESPIRATION RATE: 16 BRPM | SYSTOLIC BLOOD PRESSURE: 117 MMHG

## 2023-09-14 DIAGNOSIS — R63.4 WEIGHT LOSS, UNINTENTIONAL: ICD-10-CM

## 2023-09-14 DIAGNOSIS — Z71.89 COUNSELING REGARDING ADVANCE CARE PLANNING AND GOALS OF CARE: Primary | ICD-10-CM

## 2023-09-14 DIAGNOSIS — F01.518 VASCULAR DEMENTIA WITH BEHAVIORAL DISTURBANCE: ICD-10-CM

## 2023-09-14 RX ORDER — MIRTAZAPINE 15 MG/1
15 TABLET, FILM COATED ORAL NIGHTLY
Qty: 90 TABLET | Refills: 0 | Status: SHIPPED | OUTPATIENT
Start: 2023-09-14

## 2023-09-14 NOTE — PROGRESS NOTES
[facility]   Palliative Care Services      Patient Name: Sarah Layton  Age: 83 y.o.  : 1940  MRN: [id number]  Today's Date: 23     There are no questions and answers to display.        Subjective     History of Present Illness:   Sarah Layton is a 83 y.o. female with past medical history of vascular dementia, CVA, restless leg syndrome, COPD, depression, anemia, hyperlipidemia, hypertension, and paroxysmal atrial fibrillation who presents to the palliative clinic today to establish care.  Patient was referred by BRAYDEN Talbert for vascular dementia.  Patient's daughter, Keli, who accompanies her today, reports patient had CVA in 2017 and subsequently developed vascular dementia.  Keli states patient had no other deficits following CVA.  Her confusion worsens in the evenings due to ing.  Patient was hospitalized at [facility] from 5/3/2023-2023 due to a fall, weakness, altered mental status, and vascular dementia.  She was admitted to [facility] for rehab from 2023-6/10/2023.  Keli reports patient's functional status is improved since rehab, but she is not back to baseline before hospitalization.  Patient lives with Keli in her home in [place].  Keli reports patient has caregivers Monday-Thursday through [facility].  She reports they utilize in-home cameras to monitor patient and ensure her safety.  Patient ambulates with a walker at home, although she is in a wheelchair today.  She requires some assistance with ADLs.  She is incontinent of urine and wears Depends.  Keli states patient's appetite is good and denies issues with nutrition and hydration.  Keli reports patient's appetite was poor while she was at [facility].  Denies any weight loss.  Reports she sleeps well at night and takes frequent naps during the day.  She is currently taking Remeron 7.5mg daily, which seems to be working well.  " She also takes Zoloft 50mg daily.  She follows with Dr. Everett for primary care and her next follow up is on 8/7/2023.  She previously followed with cardiology at [facility], but Dr. Everett now manages her atrial fibrillation.  Keli states patient is compliant with taking all of her medications.  She has not seen a neurologist in several years.  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  Reports she wears 2.5 L O2 at home.  She does not have O2 on today.  She is here today to establish care.  She is accompanied by her daughter, Keli.     Interval History:  Patient is here today for follow-up and is accompanied by her daughter/guardian, Keli.  Patient appears to be more irritable today.  Keli states patient's mood varies day by day.  Keli reports that patient's appetite has slightly decreased.  She reports patient \"grazes\" and eats small snacks, particularly junk food, throughout the day, but does not eat meals.  Patient has lost six pounds since her last visit.  She denies nausea, vomiting, and diarrhea.  She reports abdominal pain secondary to heartburn.  Keli states patient refused to take her Carafate earlier this morning and finally took medication prior to this appointment.  Keli reports patient intermittently refuses to take her medications.  Patient follows with Dr. Everett for primary care.  Per Dr. Everett's last office note on 8/7/2023, Zoloft was discontinued.  Keli is unsure why this medication was stopped.  Keli states patient is sleeping well at night and also taking frequent naps during the day.  Keli reports patient's mobility has improved since her last visit and is now back to her baseline.  She is here today for follow up.      Review of Systems:  Review of Systems   Unable to perform ROS: Dementia       Current Medications:    Current Outpatient Medications:     furosemide (LASIX) 20 MG tablet, Take 1 tablet by mouth 3 (Three) Times a Week. Monday, Wednesday, " Friday, Disp: 12 tablet, Rfl: 6    metoprolol tartrate (LOPRESSOR) 25 MG tablet, Take 1 tablet by mouth Daily., Disp: , Rfl:     Nutritional Supplements (ENSURE ACTIVE) liquid, Take  by mouth., Disp: , Rfl:     rOPINIRole (REQUIP) 1 MG tablet, Take 1 tablet by mouth Every Night. Take 1 hour before bedtime., Disp: , Rfl:     sertraline (ZOLOFT) 25 MG tablet, Take 2 tablets by mouth Daily., Disp: , Rfl:     simvastatin (ZOCOR) 20 MG tablet, Take 1 tablet by mouth Every Night., Disp: , Rfl:     sucralfate (CARAFATE) 1 g tablet, Take 1 tablet by mouth 4 (Four) Times a Day., Disp: 40 tablet, Rfl: 0     Allergies   Allergen Reactions    Iodides Rash    Penicillins Rash       Past Medical History:   Diagnosis Date    Atherosclerosis     Chronic blood loss anemia     COPD (chronic obstructive pulmonary disease)     Dementia     Depression     Emphysema of lung     History of transfusion     Hypertension     Osteoporosis     Paroxysmal atrial fibrillation     PVD (peripheral vascular disease)     Stroke         Past Surgical History:   Procedure Laterality Date    APPENDECTOMY      CEREBRAL ANEURYSM REPAIR      CHOLECYSTECTOMY      COLONOSCOPY N/A 5/9/2017    COLONOSCOPY N/A 6/27/2018    Procedure: COLONOSCOPY;  Surgeon: Bhaskar Dickson DO;  Location: [facility] ENDOSCOPY;  Service: Gastroenterology    ENDOSCOPY N/A 5/9/2017    ENDOSCOPY N/A 6/27/2018    Procedure: ESOPHAGOGASTRODUODENOSCOPY;  Surgeon: Bhaskar Dickson DO;  Location: [facility] ENDOSCOPY;  Service: Gastroenterology    HYSTERECTOMY          Social History     Socioeconomic History    Marital status:    Tobacco Use    Smoking status: Every Day     Packs/day: 0.50     Years: 60.00     Pack years: 30.00     Types: Cigarettes    Smokeless tobacco: Never   Substance and Sexual Activity    Alcohol use: No    Drug use: No    Sexual activity: Defer       Family History   Problem Relation Age of Onset    Hypertension Father         Objective     /82   Pulse  106   Resp 16   Wt 76.7 kg (169 lb)   SpO2 (!) 89%   BMI 26.47 kg/m²     Physical Exam:  Constitutional: Awake, alert, in no acute distress, normal gait, sitting up in exam chair  Eyes: EOMS intact, wearing glasses  HENT: Normocephalic, atraumatic  Neck: Supple  Respiratory: Nonlabored respirations, clear to auscultation bilaterally  Cardiovascular: Irregularly regular rhythm, tachycardia, no edema observed  Gastrointestinal: Soft, bowel sounds present in all 4 quadrants  Musculoskeletal: Moves all extremities   Psychiatric: Cooperative, irritable  Neurologic: Oriented to person and place, disoriented to time and situation, speech clear  Skin: Warm, dry, no rashes or wounds appreciated       Data Reviewed:            Invalid input(s): LABALBU, PROT  Imaging Results (Last 72 Hours)       ** No results found for the last 72 hours. **                Assessment/Plan     Code Status/Advance Care Planning:  - Patient is a DNR/DNI.  Code status confirmed with patient's daughter/guardian, Keli Hilario, and documentation verified in chart.  Patient's daughter, Keli Hilario, is her legal guardian.  Guardianship documentation is scanned in patient's chart.    Goals of Care:  - Discussed goals of care with patient's daughter, Keli, as patient is demented.  Keli recognizes that patient's dementia is progressive in nature and will continue to worsen over time.  She reports patient's mood varies day by day and patient has been more irritable lately.  Keli states she still wants patient to have quality of life over quantity of life.  Patient currently lives with Keli who is her primary caregiver.  Patient also has additional caregivers Monday-Thursday through [facility] as Keli works during the week.  Although Keli receives a lot of support from family, she reports she had to cancel an upcoming trip due to fear of leaving patient in her current state.  Revisited the idea of respite care at [facility].  Keli states she will consider respite care in the future when needed.    Symptom Control:  - Will increase Remeron from 7.5mg nightly to 15mg nightly to improve patient's appetite and mood, and decrease sedation.  New prescription sent to patient's pharmacy.  - Explained the importance of controlling blood pressure and cholesterol level in attempt to slow the progression of vascular dementia.  Advised patient to continue metoprolol 25mg daily and simvastatin 20mg daily.   - Advised patient to take all of her medications as prescribed.  - Continue current medical management per patient's PCP.      Code status: DNR  Medical interventions: Limited support  Advanced directives: Patient has a guardian.    Return in about 2 months (around 11/14/2023) for Office Visit.      This document has been electronically signed by BRAYDEN Molina on September 14, 2023 14:45 CDT

## (undated) DEVICE — CANN SMPL SOFTECH BIFLO ETCO2 A/M 7FT

## (undated) DEVICE — BITEBLOCK ENDO W/STRAP 60F A/ LF DISP

## (undated) DEVICE — FIAPC® PROBE W/ FILTER 2200 C OD 2.3MM/6.9FR; L 2.2M/7.2FT: Brand: ERBE

## (undated) DEVICE — PAD GRND REM POLYHESIVE A/ DISP

## (undated) DEVICE — SINGLE-USE BIOPSY FORCEPS: Brand: RADIAL JAW 4